# Patient Record
Sex: FEMALE | Race: WHITE | NOT HISPANIC OR LATINO | Employment: OTHER
[De-identification: names, ages, dates, MRNs, and addresses within clinical notes are randomized per-mention and may not be internally consistent; named-entity substitution may affect disease eponyms.]

---

## 2017-08-19 ENCOUNTER — HEALTH MAINTENANCE LETTER (OUTPATIENT)
Age: 37
End: 2017-08-19

## 2019-11-06 ENCOUNTER — HEALTH MAINTENANCE LETTER (OUTPATIENT)
Age: 39
End: 2019-11-06

## 2020-02-16 ENCOUNTER — HEALTH MAINTENANCE LETTER (OUTPATIENT)
Age: 40
End: 2020-02-16

## 2020-11-29 ENCOUNTER — HEALTH MAINTENANCE LETTER (OUTPATIENT)
Age: 40
End: 2020-11-29

## 2021-04-10 ENCOUNTER — HEALTH MAINTENANCE LETTER (OUTPATIENT)
Age: 41
End: 2021-04-10

## 2021-09-19 ENCOUNTER — HEALTH MAINTENANCE LETTER (OUTPATIENT)
Age: 41
End: 2021-09-19

## 2022-05-01 ENCOUNTER — HEALTH MAINTENANCE LETTER (OUTPATIENT)
Age: 42
End: 2022-05-01

## 2022-06-19 ENCOUNTER — HOSPITAL ENCOUNTER (OUTPATIENT)
Facility: CLINIC | Age: 42
Setting detail: OBSERVATION
Discharge: HOME | End: 2022-06-20
Attending: EMERGENCY MEDICINE | Admitting: INTERNAL MEDICINE
Payer: MEDICARE

## 2022-06-19 ENCOUNTER — APPOINTMENT (OUTPATIENT)
Dept: ULTRASOUND IMAGING | Facility: CLINIC | Age: 42
End: 2022-06-19
Attending: EMERGENCY MEDICINE
Payer: MEDICARE

## 2022-06-19 DIAGNOSIS — K81.9 CHOLECYSTITIS: ICD-10-CM

## 2022-06-19 LAB
ALBUMIN SERPL-MCNC: 3.7 G/DL (ref 3.4–5)
ALP SERPL-CCNC: 66 U/L (ref 40–150)
ALT SERPL W P-5'-P-CCNC: 48 U/L (ref 0–50)
ANION GAP SERPL CALCULATED.3IONS-SCNC: 4 MMOL/L (ref 3–14)
AST SERPL W P-5'-P-CCNC: 69 U/L (ref 0–45)
BASOPHILS # BLD AUTO: 0 10E3/UL (ref 0–0.2)
BASOPHILS NFR BLD AUTO: 0 %
BILIRUB SERPL-MCNC: 0.7 MG/DL (ref 0.2–1.3)
BUN SERPL-MCNC: 8 MG/DL (ref 7–30)
CALCIUM SERPL-MCNC: 9 MG/DL (ref 8.5–10.1)
CHLORIDE BLD-SCNC: 108 MMOL/L (ref 94–109)
CO2 SERPL-SCNC: 27 MMOL/L (ref 20–32)
CREAT SERPL-MCNC: 0.86 MG/DL (ref 0.52–1.04)
EOSINOPHIL # BLD AUTO: 0.1 10E3/UL (ref 0–0.7)
EOSINOPHIL NFR BLD AUTO: 0 %
ERYTHROCYTE [DISTWIDTH] IN BLOOD BY AUTOMATED COUNT: 13.9 % (ref 10–15)
GFR SERPL CREATININE-BSD FRML MDRD: 88 ML/MIN/1.73M2
GLUCOSE BLD-MCNC: 119 MG/DL (ref 70–99)
HCG SERPL QL: NEGATIVE
HCT VFR BLD AUTO: 39.7 % (ref 35–47)
HGB BLD-MCNC: 12.2 G/DL (ref 11.7–15.7)
IMM GRANULOCYTES # BLD: 0 10E3/UL
IMM GRANULOCYTES NFR BLD: 0 %
LIPASE SERPL-CCNC: 116 U/L (ref 73–393)
LYMPHOCYTES # BLD AUTO: 1.3 10E3/UL (ref 0.8–5.3)
LYMPHOCYTES NFR BLD AUTO: 12 %
MCH RBC QN AUTO: 24.7 PG (ref 26.5–33)
MCHC RBC AUTO-ENTMCNC: 30.7 G/DL (ref 31.5–36.5)
MCV RBC AUTO: 81 FL (ref 78–100)
MONOCYTES # BLD AUTO: 0.7 10E3/UL (ref 0–1.3)
MONOCYTES NFR BLD AUTO: 6 %
NEUTROPHILS # BLD AUTO: 9.1 10E3/UL (ref 1.6–8.3)
NEUTROPHILS NFR BLD AUTO: 82 %
NRBC # BLD AUTO: 0 10E3/UL
NRBC BLD AUTO-RTO: 0 /100
PLATELET # BLD AUTO: 280 10E3/UL (ref 150–450)
POTASSIUM BLD-SCNC: 3.6 MMOL/L (ref 3.4–5.3)
PROT SERPL-MCNC: 7 G/DL (ref 6.8–8.8)
RBC # BLD AUTO: 4.93 10E6/UL (ref 3.8–5.2)
SARS-COV-2 RNA RESP QL NAA+PROBE: NEGATIVE
SODIUM SERPL-SCNC: 139 MMOL/L (ref 133–144)
WBC # BLD AUTO: 11.2 10E3/UL (ref 4–11)

## 2022-06-19 PROCEDURE — 85025 COMPLETE CBC W/AUTO DIFF WBC: CPT | Performed by: EMERGENCY MEDICINE

## 2022-06-19 PROCEDURE — 99285 EMERGENCY DEPT VISIT HI MDM: CPT | Mod: 25

## 2022-06-19 PROCEDURE — 96365 THER/PROPH/DIAG IV INF INIT: CPT

## 2022-06-19 PROCEDURE — 82310 ASSAY OF CALCIUM: CPT | Performed by: EMERGENCY MEDICINE

## 2022-06-19 PROCEDURE — 250N000009 HC RX 250: Performed by: EMERGENCY MEDICINE

## 2022-06-19 PROCEDURE — 250N000011 HC RX IP 250 OP 636: Performed by: EMERGENCY MEDICINE

## 2022-06-19 PROCEDURE — 36415 COLL VENOUS BLD VENIPUNCTURE: CPT | Performed by: EMERGENCY MEDICINE

## 2022-06-19 PROCEDURE — U0003 INFECTIOUS AGENT DETECTION BY NUCLEIC ACID (DNA OR RNA); SEVERE ACUTE RESPIRATORY SYNDROME CORONAVIRUS 2 (SARS-COV-2) (CORONAVIRUS DISEASE [COVID-19]), AMPLIFIED PROBE TECHNIQUE, MAKING USE OF HIGH THROUGHPUT TECHNOLOGIES AS DESCRIBED BY CMS-2020-01-R: HCPCS | Performed by: EMERGENCY MEDICINE

## 2022-06-19 PROCEDURE — 258N000003 HC RX IP 258 OP 636: Performed by: EMERGENCY MEDICINE

## 2022-06-19 PROCEDURE — 84703 CHORIONIC GONADOTROPIN ASSAY: CPT | Performed by: EMERGENCY MEDICINE

## 2022-06-19 PROCEDURE — 76705 ECHO EXAM OF ABDOMEN: CPT

## 2022-06-19 PROCEDURE — 96375 TX/PRO/DX INJ NEW DRUG ADDON: CPT

## 2022-06-19 PROCEDURE — 83690 ASSAY OF LIPASE: CPT | Performed by: EMERGENCY MEDICINE

## 2022-06-19 PROCEDURE — 96361 HYDRATE IV INFUSION ADD-ON: CPT

## 2022-06-19 PROCEDURE — 99219 PR INITIAL OBSERVATION CARE,LEVEL II: CPT | Performed by: INTERNAL MEDICINE

## 2022-06-19 PROCEDURE — 250N000013 HC RX MED GY IP 250 OP 250 PS 637: Performed by: EMERGENCY MEDICINE

## 2022-06-19 PROCEDURE — C9803 HOPD COVID-19 SPEC COLLECT: HCPCS

## 2022-06-19 PROCEDURE — G0378 HOSPITAL OBSERVATION PER HR: HCPCS

## 2022-06-19 RX ORDER — KETOROLAC TROMETHAMINE 15 MG/ML
15 INJECTION, SOLUTION INTRAMUSCULAR; INTRAVENOUS ONCE
Status: DISCONTINUED | OUTPATIENT
Start: 2022-06-19 | End: 2022-06-20

## 2022-06-19 RX ORDER — DIPHENHYDRAMINE HCL 25 MG
25 CAPSULE ORAL ONCE
Status: COMPLETED | OUTPATIENT
Start: 2022-06-19 | End: 2022-06-19

## 2022-06-19 RX ORDER — CEFTRIAXONE 2 G/1
2 INJECTION, POWDER, FOR SOLUTION INTRAMUSCULAR; INTRAVENOUS ONCE
Status: COMPLETED | OUTPATIENT
Start: 2022-06-19 | End: 2022-06-19

## 2022-06-19 RX ORDER — LORAZEPAM 2 MG/ML
.5-1 INJECTION INTRAMUSCULAR EVERY 4 HOURS PRN
Status: DISCONTINUED | OUTPATIENT
Start: 2022-06-19 | End: 2022-06-20 | Stop reason: HOSPADM

## 2022-06-19 RX ORDER — ONDANSETRON 2 MG/ML
4 INJECTION INTRAMUSCULAR; INTRAVENOUS EVERY 30 MIN PRN
Status: DISCONTINUED | OUTPATIENT
Start: 2022-06-19 | End: 2022-06-20

## 2022-06-19 RX ORDER — SODIUM CHLORIDE 9 MG/ML
INJECTION, SOLUTION INTRAVENOUS CONTINUOUS
Status: DISCONTINUED | OUTPATIENT
Start: 2022-06-19 | End: 2022-06-20

## 2022-06-19 RX ORDER — PIPERACILLIN SODIUM, TAZOBACTAM SODIUM 3; .375 G/15ML; G/15ML
3.38 INJECTION, POWDER, LYOPHILIZED, FOR SOLUTION INTRAVENOUS ONCE
Status: DISCONTINUED | OUTPATIENT
Start: 2022-06-19 | End: 2022-06-19

## 2022-06-19 RX ADMIN — SODIUM CHLORIDE 1000 ML: 9 INJECTION, SOLUTION INTRAVENOUS at 20:11

## 2022-06-19 RX ADMIN — CEFTRIAXONE SODIUM 2 G: 2 INJECTION, POWDER, FOR SOLUTION INTRAMUSCULAR; INTRAVENOUS at 22:39

## 2022-06-19 RX ADMIN — LIDOCAINE HYDROCHLORIDE 30 ML: 20 SOLUTION ORAL; TOPICAL at 20:11

## 2022-06-19 RX ADMIN — ONDANSETRON 4 MG: 2 INJECTION INTRAMUSCULAR; INTRAVENOUS at 20:14

## 2022-06-19 RX ADMIN — DIPHENHYDRAMINE HYDROCHLORIDE 25 MG: 25 CAPSULE ORAL at 21:36

## 2022-06-19 RX ADMIN — SODIUM CHLORIDE: 9 INJECTION, SOLUTION INTRAVENOUS at 22:38

## 2022-06-19 ASSESSMENT — ENCOUNTER SYMPTOMS
VOMITING: 0
CONSTIPATION: 0
NAUSEA: 1
ABDOMINAL PAIN: 1
DIARRHEA: 0
BLOOD IN STOOL: 0

## 2022-06-20 ENCOUNTER — HOSPITAL ENCOUNTER (EMERGENCY)
Facility: CLINIC | Age: 42
Discharge: HOME | End: 2022-06-20
Payer: MEDICARE

## 2022-06-20 VITALS
BODY MASS INDEX: 29.79 KG/M2 | HEART RATE: 104 BPM | WEIGHT: 174.5 LBS | HEIGHT: 64 IN | DIASTOLIC BLOOD PRESSURE: 111 MMHG | TEMPERATURE: 98.1 F | SYSTOLIC BLOOD PRESSURE: 166 MMHG | OXYGEN SATURATION: 95 % | RESPIRATION RATE: 18 BRPM

## 2022-06-20 VITALS
HEIGHT: 64 IN | WEIGHT: 174.8 LBS | RESPIRATION RATE: 18 BRPM | TEMPERATURE: 97.5 F | OXYGEN SATURATION: 99 % | HEART RATE: 83 BPM | DIASTOLIC BLOOD PRESSURE: 114 MMHG | SYSTOLIC BLOOD PRESSURE: 164 MMHG | BODY MASS INDEX: 29.84 KG/M2

## 2022-06-20 LAB
HOLD SPECIMEN: NORMAL

## 2022-06-20 PROCEDURE — G0378 HOSPITAL OBSERVATION PER HR: HCPCS

## 2022-06-20 PROCEDURE — 250N000013 HC RX MED GY IP 250 OP 250 PS 637: Performed by: INTERNAL MEDICINE

## 2022-06-20 PROCEDURE — 96361 HYDRATE IV INFUSION ADD-ON: CPT

## 2022-06-20 RX ORDER — PROCHLORPERAZINE 25 MG
25 SUPPOSITORY, RECTAL RECTAL EVERY 12 HOURS PRN
Status: DISCONTINUED | OUTPATIENT
Start: 2022-06-20 | End: 2022-06-20 | Stop reason: HOSPADM

## 2022-06-20 RX ORDER — PROCHLORPERAZINE MALEATE 10 MG
10 TABLET ORAL EVERY 6 HOURS PRN
Status: DISCONTINUED | OUTPATIENT
Start: 2022-06-20 | End: 2022-06-20 | Stop reason: HOSPADM

## 2022-06-20 RX ORDER — NALOXONE HYDROCHLORIDE 0.4 MG/ML
0.4 INJECTION, SOLUTION INTRAMUSCULAR; INTRAVENOUS; SUBCUTANEOUS
Status: DISCONTINUED | OUTPATIENT
Start: 2022-06-20 | End: 2022-06-20 | Stop reason: HOSPADM

## 2022-06-20 RX ORDER — NALOXONE HYDROCHLORIDE 0.4 MG/ML
0.2 INJECTION, SOLUTION INTRAMUSCULAR; INTRAVENOUS; SUBCUTANEOUS
Status: DISCONTINUED | OUTPATIENT
Start: 2022-06-20 | End: 2022-06-20 | Stop reason: HOSPADM

## 2022-06-20 RX ORDER — AMOXICILLIN 250 MG
2 CAPSULE ORAL 2 TIMES DAILY PRN
Status: DISCONTINUED | OUTPATIENT
Start: 2022-06-20 | End: 2022-06-20 | Stop reason: HOSPADM

## 2022-06-20 RX ORDER — HYDROMORPHONE HCL IN WATER/PF 6 MG/30 ML
0.2 PATIENT CONTROLLED ANALGESIA SYRINGE INTRAVENOUS
Status: DISCONTINUED | OUTPATIENT
Start: 2022-06-20 | End: 2022-06-20 | Stop reason: HOSPADM

## 2022-06-20 RX ORDER — AMOXICILLIN 250 MG
1 CAPSULE ORAL 2 TIMES DAILY PRN
Status: DISCONTINUED | OUTPATIENT
Start: 2022-06-20 | End: 2022-06-20 | Stop reason: HOSPADM

## 2022-06-20 RX ORDER — ONDANSETRON 2 MG/ML
4 INJECTION INTRAMUSCULAR; INTRAVENOUS EVERY 6 HOURS PRN
Status: DISCONTINUED | OUTPATIENT
Start: 2022-06-20 | End: 2022-06-20 | Stop reason: HOSPADM

## 2022-06-20 RX ORDER — ACETAMINOPHEN 650 MG/1
650 SUPPOSITORY RECTAL EVERY 6 HOURS PRN
Status: DISCONTINUED | OUTPATIENT
Start: 2022-06-20 | End: 2022-06-20 | Stop reason: HOSPADM

## 2022-06-20 RX ORDER — NICOTINE 21 MG/24HR
1 PATCH, TRANSDERMAL 24 HOURS TRANSDERMAL AT BEDTIME
Status: DISCONTINUED | OUTPATIENT
Start: 2022-06-20 | End: 2022-06-20 | Stop reason: HOSPADM

## 2022-06-20 RX ORDER — CEFTRIAXONE 2 G/1
2 INJECTION, POWDER, FOR SOLUTION INTRAMUSCULAR; INTRAVENOUS EVERY 24 HOURS
Status: DISCONTINUED | OUTPATIENT
Start: 2022-06-20 | End: 2022-06-20 | Stop reason: HOSPADM

## 2022-06-20 RX ORDER — ONDANSETRON 4 MG/1
4 TABLET, ORALLY DISINTEGRATING ORAL EVERY 6 HOURS PRN
Status: DISCONTINUED | OUTPATIENT
Start: 2022-06-20 | End: 2022-06-20 | Stop reason: HOSPADM

## 2022-06-20 RX ORDER — OXYCODONE HYDROCHLORIDE 5 MG/1
5 TABLET ORAL EVERY 4 HOURS PRN
Status: DISCONTINUED | OUTPATIENT
Start: 2022-06-20 | End: 2022-06-20 | Stop reason: HOSPADM

## 2022-06-20 RX ORDER — ACETAMINOPHEN 325 MG/1
650 TABLET ORAL EVERY 6 HOURS PRN
Status: DISCONTINUED | OUTPATIENT
Start: 2022-06-20 | End: 2022-06-20 | Stop reason: HOSPADM

## 2022-06-20 RX ORDER — LIDOCAINE 40 MG/G
CREAM TOPICAL
Status: DISCONTINUED | OUTPATIENT
Start: 2022-06-20 | End: 2022-06-20 | Stop reason: HOSPADM

## 2022-06-20 RX ADMIN — NICOTINE 1 PATCH: 14 PATCH, EXTENDED RELEASE TRANSDERMAL at 00:28

## 2022-06-20 NOTE — ED TRIAGE NOTES
Pt discharged from [facility] this am. Was told she would have to have gall bladder surgery. Having upper abdominal pain 7/10 with nausea. Received zofran PO from EMS with some relief.      Triage Assessment     Row Name 06/20/22 1415       Triage Assessment (Adult)    Airway WDL WDL       Respiratory WDL    Respiratory WDL WDL       Skin Circulation/Temperature WDL    Skin Circulation/Temperature WDL WDL       Cardiac WDL    Cardiac WDL WDL       Peripheral/Neurovascular WDL    Peripheral Neurovascular WDL WDL       Cognitive/Neuro/Behavioral WDL    Cognitive/Neuro/Behavioral WDL WDL

## 2022-06-20 NOTE — PHARMACY-ADMISSION MEDICATION HISTORY
Pharmacy Medication History  Admission medication history interview status for the 6/19/2022  admission is complete. See EPIC admission navigator for prior to admission medications     Location of Interview: Patient room  Medication history sources: Patient, Surescripts and Care Everywhere    Significant changes made to the medication list:  None     In the past week, patient estimated taking medication this percent of the time: greater than 90%    Additional medication history information:   None     Medication reconciliation completed by provider prior to medication history? No    Time spent in this activity: 10 minutes    Prior to Admission medications    Medication Sig Last Dose Taking? Auth Provider Long Term End Date   clonazePAM (KLONOPIN) 0.5 MG tablet Take 0.5-1 mg by mouth daily as needed for anxiety 6/18/2022 at 2 tabs at bedtime Yes Reported, Patient Yes        The information provided in this note is only as accurate as the sources available at the time of update(s)

## 2022-06-20 NOTE — PROGRESS NOTES
Progress note:     Pt decided to leave AMA. She declined offers to talk to the provider regarding her treatment plan. PIV removed. Pt was given all her belongings.

## 2022-06-20 NOTE — PLAN OF CARE
RECEIVING UNIT ED HANDOFF REVIEW    ED Nurse Handoff Report was reviewed by: Lino Maldonado RN on June 19, 2022 at 11:22 PM

## 2022-06-20 NOTE — ED PROVIDER NOTES
History   Chief Complaint:  Abdominal pain     HPI   Bria Breen is a 41 year old female with history of bulimia nervosa who presents with abdominal pain. The patient states that this morning she suddenly she developed upper abdominal pain. She states that besides some nausea she does not have vomiting, black or bloody stool, diarrhea. She denies any stomach ulcers or irritation. She denies alcohol abuse or excessive ibuprofen use. She denies any surgery on her abdomen.    Review of Systems   Gastrointestinal: Positive for abdominal pain and nausea. Negative for blood in stool, constipation, diarrhea and vomiting.   All other systems reviewed and are negative.    Allergies:  Bee  Benadryl Allergy  Vistaril    Medications:  ARIPiprazole  ClonazePAM   QUEtiapine Fumarate  SUMAtriptan   venlafaxine     Past Medical History:     Hyperlipidemia  nicotine dependence  Schizoaffective disorder  OCD  Varicella  Migraines  Bulimia nervosa  Depression     Past Surgical History:    Septoplasty  Removal of blood clot  Nose surgery    Family History:    Father-heart attack, high blood pressure,high cholesterol, HTN  Mother-Depression, HTN, high cholesterol    Social History:  The patient presents to the ED via EMS.    Physical Exam     Patient Vitals for the past 24 hrs:   BP Temp Temp src Pulse Resp   06/19/22 1947 (!) 151/97 99.1  F (37.3  C) Oral 72 20       Physical Exam  VS: Reviewed per above  HENT: normal speech  EYES: sclera anicteric  CV: Rate as noted, regular rhythm.   RESP: Effort normal. Breath sounds are normal bilaterally.  GI: moderate upper abdominal tenderness without rebound/guarding, not distended.  NEURO: Alert, moving all extremities  MSK: No deformity of the extremities  SKIN: Warm and dry    Emergency Department Course     Imaging:  Abdomen US, limited (RUQ only)   Final Result   IMPRESSION:   1.  Findings suspicious for acute cholecystitis.   2.  Hepatomegaly with possible steatosis.               Report per radiology    Laboratory:  Labs Ordered and Resulted from Time of ED Arrival to Time of ED Departure   COMPREHENSIVE METABOLIC PANEL - Abnormal       Result Value    Sodium 139      Potassium 3.6      Chloride 108      Carbon Dioxide (CO2) 27      Anion Gap 4      Urea Nitrogen 8      Creatinine 0.86      Calcium 9.0      Glucose 119 (*)     Alkaline Phosphatase 66      AST 69 (*)     ALT 48      Protein Total 7.0      Albumin 3.7      Bilirubin Total 0.7      GFR Estimate 88     CBC WITH PLATELETS AND DIFFERENTIAL - Abnormal    WBC Count 11.2 (*)     RBC Count 4.93      Hemoglobin 12.2      Hematocrit 39.7      MCV 81      MCH 24.7 (*)     MCHC 30.7 (*)     RDW 13.9      Platelet Count 280      % Neutrophils 82      % Lymphocytes 12      % Monocytes 6      % Eosinophils 0      % Basophils 0      % Immature Granulocytes 0      NRBCs per 100 WBC 0      Absolute Neutrophils 9.1 (*)     Absolute Lymphocytes 1.3      Absolute Monocytes 0.7      Absolute Eosinophils 0.1      Absolute Basophils 0.0      Absolute Immature Granulocytes 0.0      Absolute NRBCs 0.0     LIPASE - Normal    Lipase 116     HCG QUALITATIVE PREGNANCY - Normal    hCG Serum Qualitative Negative     COVID-19 VIRUS (CORONAVIRUS) BY PCR      Emergency Department Course:         Reviewed:  I reviewed nursing notes, vitals, past medical history and Care Everywhere    Assessments:  1944 I obtained history and examined the patient as noted above.   2205 I rechecked the patient and explained findings.     Consults:  2205 Talked to Dr Muir about patients symptoms and admittance.    Interventions:  2011 Bolus 1000mL IV  2011 Xylocaine 30mL PO  2014 Zofran 4mg IV  2136 Toradol 15mg IV  2136 Benadryl 25mg    Disposition:  The patient was admitted to the hospital under the care of Dr. Muir.     Impression & Plan     Medical Decision Making:  Patient presents to the ER for evaluation of acute onset upper abdominal pain.  On arrival vital signs are  reassuring.  She does have moderate upper abdominal tenderness without peritoneal signs.  Ultrasound does show signs of cholecystitis.  Labs otherwise reassuring without signs of obstructive LFT pattern.  IV antibiotics provided.  She was admitted for further surgical consultation.    Diagnosis:    ICD-10-CM    1. Cholecystitis  K81.9        Discharge Medications:  New Prescriptions    No medications on file       Scribe Disclosure:  Gary RIBERA, am serving as a scribe at 7:44 PM on 6/19/2022 to document services personally performed by Rupesh Mishra MD based on my observations and the provider's statements to me.              Rupesh Mishra MD  06/19/22 0962

## 2022-06-20 NOTE — ED NOTES
Bed: ED24  Expected date:   Expected time:   Means of arrival:   Comments:  Shalini 1 41F abdominal pain eta 7

## 2022-06-20 NOTE — PROVIDER NOTIFICATION
MD Notification    Notified Person: MD    Notified Person Name:  Tinaerickson     Notification Date/Time: 06/20/22 @ 05:22    Notification Interaction: pager     Purpose of Notification: pt is hypertensive, BP: 166/111 and pulse: 104. Pt is asymptomatic and resting in bed.     Orders Received:    Comments:

## 2022-06-20 NOTE — PLAN OF CARE
Observation goals  PRIOR TO DISCHARGE        Comments:     -diagnostic tests and consults completed and resulted: not met      -vital signs normal or at patient baseline: not met, slightly hypertensive     -infection is improving: partially met       Nurse to notify provider when observation goals have been met and patient is ready for discharge.
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
## 2022-06-20 NOTE — ED NOTES
[facility]  ED Nurse Handoff Report    ED Chief complaint: Abdominal Pain      ED Diagnosis:   Final diagnoses:   Cholecystitis       Code Status: Full Code    Allergies:   Allergies   Allergen Reactions     Bee      Vistaril      Panic attack       Patient Story: Pt had sudden onset sharp diffuse abd pain this AM. C/o NV, LMP 3w wks ago. Requesting the morning after pill.             Triage Assessment            Row Name 06/19/22 1950             Triage Assessment (Adult)      Airway WDL WDL             Respiratory WDL      Respiratory WDL WDL             Skin Circulation/Temperature WDL      Skin Circulation/Temperature WDL WDL             Cardiac WDL      Cardiac WDL WDL             Peripheral/Neurovascular WDL      Peripheral Neurovascular WDL WDL             Cognitive/Neuro/Behavioral WDL      Cognitive/Neuro/Behavioral WDL WDL           Focused Assessment:  Pt c/o abd pain, nausea,improved after meds,     Treatments and/or interventions provided: see chart  Patient's response to treatments and/or interventions: tolerated well    To be done/followed up on inpatient unit:  see chart    Does this patient have any cognitive concerns?: na    Activity level - Baseline/Home:  Independent  Activity Level - Current:   Independent    Patient's Preferred language: English   Needed?: No    Isolation: None  Infection: Not Applicable  Patient tested for COVID 19 prior to admission: YES  Bariatric?: No    Vital Signs:   Vitals:    06/19/22 1947   BP: (!) 151/97   Pulse: 72   Resp: 20   Temp: 99.1  F (37.3  C)   TempSrc: Oral       Cardiac Rhythm:     Was the PSS-3 completed:   Yes  What interventions are required if any?  See chart             Family Comments: na  OBS brochure/video discussed/provided to patient/family: Yes              Name of person given brochure if not patient: na              Relationship to patient: na    For the majority of the shift this patient's behavior was Green.    Behavioral interventions performed were na.    ED NURSE PHONE NUMBER: [phone]

## 2022-06-20 NOTE — ED TRIAGE NOTES
Pt had sudden onset sharp diffuse abd pain this AM. C/o NV, LMP 3w wks ago. Requesting the morning after pill.     Triage Assessment     Row Name 06/19/22 1950       Triage Assessment (Adult)    Airway WDL WDL       Respiratory WDL    Respiratory WDL WDL       Skin Circulation/Temperature WDL    Skin Circulation/Temperature WDL WDL       Cardiac WDL    Cardiac WDL WDL       Peripheral/Neurovascular WDL    Peripheral Neurovascular WDL WDL       Cognitive/Neuro/Behavioral WDL    Cognitive/Neuro/Behavioral WDL WDL

## 2022-06-20 NOTE — PLAN OF CARE
Orientation/Cognitive: A/O x4   Observation Goals (Met/ Not Met): not met   Mobility Level/Assist Equipment: SBA   Fall Risk (Y/N): no   Behavior Concerns: flat affect, refusing cares   Pain Management: declines interventions, prns available   Tele/VS/O2: VSS on RA, ex HTN   ABNL Lab/BG: WBC: 11.2   Diet: NPO   Bowel/Bladder: Continent   Skin Concerns: no   Drains/Devices: PIV SL   Tests/Procedures for next shift: Gen surg consult   Anticipated DC date & active delays: pending   Patient Stated Goal for Today: Go home       Observation goals  PRIOR TO DISCHARGE        Comments:      -diagnostic tests and consults completed and resulted: not met       -vital signs normal or at patient baseline: not met, slightly hypertensive      -infection is improving: partially met        Nurse to notify provider when observation goals have been met and patient is ready for discharge.

## 2022-06-28 NOTE — DISCHARGE SUMMARY
[facility]  Hospitalist Discharge Summary      Date of Admission:  6/19/2022  Date of Discharge:  6/20/2022  5:43 AM  Discharging Provider: Hiren Muir MD  Discharge Service: Hospitalist Service    Discharge Diagnoses   Symptomatic cholelithiasis with early acute cholecystitis  Generalized anxiety disorder with panic  Schizoaffective disorder  Major depression with history of suicidal gesture  OCD  History of Bulmia Nervosa  Migraine headaches  Tobacco use disorder    Follow-ups Needed After Discharge   Surgery follow up for cholecystectomy; left AMA from hospital prior to being seen by surgical team      Discharge Disposition   Left against medical advice prior to being seen by provider/surgical team date of discharge     Hospital Course   Admitted with nausea, vomiting, right upper quadrant and epigastric pain.  Initially treated with 2 g ceftriaxone with a general surgery consult placed after gallbladder wall thickening was noted on right upper quadrant ultrasound.  Discussed plan for surgical intervention with patient on admission in the setting of cholecystitis, and at that time, she was agreeable.    Early this morning, however, patient opted to leave AGAINST MEDICAL ADVICE prior to being seen by provider or surgical team.    Consultations This Hospital Stay   SURGERY GENERAL IP CONSULT    Code Status   Prior    Time Spent on this Encounter   I, Hiren Muir MD, discharged this patient today but I did not personally see the patient today and will not be billing for the patient's discharge.       Hiren Muir MD  [facility] EXTENDED RECOVERY AND SHORT STAY  [address] [place] [id number]  Phone: [phone]  ______________________________________________________________________    Physical Exam   Physical exam was not performed date of discharge as patient left AGAINST MEDICAL ADVICE prior to being seen       Primary Care Physician    Radha Zeng    Discharge Orders   No discharge procedures on file.    Significant Results and Procedures   Results for orders placed or performed during the hospital encounter of 06/19/22   Abdomen US, limited (RUQ only)    Narrative    EXAM: US ABDOMEN LIMITED  LOCATION: [facility] [place] [facility]  DATE/TIME: 6/19/2022 8:42 PM    INDICATION: upper abd pain x1 day  COMPARISON: None.  TECHNIQUE: Limited abdominal ultrasound.    FINDINGS:    GALLBLADDER: Cholelithiasis, several of which are nonmobile in the gallbladder neck. There is gallbladder wall edema and positive sonographic Sullivan's sign. No definite pericholecystic fluid.    BILE DUCTS: No biliary dilatation. The common duct measures 4 mm.    LIVER: Hepatomegaly with possible steatosis. No focal mass.    RIGHT KIDNEY: No hydronephrosis.    PANCREAS: The visualized portions are normal.    No ascites.      Impression    IMPRESSION:  1.  Findings suspicious for acute cholecystitis.  2.  Hepatomegaly with possible steatosis.             Discharge Medications   Discharge Medication List as of 6/20/2022  5:43 AM      CONTINUE these medications which have NOT CHANGED    Details   clonazePAM (KLONOPIN) 0.5 MG tablet Take 0.5-1 mg by mouth daily as needed for anxiety, Historical           Allergies   Allergies   Allergen Reactions     Bee      Vistaril      Panic attack

## 2022-07-30 ENCOUNTER — HOSPITAL ENCOUNTER (EMERGENCY)
Facility: CLINIC | Age: 42
Discharge: HOME | End: 2022-07-30
Attending: EMERGENCY MEDICINE | Admitting: EMERGENCY MEDICINE
Payer: MEDICARE

## 2022-07-30 VITALS
TEMPERATURE: 98 F | OXYGEN SATURATION: 97 % | HEART RATE: 99 BPM | SYSTOLIC BLOOD PRESSURE: 155 MMHG | RESPIRATION RATE: 18 BRPM | DIASTOLIC BLOOD PRESSURE: 95 MMHG

## 2022-07-30 DIAGNOSIS — R46.89 INAPPROPRIATE SOCIAL BEHAVIOR: ICD-10-CM

## 2022-07-30 PROCEDURE — 99283 EMERGENCY DEPT VISIT LOW MDM: CPT

## 2022-07-30 ASSESSMENT — ENCOUNTER SYMPTOMS
HEADACHES: 1
ABDOMINAL PAIN: 0

## 2022-07-30 NOTE — ED TRIAGE NOTES
Pt picked up by PD and placed on emergency hold, pt found walking on the street with no shirt, pants or shoes. States she is being evicted and has no place to go. Pt is A&O x4, VSS, denies pain but appears unable to take care of self at this time.     Triage Assessment     Row Name 07/30/22 1211       Triage Assessment (Adult)    Airway WDL WDL       Respiratory WDL    Respiratory WDL WDL       Skin Circulation/Temperature WDL    Skin Circulation/Temperature WDL WDL       Cardiac WDL    Cardiac WDL WDL       Peripheral/Neurovascular WDL    Peripheral Neurovascular WDL WDL       Cognitive/Neuro/Behavioral WDL    Cognitive/Neuro/Behavioral WDL WDL

## 2022-07-30 NOTE — ED PROVIDER NOTES
History   Chief Complaint:  Altered Mental Status       The history is provided by the patient and the EMS personnel.      Bria Breen is a 42 year old female with history of schizoaffective disorder, anxiety and depression who presents with altered mental status. The patient reports that she got up this morning and went to the gas station to get cigarettes. EMS reports that the police had found the patient wearing only a bra and underwear, with a cardigan over top. The patient endorses a mild headache. She denies abdominal pain, drug use and alcohol. The patient says the only reason she was inappropriately dressed because it was hot outside, and did not find her outfit to be unreasonable. Patient says she does not know what her living situation will be starting tomorrow.    Review of Systems   Gastrointestinal: Negative for abdominal pain.   Neurological: Positive for headaches.   Psychiatric/Behavioral: Positive for behavioral problems.   All other systems reviewed and are negative.    Allergies:  Bee  Vistaril    Medications:  Prozac  Vistaril  Desyrel  Abilify  Klonopin  Seroquel  Ativan  Deltasone    Past Medical History:     Schizoaffective disorder  Cholecystitis  Anxiety  Bulimia nervosa  Depression  Plantar wart  Migraine  Varicella  OCD  Nicotine dependence  Hyperlipidemia  UTI  PTSD  Gallstones    Past Surgical History:    Blood clot removal  Septoplasty    Family History:    Mother: Hypertension, migraines, gallbladder disease, hyperlipidemia  Father: Hypertension, hyperlipidemia, CAD    Social History:  Patient arrived to ED via EMS.    Physical Exam     Patient Vitals for the past 24 hrs:   BP Temp Temp src Pulse Resp SpO2   07/30/22 1208 -- 98  F (36.7  C) Temporal -- 18 --   07/30/22 1207 -- -- -- -- -- 97 %   07/30/22 1206 (!) 155/95 -- -- 99 -- --       Physical Exam  Eye:  Pupils are equal, round, and reactive.  Extraocular movements intact.    ENT:  No rhinorrhea.  Moist mucus membranes.   "Normal tongue and tonsil.    Cardiac:  Regular rate and rhythm.  No murmurs, gallops, or rubs.    Pulmonary:  Clear to auscultation bilaterally.  No wheezes, rales, or rhonchi.    Abdomen:  Positive bowel sounds.  Abdomen is soft and non-distended, without focal tenderness.    Musculoskeletal:  Normal movement of all extremities without evidence for deficit.    Skin:  Warm and dry without rashes.    Neurologic:  Non-focal exam without asymmetric weakness or numbness.     Psychiatric:  Normal affect with appropriate interaction with examiner. Reasonable eye contact. Able to recite today's events. She seems socially inappropriate regarding her outfit today, but does not appear to be altered.    Emergency Department Course     Emergency Department Course:    Reviewed:  I reviewed nursing notes, vitals, past medical history and Care Everywhere    Assessments:  1129 I obtained history and examined the patient as noted above.     Disposition:  The patient was discharged to home.     Impression & Plan     Medical Decision Making:  This 42-year-old woman with a history of schizoaffective disorder presents to us with inappropriate social behavior today.  Police were called when she was found walking down the street with a very short dress on her bodies.  However, it had been unbuttoned in the front and she was exposing her underwear.  She was also not wearing a bra.  Bystanders were concerned that she appeared to be disheveled and called for assistance.  Police brought her here because they also felt that she was exhibiting inappropriate behavior and thought that she may be under the influence of drugs or showing inability to care for herself.    On my conversation with the patient, she is generally clear.  She is able to tell me what happened this morning.  She tells me that she woke up and wanted to get a \"rockstar energy drink and some cigarettes.\"  She grabbed the first thing that was next to her bed, this stress that " "she had worn last night.  She was on her way to get these items when she was stopped by police.  She states that she was not exactly clear whether or not the dress was open, though she noted that it was warm outside and his dress was a fairly heavy material and she was feeling warm.  She seems very nonchalant about this outfit and encounter, stating \"most people are wearing far through her clothes then I am right now, I do not know what the big deal is.\"    The patient's head to toe exam is otherwise unremarkable.  She is alert and oriented x4.  She has no complaints of chest, abdominal, or infectious symptoms.  She is not suicidal or homicidal.  She desires to be discharged.  I see no indication to place her on a hold at this time.  I see no indication for blood work or urinalysis.  She denies drug use, and while there may be drugs in her system, I do not believe that it is germane to the current situation and she appears to be clinically sober.  She was given bus tokens and will be discharged at her request.  She was invited back to our facility at any point if he changes her mind about being reassessed or if there are other emergent concerns.      Diagnosis:    ICD-10-CM    1. Inappropriate social behavior  R46.89        Discharge Medications:  New Prescriptions    No medications on file       Scribe Disclosure:  I, Sybil Fredonia, am serving as a scribe at 12:04 PM on 7/30/2022 to document services personally performed by Trierweiler, Chad A, MD based on my observations and the provider's statements to me.            Trierweiler, Chad A, MD  07/31/22 1019    "

## 2022-07-30 NOTE — DISCHARGE INSTRUCTIONS
Discharge Instructions  Mental Health Concerns    You were seen today for mental health concerns, such as depression, anxiety, or suicidal thinking. Your provider feels that you do not require hospitalization at this time. However, your symptoms may become worse, and you may need to return to the Emergency Department. Most treatments of depression and suicidal thoughts are a process rather than a single intervention.  Medications and counseling can take several weeks or more to help.    Generally, every Emergency Department visit should have a follow-up clinic visit with either a primary or a specialty clinic/provider. Please follow-up as instructed by your emergency provider today.    By accepting these discharge instructions:  You promise to not harm yourself or others.  You agree that if you feel you are becoming unable to keep that promise, you will do something to help yourself before you do anything to harm yourself or others.   You agree to keep any safety plan arranged on your visit here today.  You agree to take any medication prescribed or recommended by your provider.  If you are getting worse, you can contact a friend or a family member, contact your counselor or family provider, contact a crisis line, or other options discussed with the provider or therapist today.  At any time, you can call 911 and return to the Emergency Department for more help.  You understand that follow-up is essential to your treatment, and you will make and keep appointments recommended on your visit today.    How to improve your mental health and prevent suicide:  Involve others by letting family, friends, counselors know.  Do not isolate yourself.  Avoid alcohol or drugs. Remove weapons, poisons from your home.  Try to stick to routines for eating, sleeping and getting regular exercise.    Try to get into sunlight. Bright natural light not only treats seasonal affective disorder but also depression.  Increase safe activities  that you enjoy.    If you feel worse, contact 1-800-suicide (1-130.860.5015), or call 911, or your primary provider/counselor for additional assistance.    If you were given a prescription for medicine here today, be sure to read all of the information (including the package insert) that comes with your prescription.  This will include important information about the medicine, its side effects, and any warnings that you need to know about.  The pharmacist who fills the prescription can provide more information and answer questions you may have about the medicine.  If you have questions or concerns that the pharmacist cannot address, please call or return to the Emergency Department.   Remember that you can always come back to the Emergency Department if you are not able to see your regular provider in the amount of time listed above, if you get any new symptoms, or if there is anything that worries you.

## 2022-07-30 NOTE — ED NOTES
Bed: ED19  Expected date:   Expected time:   Means of arrival:   Comments:  [facility] 43 F mental health ETA 1154

## 2022-08-12 ENCOUNTER — HOSPITAL ENCOUNTER (EMERGENCY)
Facility: CLINIC | Age: 42
Discharge: LEFT BEFORE DISCHARGE | End: 2022-08-12
Payer: MEDICARE

## 2022-08-13 NOTE — ED TRIAGE NOTES
Report from ambulance:  Pt BIBA with complaints of R ear pain. Pt reports pain started 2 hours ago. Recently homeless. Pt reports stabbing pain. Worse in the R ear than the L. Denies any injuries.
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
## 2022-11-21 ENCOUNTER — HEALTH MAINTENANCE LETTER (OUTPATIENT)
Age: 42
End: 2022-11-21

## 2023-01-24 ENCOUNTER — HOSPITAL ENCOUNTER (EMERGENCY)
Facility: CLINIC | Age: 43
Discharge: HOME | End: 2023-01-25
Attending: PSYCHIATRY & NEUROLOGY | Admitting: PSYCHIATRY & NEUROLOGY
Payer: MEDICARE

## 2023-01-24 DIAGNOSIS — Z59.00 HOMELESS: ICD-10-CM

## 2023-01-24 DIAGNOSIS — F29 PSYCHOSIS, UNSPECIFIED PSYCHOSIS TYPE (H): ICD-10-CM

## 2023-01-24 DIAGNOSIS — Z86.59 HISTORY OF SCHIZOAFFECTIVE DISORDER: ICD-10-CM

## 2023-01-24 LAB
AMPHETAMINES UR QL SCN: ABNORMAL
BARBITURATES UR QL: ABNORMAL
BENZODIAZ UR QL: ABNORMAL
CANNABINOIDS UR QL SCN: ABNORMAL
COCAINE UR QL: ABNORMAL
OPIATES UR QL SCN: ABNORMAL

## 2023-01-24 PROCEDURE — 250N000013 HC RX MED GY IP 250 OP 250 PS 637: Performed by: PSYCHIATRY & NEUROLOGY

## 2023-01-24 PROCEDURE — 90791 PSYCH DIAGNOSTIC EVALUATION: CPT

## 2023-01-24 PROCEDURE — 99284 EMERGENCY DEPT VISIT MOD MDM: CPT | Performed by: PSYCHIATRY & NEUROLOGY

## 2023-01-24 PROCEDURE — 80307 DRUG TEST PRSMV CHEM ANLYZR: CPT | Performed by: PSYCHIATRY & NEUROLOGY

## 2023-01-24 PROCEDURE — 99285 EMERGENCY DEPT VISIT HI MDM: CPT | Mod: 25 | Performed by: PSYCHIATRY & NEUROLOGY

## 2023-01-24 RX ORDER — DIPHENHYDRAMINE HCL 50 MG
50 CAPSULE ORAL 2 TIMES DAILY PRN
COMMUNITY
Start: 2022-11-07 | End: 2023-09-22

## 2023-01-24 RX ORDER — OLANZAPINE 20 MG/1
10 TABLET ORAL 2 TIMES DAILY
COMMUNITY
Start: 2022-11-07 | End: 2023-07-06

## 2023-01-24 RX ORDER — OLANZAPINE 10 MG/1
10 TABLET, ORALLY DISINTEGRATING ORAL 2 TIMES DAILY
Status: DISCONTINUED | OUTPATIENT
Start: 2023-01-24 | End: 2023-01-25 | Stop reason: HOSPADM

## 2023-01-24 RX ORDER — NICOTINE 21 MG/24HR
1 PATCH, TRANSDERMAL 24 HOURS TRANSDERMAL DAILY
Status: DISCONTINUED | OUTPATIENT
Start: 2023-01-24 | End: 2023-01-25 | Stop reason: HOSPADM

## 2023-01-24 RX ORDER — DEXTROAMPHETAMINE SACCHARATE, AMPHETAMINE ASPARTATE, DEXTROAMPHETAMINE SULFATE AND AMPHETAMINE SULFATE 2.5; 2.5; 2.5; 2.5 MG/1; MG/1; MG/1; MG/1
2 TABLET ORAL DAILY
COMMUNITY
Start: 2023-01-23 | End: 2023-09-14

## 2023-01-24 RX ADMIN — NICOTINE 1 PATCH: 21 PATCH, EXTENDED RELEASE TRANSDERMAL at 19:36

## 2023-01-24 RX ADMIN — OLANZAPINE 10 MG: 10 TABLET, ORALLY DISINTEGRATING ORAL at 19:36

## 2023-01-24 SDOH — ECONOMIC STABILITY - HOUSING INSECURITY: HOMELESSNESS UNSPECIFIED: Z59.00

## 2023-01-24 ASSESSMENT — COLUMBIA-SUICIDE SEVERITY RATING SCALE - C-SSRS
6. HAVE YOU EVER DONE ANYTHING, STARTED TO DO ANYTHING, OR PREPARED TO DO ANYTHING TO END YOUR LIFE?: NO
1. IN THE PAST MONTH, HAVE YOU WISHED YOU WERE DEAD OR WISHED YOU COULD GO TO SLEEP AND NOT WAKE UP?: NO
4. HAVE YOU HAD THESE THOUGHTS AND HAD SOME INTENTION OF ACTING ON THEM?: YES
3. HAVE YOU BEEN THINKING ABOUT HOW YOU MIGHT KILL YOURSELF?: YES
REASONS FOR IDEATION PAST MONTH: DOES NOT APPLY
5. HAVE YOU STARTED TO WORK OUT OR WORKED OUT THE DETAILS OF HOW TO KILL YOURSELF? DO YOU INTEND TO CARRY OUT THIS PLAN?: NO
4. HAVE YOU HAD THESE THOUGHTS AND HAD SOME INTENTION OF ACTING ON THEM?: NO
2. HAVE YOU ACTUALLY HAD ANY THOUGHTS OF KILLING YOURSELF?: YES
ATTEMPT LIFETIME: NO
TOTAL  NUMBER OF ABORTED OR SELF INTERRUPTED ATTEMPTS LIFETIME: NO
2. HAVE YOU ACTUALLY HAD ANY THOUGHTS OF KILLING YOURSELF?: NO
1. HAVE YOU WISHED YOU WERE DEAD OR WISHED YOU COULD GO TO SLEEP AND NOT WAKE UP?: YES
TOTAL  NUMBER OF INTERRUPTED ATTEMPTS LIFETIME: NO
REASONS FOR IDEATION LIFETIME: EQUALLY TO GET ATTENTION, REVENGE, OR A REACTION FROM OTHERS AND TO END/STOP THE PAIN

## 2023-01-24 ASSESSMENT — ACTIVITIES OF DAILY LIVING (ADL)
ADLS_ACUITY_SCORE: 35

## 2023-01-25 ENCOUNTER — TELEPHONE (OUTPATIENT)
Dept: BEHAVIORAL HEALTH | Facility: CLINIC | Age: 43
End: 2023-01-25
Payer: MEDICARE

## 2023-01-25 VITALS
DIASTOLIC BLOOD PRESSURE: 89 MMHG | SYSTOLIC BLOOD PRESSURE: 132 MMHG | TEMPERATURE: 98 F | OXYGEN SATURATION: 100 % | HEART RATE: 94 BPM | RESPIRATION RATE: 18 BRPM

## 2023-01-25 PROCEDURE — 250N000013 HC RX MED GY IP 250 OP 250 PS 637: Performed by: PSYCHIATRY & NEUROLOGY

## 2023-01-25 RX ORDER — OLANZAPINE 10 MG/1
10 TABLET ORAL 2 TIMES DAILY
Qty: 30 TABLET | Refills: 0 | Status: SHIPPED | OUTPATIENT
Start: 2023-01-25 | End: 2023-09-22

## 2023-01-25 RX ADMIN — NICOTINE 1 PATCH: 21 PATCH, EXTENDED RELEASE TRANSDERMAL at 09:01

## 2023-01-25 RX ADMIN — OLANZAPINE 10 MG: 10 TABLET, ORALLY DISINTEGRATING ORAL at 09:01

## 2023-01-25 ASSESSMENT — ACTIVITIES OF DAILY LIVING (ADL)
ADLS_ACUITY_SCORE: 35

## 2023-01-25 ASSESSMENT — COLUMBIA-SUICIDE SEVERITY RATING SCALE - C-SSRS
ATTEMPT SINCE LAST CONTACT: NO
TOTAL  NUMBER OF ABORTED OR SELF INTERRUPTED ATTEMPTS SINCE LAST CONTACT: NO
TOTAL  NUMBER OF INTERRUPTED ATTEMPTS SINCE LAST CONTACT: NO
1. SINCE LAST CONTACT, HAVE YOU WISHED YOU WERE DEAD OR WISHED YOU COULD GO TO SLEEP AND NOT WAKE UP?: NO
SUICIDE, SINCE LAST CONTACT: NO
2. HAVE YOU ACTUALLY HAD ANY THOUGHTS OF KILLING YOURSELF?: NO
6. HAVE YOU EVER DONE ANYTHING, STARTED TO DO ANYTHING, OR PREPARED TO DO ANYTHING TO END YOUR LIFE?: NO

## 2023-01-25 NOTE — TELEPHONE ENCOUNTER
"This writer made an attempt to call the patient to schedule therapy. Messages states phone number has restrictions and call can't be made. Will make a 2nd attempt tomorrow. Message sent through [employer].     Jaimee Pierceler  1/25/23  11:38am    ----- Message from Sweta Turcios sent at 1/25/2023 11:10 AM CST -----  Regarding: Therapy referral  [facility] Referral   [place]         Please Check Type of Referral Requested:       XX THERAPY: The Transition clinic is able to schedule patients without current medical insurance; these patient will be referred to our Social Work Care Coordinator for Medical Insurance              Assistance. We are open for referral for psychotherapy. Patient is referred from:  [facility]      ____MEDICATION:  Referrals for Medication are ONLY accepted from the following areas (select): Emergency Department/Urgent Care - HIGH PRIORITY                                       Suboxone and Opioid Management Referrals are automatically denied. [facility] cannot see patient without active medical insurance.       GUARDIAN: If your patient is not their own Guardian, please provide the following:    Guardian Name:  Guardian Contact Information (Phone & Email) :  Guardian Address:     FOSTER CARE PROVIDER: If your patient lives at a Licensed Foster Care, please provide the following:    Foster Provider:  Foster Provider Contact Information (Phone & Email):  Foster Provider Address:         Referring Provider Contact Name: Sweta LOPEZ/Willow KATZ; Phone Number: [phone]    Reason for Transition Clinic Referral: \"\"Pt is currently homeless. He is unemployed and reports that he lost his Social Security benefits because his wife earned too much money. He has been staying with his father since 01/20/2023. Pt has been traveling around the country since  from his wife in August 2022. He was in [place] in October 2022 and was in [place] a second time before " "returning to [place] on 01/19/23. When pt landed in [place] on 01/19/23, he was arrested on an outstanding warrant and spent a night in [facility]. Per pt's mother, pt has outstanding warrants in [place] and has court hearings scheduled in [facility] in March 2023. \"    Next Level of Care Patient Will Be Transitioned To: N/A. Pt to look for established provider. Will be discharging to shelter 1/25.     What Would Be Helpful from the Transition Clinic: see above     Needs: NO    Does Patient Have Access to Technology: Yes    Patient E-mail Address: [email or web address]    Current Patient Phone Number: [phone];     Clinician Gender Preference (if applicable): NO    Patient location preference: [place]                   "

## 2023-01-25 NOTE — CONSULTS
Diagnostic Evaluation Consultation  Crisis Assessment    Patient was assessed: In Person  Patient location: [facility] ED  Was a release of information signed: No. Reason: no DEEDEE needed at this time      Referral Data and Chief Complaint  Cam is a 42 year old, who uses he/him pronouns, and presents to the ED alone. Patient is referred to the ED by self. Patient is presenting to the ED for the following concerns: worsening auditory hallucinations that are command in nature.      Informed Consent and Assessment Methods     Patient is his own guardian. Writer met with patient and explained the crisis assessment process, including applicable information disclosures and limits to confidentiality, assessed understanding of the process, and obtained consent to proceed with the assessment. Patient was observed to be able to participate in the assessment as evidenced by verbal understanding and engagement in the assessment process. Assessment methods included conducting a formal interview with patient, review of medical records, collaboration with medical staff, and obtaining relevant collateral information from family and community providers when available..     Over the course of this crisis assessment this writer provided reassurance, offered validation and engaged patient in problem solving and disposition planning. Patient's response to interventions was positive. Pt answered this writer's questions about his mental health and engaged with this writer in disposition planning.     Summary of Patient Situation    Pt presents to the ED with worsening auditory hallucinations that are command in nature. The hallucinations tell him to steal and to jump off of a bridge. Pt denies tactile or visual hallucinations. Pt states that the hallucinations have become overwhelming and that he wants them to stop. The ongoing nature of the hallucinations are making him feel depressed. Pt has gone to residential for stealing and is worried about  going back to [facility] for stealing if the voices continue. Pt denies suicidal ideation and does not want to listen to the voices that are telling him to kill himself. Pt denies NSSI.     Pt is currently sleeping about 7 to 8 hours per night. Pt states that he likes to sleep because he does not experience voices then. He reports a slightly suppressed appetite. Pt reports a history of cocaine and methamphetamine use and states he last used a couple of months ago. Pt did test positive for amphetamines in the ED but he is prescribed Adderall.     Brief Psychosocial History     Pt is currently homeless. He is unemployed and reports that he lost his Social Security benefits because his wife earned too much money. He has been staying with his father since 01/20/2023. Pt has been traveling around the country since  from his wife in August 2022. He was in [place] in October 2022 and was in [place] a second time before returning to [place] on 01/19/23. When pt landed in [place] on 01/19/23, he was arrested on an outstanding warrant and spent a night in [facility]. Per pt's mother, pt has outstanding warrants in [place] and has court hearings scheduled in [facility] in March 2023.     Pt's parents are both supports for him. Per family report, he has significant conflict with his sister, who believes that he should be in a homeless shelter instead of staying with his parents.    Per mother's report, pt graduated from the [employer]. Mother indicates that pt has had two marriages and his first marriage ended in divorce.    Significant Clinical History     Pt carries current diagnoses of schizoaffective disorder (depressive type), generalized anxiety disorder, obsessive compulsive disorder, borderline personality disorder, and body dysmorphia. He has a history of anorexia and bulimia. Pt reports that he is currently prescribed Zyprexa 10 mg and Adderall 20 mg and occasionally takes  "Seroquel. His last admission discharge note only indicates Benadryl 50 mg for extrapyramidal symptoms and Zyprexa 10 mg twice daily. He is followed by a psychiatrist.    Pt has a history of psychiatric hospitalizations. His most recent hospitalizations were at [facility] from 10/10-10/14/22 and 11/02-11/07/22 at [facility] for symptoms of psychosis. [place] petitioned pt for commitment due to psychotic symptoms and medication non-adherence. [facility] declined pt for commitment following review. Pt has a history of suicidal ideation but denies a history of suicide attempts and no suicide attempts are noted in his chart.    The following information is noted in pt's discharge notes from [facility]: \"Bria Breen is a 42 year old female with past psychiatric history significant for schizoaffective disorder and borderline personality disorder. Patient was brought to the hospital by mother Kia for disorganized and dangerous behaviors in the community. Per collateral from mother, patient has been homeless since splitting up with partner in 8/2022, and since that time has been increasingly disorganized, eating food from dumpsters, screaming at strangers, and traveling to various states before asking her mother to purchase airline tickets so she can return to [place]. Most recently she traveled to [place], where she was briefly hospitalized and discharged to a treatment facility owned by [facility]. She refused to stay there and demanded to be brought to [facility] airport. Her mother purchased a last minute flight for her to come home.\"     Collateral Information    The following information was received from Kia Castro whose relationship to the patient is mother. Information was obtained via phone. Her phone number is [phone] and she last had contact with patient a couple of days ago.    What happened today: Mother is unsure what led to pt's presentation at the ED today.    What is different about patient's " "functioning: Pt's mental health began decompensating when he  from his wife in August 2022. He has been traveling around the country, is homeless, and is stealing. He has active warrants in [place] and court hearings in [facility] in March. Pt sometimes \"sounds off the wall\".     Concern about alcohol/drug use: Yes Pt will sometimes take too much of psychiatric medication that is intended to improve his mood.    What do you think the patient needs: Pt needs access to housing and to stabilize his mental health.    Has patient made comments about wanting to kill themselves/others:  No    If d/c is recommended, can they take part in safety/aftercare planning: Yes, mother is a support for pt.    Other information: Mother e-mailed this writer a document outlining pt's mental health history and communication between pt and a high school friend. Relevant information from those documents have been incorporated into this assessment and then deleted from this writer's inbox.    The following information was received from Negro Breen whose relationship to the patient is father. Information was obtained via phone. His phone number is [phone] and he last had contact with patient on 01/24/2023.    What happened today: Around 10 or 11 AM, pt walked downstairs in winter clothes, said that he was going to the hospital, and took an Uber there.    What is different about patient's functioning: Pt arrived from [place] on 01/19/2023, was arrested on an outstanding warrant, was released from skilled nursing on 01/20/23, and has been staying with his father since. Pt started to decline late on Sunday and yesterday. Father could tell that pt was becoming more psychotic as pt's face \"looked different\".     Concern about alcohol/drug use: No    What do you think the patient needs: Pt needs hospitalization for his mental health.    Has patient made comments about wanting to kill themselves/others:  No. Father notes that pt " does have arguments with his sister over the phone because his sister wants him to go to a homeless shelter instead of staying with his parents.    If d/c is recommended, can they take part in safety/aftercare planning: Yes, father is a support for pt.    Other information: Pt also stayed with father in December 2022. At that time, father observed pt talking to himself and gesticulating into the air.    Risk Assessment  [place] Suicide Severity Rating Scale Full Clinical Version: 01/24/23  Suicidal Ideation  1. Wish to be Dead (Lifetime): Yes  1. Wish to be Dead (Past 1 Month): No  2. Non-Specific Active Suicidal Thoughts (Lifetime): Yes  2. Non-Specific Active Suicidal Thoughts (Past 1 Month): No  3. Active Suicidal Ideation with any Methods (Not Plan) Without Intent to Act (Lifetime): Yes  3. Active Suicidal Ideation with any Methods (Not Plan) Without Intent to Act (Past 1 Month): No  4. Active Suicidal Ideation with Some Intent to Act, Without Specific Plan (Lifetime): Yes  4. Active Suicidal Ideation with Some Intent to Act, Without Specific Plan (Past 1 Month): No  5. Active Suicidal Ideation with Specific Plan and Intent (Lifetime): No  Intensity of Ideation  Most Severe Ideation Rating (Lifetime): 4  Most Severe Ideation Rating (Past 1 Month):  (0)  Frequency (Lifetime): Many times each day  Frequency (Past 1 Month):  (0)  Duration (Lifetime): More than 8 hours/persistent or continuous  Controllability (Lifetime): Can control thoughts with a lot of difficulty  Controllability (Past 1 Month):  (0)  Deterrents (Lifetime): Deterrents most likely did not stop you  Deterrents (Past 1 Month): Does not apply  Reasons for Ideation (Lifetime): Equally to get attention, revenge, or a reaction from others and to end/stop the pain  Reasons for Ideation (Past 1 Month): Does not apply  Suicidal Behavior  Actual Attempt (Lifetime): No  Has subject engaged in non-suicidal self-injurious behavior? (Lifetime):  "No  Interrupted Attempts (Lifetime): No  Aborted or Self-Interrupted Attempt (Lifetime): No  Preparatory Acts or Behavior (Lifetime): No  C-SSRS Risk (Lifetime/Recent)  Calculated C-SSRS Risk Score (Lifetime/Recent): Moderate Risk      Validity of evaluation is  impacted by presenting factors during interview: psychosis.   Comments regarding subjective versus objective responses to [place] tool: none  Environmental or Psychosocial Events: legal issues such as DWI, DUI, lawsuit, CPS involvement, etc., loss of relationship due to divorce/separation, work or task failure, challenging interpersonal relationships, unemployment/underemployment, unstable housing, homelessness and neither working nor attending school  Chronic Risk Factors: history of psychiatric hospitalization, LGBTQ+ orientation  and personality disorders   Warning Signs: acting reckless or engaging in risky activities and recent discharges from emergency department or inpatient psychiatric care  Protective Factors: able to access care without barriers and help seeking  Interpretation of Risk Scoring, Risk Mitigation Interventions and Safety Plan:  Pt reports command hallucinations telling him to jump off of a bridge. Pt reports that he does not have suicidal ideation and does not want to follow through with what the voices are telling him to do.     Does the patient have thoughts of harming others? No     Is the patient engaging in sexually inappropriate behavior? No, however; [facility] discharge paperwork from November 2022 indicated that pt was \"sexually inappropriate\" with some of his peers on the unit and this seemed to be attention-seeking in nature.    Current Substance Abuse     Is there recent substance abuse? Pt denies recent substance use but reports a history of cocaine and methamphetamine use with last use a couple of months ago. Pt's UTOX was positive for amphetamines but pt indicates that he is prescribed Adderall.     Was a urine drug screen " or blood alcohol level obtained: Yes positive for amphetamines       Mental Status Exam     Affect: Dramatic   Appearance: Disheveled    Attention Span/Concentration: Attentive  Eye Contact: Engaged   Fund of Knowledge: Appropriate    Language /Speech Content: Fluent   Language /Speech Volume: Normal    Language /Speech Rate/Productions: Pressured    Recent Memory: Intact   Remote Memory: Intact   Mood: Anxious    Orientation to Person: Yes    Orientation to Place: Yes   Orientation to Time of Day: Yes    Orientation to Date: Yes    Situation (Do they understand why they are here?): Yes    Psychomotor Behavior: Other: restless, walking around room    Thought Content: Hallucinations and Paranoia   Thought Form: Intact      History of commitment: Yes commitment in [facility] in 2011     Medication    Psychotropic medications: Pt reports that he is prescribed Zyprexa 10 mg and Adderall 20 mg and that he is consistently taking his medication. [facility] discharge paperwork from November 2022 indicates that pt is prescribed Zyprexa 20 mg twice daily and Benadryl 50 mg for extrapyramidal symptoms.  Medication changes made in the last two weeks: No       Current Care Team    Primary Care Provider: No  Psychiatrist: Dr. Leydi Painting, unknown when last visit occurred  Therapist: No  : No     CTSS or ARMHS: No  ACT Team: No  Other: No      Diagnosis    295.70  (F25.1) Schizoaffective Disorder Depressive Type  300.02 (F41.1) Generalized Anxiety Disorder  300.3 (F42) Obsessive Compulsive Disorder  302.85 (F64.1) Gender dysphoria in Adolescents and Adults  F60.3 Borderline Personality Disorder    Clinical Summary and Substantiation of Recommendations    A lower level of care has been unsuccessful in treating and stabilizing patient s mental health symptoms. Patient will remain in the ED under observation for continued monitoring, treatment and therapeutic intervention of mental health symptoms. Observation in the ED  could help mitigate the need for a more restrictive level of care in an inpatient setting.    It is unclear at this point in time if pt's psychosis is substance-induced. Pt will remain in the ED overnight to see if his symptoms clear. The severity of pt's psychosis should be reassessed in the morning to determine next steps for pt's care.  Disposition    Recommended disposition: Other: observation       Reviewed case and recommendations with attending provider. Attending Name: Dr. Fam       Attending concurs with disposition: Yes       Patient concurs with disposition: Yes       Guardian concurs with disposition: NA      Final disposition: Other: observation.     Assessment Details    Patient interview started at: 6:30 PM and completed at: 7:00 PM.     Total duration spent on the patient case in minutes: 1.50 hrs      CPT code(s) utilized: 54120 - Psychotherapy for Crisis - 60 (30-74*) min and 89014 - Psychotherapy for Crisis (Each additional 30 minutes) - 30 min        LAURA Calvillo, Psychotherapist  [facility] - Triage & Transition Services  Callback: [phone]

## 2023-01-25 NOTE — DISCHARGE INSTRUCTIONS
"Monday 2/6/2023 8:00am- Evaluation to be complete by Katiana Mcneal. This appointment will last approximately 90 minutes. A link will be sent to your email the day of. [facility]: [phone]    A referral for Therapy has been made to the Transition Clinic. The clinic will reach out to schedule with you directly. Transition Clinic: [phone]    A reservation has been made for you on 1/25/2023 at the [facility]. Check in is 7:30pm. Address: [facility] [address] [place]. [facility]: [phone]    Aftercare Plan  If I am feeling unsafe or I am in a crisis, I will:   Contact my established care providers   Call the National Suicide Prevention Lifeline: 988  Go to the nearest emergency room   Call 481     Warning signs that I or other people might notice when a crisis is developing for me: Hearing voices, suicidal ideation, stealing things, argumentative, pacing, yelling.     Things I am able to do on my own to cope or help me feel better:  Write, read, listen to music, talk to people.    Changes I can make to support my mental health and wellness: Attend scheduled appointments.     People in my life that I can ask for help:  My dad.     Your county has a mental health crisis team you can call 24/7: [facility] Mobile Crisis  [phone]     Crisis Lines  Crisis Text Line  Text 552681  You will be connected with a trained live crisis counselor to provide support.    Por espanol, texto  MATILDE a 047841 o texto a 442-AYUDAME en WhatsApp    [employer] (LGBTQ Youth Crisis Line)  1.451.788.2399  text START to 337-531      Community Resources  Fast Tracker  Linking people to mental health and substance use disorder resources  fasttrackermn.org     [place] Mental Health Warm Line  Peer to peer support  Monday thru Saturday, 12 pm to 10 pm  846.682.4574 or 9.714.127.4284  Text \"Support\" to 91457    National [place] on Mental Illness (MEGHAN)  525.445.9403 or " 1.888.MEGHAN.HELPS      Mental Health Apps  My3  https://myPASSUR Aerospacepp.org/    VirtualHopeBox  https://SEJENT/apps/virtual-hope-box/      Additional Information  Today you were seen by a licensed mental health professional through Triage and Transition services, [facility] ([facility])  for a crisis assessment in the Emergency Department at [facility].  It is recommended that you follow up with your established providers (psychiatrist, mental health therapist, and/or primary care doctor - as relevant) as soon as possible. Coordinators from [facility] will be calling you in the next 24-48 hours to ensure that you have the resources you need.  You can also contact [facility] coordinators directly at [phone]. You may have been scheduled for or offered an appointment with a mental health provider. [facility] maintains an extensive network of licensed behavioral health providers to connect patients with the services they need.  We do not charge providers a fee to participate in our referral network.  We match patients with providers based on a patient's specific needs, insurance coverage, and location.  Our first effort will be to refer you to a provider within your care system, and will utilize providers outside your care system as needed.       Resume taking Zyprexa twice daily.    Please return to the emergency department if you have return of suicidal thoughts, homicidal thoughts, any hearing voices or seeing things.  Please keep your appointments as noted above.

## 2023-01-25 NOTE — ED NOTES
"[facility] Crisis Reassessment      Bria Breen was reassessed at the request of DEC for the following reasons: was placed in OBS for overnight and reassess in AM. [facility]   by [facility]; see the initial assessment note for details.      Patient Presentation    Initial ED presentation details: \"Pt presents to the ED with worsening auditory hallucinations that are command in nature. The hallucinations tell him to steal and to jump off of a bridge. Pt denies tactile or visual hallucinations. Pt states that the hallucinations have become overwhelming and that he wants them to stop. The ongoing nature of the hallucinations are making him feel depressed. Pt has gone to correction for stealing and is worried about going back to correction for stealing if the voices continue. Pt denies suicidal ideation and does not want to listen to the voices that are telling him to kill himself. Pt denies NSSI.      Pt is currently sleeping about 7 to 8 hours per night. Pt states that he likes to sleep because he does not experience voices then. He reports a slightly suppressed appetite. Pt reports a history of cocaine and methamphetamine use and states he last used a couple of months ago. Pt did test positive for amphetamines in the ED but he is prescribed Adderall.\"    Current patient presentation: Writer met with pt, who was agreeable. Pt woke abruptly when writer entered room and said pt's name. Pt was easily engaged. Pt reported he was having AH, \"Voices telling me to break the law or hurt myself, blah blah blah\". Pt reported he has been experiencing voices for 12 yrs. Writer asked why pt was presenting to the ED yesterday and pt said, \"I'm just tired of being homeless\". Pt denied any recent substance use and stated he last used 2 months ago. Pt reported he is not interested in [facility] treatment because he does not think he has a problem with substances. Pt reported, \"I only use recreationally\". Pt reiterated needing " help with establishing oupt provider for meds, therapy, and housing. Pt denied SI.     Changes observed since initial assessment: Pt reports being able to determine the difference between AH and self. Pt denying thoughts of suicide. Pt reporting he does not need inpt mental health. Pt only requesting help getting established with outpt services. Pt engaged and appropriate.       Risk of Harm    [place]ale Suicide Severity Rating Scale Full Clinical Version:  Suicidal Ideation  1. Wish to be Dead (Lifetime): Yes  1. Wish to be Dead (Past 1 Month): No  2. Non-Specific Active Suicidal Thoughts (Lifetime): Yes  2. Non-Specific Active Suicidal Thoughts (Past 1 Month): No  3. Active Suicidal Ideation with any Methods (Not Plan) Without Intent to Act (Lifetime): Yes  3. Active Suicidal Ideation with any Methods (Not Plan) Without Intent to Act (Past 1 Month): No  4. Active Suicidal Ideation with Some Intent to Act, Without Specific Plan (Lifetime): Yes  4. Active Suicidal Ideation with Some Intent to Act, Without Specific Plan (Past 1 Month): No  5. Active Suicidal Ideation with Specific Plan and Intent (Lifetime): No  Intensity of Ideation  Most Severe Ideation Rating (Lifetime): 4  Most Severe Ideation Rating (Past 1 Month):  (0)  Frequency (Lifetime): Many times each day  Frequency (Past 1 Month):  (0)  Duration (Lifetime): More than 8 hours/persistent or continuous  Controllability (Lifetime): Can control thoughts with a lot of difficulty  Controllability (Past 1 Month):  (0)  Deterrents (Lifetime): Deterrents most likely did not stop you  Deterrents (Past 1 Month): Does not apply  Reasons for Ideation (Lifetime): Equally to get attention, revenge, or a reaction from others and to end/stop the pain  Reasons for Ideation (Past 1 Month): Does not apply  Suicidal Behavior  Actual Attempt (Lifetime): No  Has subject engaged in non-suicidal self-injurious behavior? (Lifetime): No  Interrupted Attempts (Lifetime): No  Aborted  or Self-Interrupted Attempt (Lifetime): No  Preparatory Acts or Behavior (Lifetime): No  C-SSRS Risk (Lifetime/Recent)  Calculated C-SSRS Risk Score (Lifetime/Recent): Moderate Risk    [place]mont Suicide Severity Rating Scale Since Last Contact:   Suicidal Ideation (Since Last Contact)  1. Wish to be Dead (Since Last Contact): No  2. Non-Specific Active Suicidal Thoughts (Since Last Contact): No  Suicidal Behavior (Since Last Contact)  Actual Attempt (Since Last Contact): No  Has subject engaged in non-suicidal self-injurious behavior? (Since Last Contact): No  Interrupted Attempts (Since Last Contact): No  Aborted or Self-Interrupted Attempt (Since Last Contact): No  Preparatory Acts or Behavior (Since Last Contact): No  Suicide (Since Last Contact): No     C-SSRS Risk (Since Last Contact)  Calculated C-SSRS Risk Score (Since Last Contact): No Risk Indicated      Validity of evaluation is not impacted by presenting factors during interview: psychosis.   Comments regarding subjective versus objective responses to Belmont tool: none  Environmental or Psychosocial Events: legal issues such as DWI, DUI, lawsuit, CPS involvement, etc., loss of relationship due to divorce/separation, work or task failure, challenging interpersonal relationships, unemployment/underemployment, unstable housing, homelessness and neither working nor attending school  Chronic Risk Factors: history of psychiatric hospitalization, LGBTQ+ orientation  and personality disorders   Warning Signs: acting reckless or engaging in risky activities and recent discharges from emergency department or inpatient psychiatric care  Protective Factors: able to access care without barriers and help seeking  Interpretation of Risk Scoring, Risk Mitigation Interventions and Safety Plan:  Pt does not appear to be experiencing symptoms pf psychosis that are interfering with his ability to function in an oupt setting. Pt is reporting he can differentiate between his  AH and his sense of self. Pt is reporting he can be safe in the community and is willing to attend recommended scheduled appointments. Pt is able to engage in safety planning stating they will writer, read, listen to music, or call dad if he is feeling unsafe.       Does the patient have thoughts of harming others? No    Mental Status Exam   Affect: Appropriate   Appearance: Disheveled    Attention Span/Concentration: Attentive?    Eye Contact: Variable   Fund of Knowledge: Appropriate    Language /Speech Content: Fluent   Language /Speech Volume: Normal    Language /Speech Rate/Productions: Normal    Recent Memory: Intact   Remote Memory: Intact   Mood: Irritable    Orientation to Person: Yes    Orientation to Place: Yes   Orientation to Time of Day: Yes    Orientation to Date: Yes    Situation (Do they understand why they are here?): Yes    Psychomotor Behavior: Normal    Thought Content: Clear   Thought Form: Intact       Additional Collateral Information   Writer LM for pt's momKia [phone] at the request of pt to share pt is being discharged.       Therapeutic Intervention  The following therapeutic methodologies were employed when working with the patient: Establishing rapport, Active listening, Establish a discharge plan and Safety planning. Patient response to intervention: engaged.    Diagnosis:   295.70  (F25.1) Schizoaffective Disorder Depressive Type  300.02 (F41.1) Generalized Anxiety Disorder  300.3 (F42) Obsessive Compulsive Disorder  302.85 (F64.1) Gender dysphoria in Adolescents and Adults  F60.3 Borderline Personality Disorder    Clinical Substantiation of Recommendations  Pt does not appear to be experiencing symptoms pf psychosis that are interfering with his ability to function in an oupt setting. Pt is reporting he can differentiate between his AH and his sense of self. Pt is reporting he can be safe in the community and is willing to attend recommended scheduled appointments. Pt is  able to engage in safety planning stating they will writer, read, listen to music, or call dad if he is feeling unsafe.     Plan:    Disposition  Recommended disposition: Individual Therapy, Medication Management and Other: Shelter and Diagnostic Assessment       Reviewed case and recommendations with attending provider. Attending Name:     Attending concurs with disposition: Yes      Patient concurs with disposition: Yes      Final disposition: Individual therapy , Medication management and Other: Shelter and Diagnostic Assessment .         Assessment Details  Total duration spent on the patient case in minutes: .50 hrs     CPT code(s) utilized: 79327 - Psychotherapy (with patient) - 30 (16-37*) min       Willow Arango, LMFT, [facility]  Callback: [phone]      Aftercare Plan  If I am feeling unsafe or I am in a crisis, I will:   Contact my established care providers   Call the National Suicide Prevention Lifeline: 988  Go to the nearest emergency room   Call 911      Warning signs that I or other people might notice when a crisis is developing for me: Hearing voices, suicidal ideation, stealing things, argumentative, pacing, yelling.      Things I am able to do on my own to cope or help me feel better:  Write, read, listen to music, talk to people.     Changes I can make to support my mental health and wellness: Attend scheduled appointments.      People in my life that I can ask for help:  My dad.      Your county has a mental health crisis team you can call 24/7: [facility] Mobile Crisis  [phone]      Crisis Lines  Crisis Text Line  Text 005434  You will be connected with a trained live crisis counselor to provide support.     Por espanol, texto  MATILDE a 619512 o texto a 442-AYUDAME en WhatsApp     The [employer] (LGBTQ Youth Crisis Line)  2.732.604.9619  text START to 753-687        Community Resources  Fast Tracker  Linking people to mental health and substance use disorder resources   "QPSoftwareckermn.SpongeFish      [place] Mental Health Warm Line  Peer to peer support  Monday thru Saturday, 12 pm to 10 pm  471.937.9454 or 7.637.466.5507  Text \"Support\" to 05465     National [place] on Mental Illness (MEGHAN)  433.601.1014 or 1.888.MEGHAN.HELPS        Mental Health Apps  My3  https://Flashstarts.org/     VirtualHopeBox  https://"[employer],"/apps/virtual-hope-box/        Additional Information  Today you were seen by a licensed mental health professional through Triage and Transition services, [facility] ([facility])  for a crisis assessment in the Emergency Department at [facility].  It is recommended that you follow up with your established providers (psychiatrist, mental health therapist, and/or primary care doctor - as relevant) as soon as possible. Coordinators from [facility] will be calling you in the next 24-48 hours to ensure that you have the resources you need.  You can also contact [facility] coordinators directly at [phone]. You may have been scheduled for or offered an appointment with a mental health provider. [facility] maintains an extensive network of licensed behavioral health providers to connect patients with the services they need.  We do not charge providers a fee to participate in our referral network.  We match patients with providers based on a patient's specific needs, insurance coverage, and location.  Our first effort will be to refer you to a provider within your care system, and will utilize providers outside your care system as needed.       "
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
## 2023-01-25 NOTE — PLAN OF CARE
Bria Breen  January 24, 2023  Plan of Care Hand-off Note     Patient Care Path: Observation    Plan for Care:     A lower level of care has been unsuccessful in treating and stabilizing patient s mental health symptoms. Patient will remain in the ED under observation for continued monitoring, treatment and therapeutic intervention of mental health symptoms. Observation in the ED could help mitigate the need for a more restrictive level of care in an inpatient setting.      It is unclear at this point in time if pt's psychosis is substance-induced. Pt will remain in the ED overnight to see if his symptoms clear. The severity of pt's psychosis should be reassessed in the morning to determine next steps for pt's care.    Critical Safety Issues: worsening auditory hallucinations that are command in nature    Overview:  This patient is a child/adolescent: No    This patient has additional special visitor precautions: No    Legal Status: Voluntary    Contacts:   Kia Castro, mother, PH: [phone]  Negro Breen, father, PH: [phone]    Psychiatry Consult:  Psychiatry Consult not requested because pt is here voluntarily.    Updated RN and Attending Provider regarding plan of care.    Queenie Mitchell, LAURA

## 2023-01-25 NOTE — ED PROVIDER NOTES
ED Observation Discharge Summary  [facility]  Discharge Date: 1/25/2023    Bria Breen MRN: [id number]   Age: 42 year old YOB: 1980     Brief HPI & Initial ED Course     Chief Complaint   Patient presents with     Mental Health Problem     Pt reports AH for multiple years, and states that they have now become unmanageable. Pt endorsing SI, with plan to jump off a bridge but no intent. Pt denies drug or alcohol use.      Suicidal     HPI  Bria Breen is a 42 year old female with PMH notable for schizoaffective disorder, OCD who presented to the ED with a psychiatric concern.  Patient was reporting ongoing auditory hallucinations that he had been having for years.  Also reported suicidal ideation.      The patient was evaluated in the emergency department by a physician and DEC behavioral health . The DEC  recommended observation overnight after giving the patient his Zyprexa. See separate DEC note from this encounter for details on the assessment. The patient's psychiatric state was such that she would benefit from ongoing monitoring. Observation care was initiated with the plan including serial assessments of psychiatric condition, potential administration of medications if indicated, and further disposition pending the patient's psychiatric course during the monitoring period.     See ED Observation H&P for further details on the patient's presenting history and initial evaluation.     Physical Exam   BP: (!) 143/103  Pulse: (!) 139  Temp: 98.4  F (36.9  C)  Resp: 16  SpO2: 98 %    Physical Exam  General: . Appears stated age.   HENT: MMM, no oropharyngeal lesions  Eyes: PERRL, normal sclerae   Cardio: Regular rate, extremities well perfused  Resp: Normal work of breathing, normal respiratory rate  Neuro: alert and fully oriented. CN II-XII grossly intact. Grossly normal strength and sensation in all extremities.   MSK: no deformities.    Integumentary/Skin: no rash visualized, normal color  Psych: normal affect and behavior. Denies SI. no HI. Denies ongoing hallucinations.      Results      Procedures            Labs Ordered and Resulted from Time of ED Arrival to Time of ED Departure   DRUG ABUSE SCREEN 1 URINE (ED) - Abnormal       Result Value    Amphetamines Urine Screen Positive (*)     Barbiturates Urine Screen Negative      Benzodiazepines Urine Screen Negative      Cannabinoids Urine Screen Negative      Cocaine Urine Screen Negative      Opiates Urine Screen Negative              Observation Course   The patient was found to have a psychiatric condition that would benefit from an observation stay in the emergency department for further psychiatric stabilization and/or coordination of a safe disposition. The plan upon observation admission included serial assessments of psychiatric condition, potential administration of medications if indicated, further disposition pending the patient's psychiatric course during the monitoring period.     Serial assessments of the patient's psychiatric condition were performed. Nursing notes were reviewed. During the observation period, the patient did not require medications for agitation, and did not require restraints/seclusion for patient and/or provider safety.        After the period in observation care, the patient's circumstances and mental state were safe for outpatient management. After counseling on the diagnosis, work-up, and treatment plan, the patient was discharged. Close follow-up with a psychiatrist and therapist was recommended and appointments were made for him as well as arrangement for him to go to [facility]. community psychiatric resources were provided. Patient is to return to the ED if any urgent or potentially life-threatening concerns.      At the time of reevaluation, patient denies any auditory or visual hallucinations, denies any suicidal homicidal  ideation.  He states that he had been taking Zyprexa at 1 point, however, no longer has his Zyprexa.  We will give a refill until he follows up with his diagnostic assessment on February 6.    Discharge Diagnoses:   Final diagnoses:   Psychosis, unspecified psychosis type (H)   History of schizoaffective disorder   Homeless       --  Caity Heart MD  [facility] EMERGENCY DEPARTMENT  1/25/2023      Caity Heart MD  01/25/23 1232

## 2023-01-26 ENCOUNTER — TELEPHONE (OUTPATIENT)
Dept: BEHAVIORAL HEALTH | Facility: CLINIC | Age: 43
End: 2023-01-26
Payer: MEDICARE

## 2023-01-26 NOTE — TELEPHONE ENCOUNTER
"Second attempt to contact pt. Writer unable to leave . Writer sent email and my chart message to contact patient. Coordinator will natacha referral as complete.Tracker completed.    Savannah Correarera  01/26/2023  941    ----- Message from Sweta Turcios sent at 1/25/2023 11:10 AM CST -----  Regarding: Therapy referral  [facility] Referral   [place]         Please Check Type of Referral Requested:       XX THERAPY: The Transition clinic is able to schedule patients without current medical insurance; these patient will be referred to our Social Work Care Coordinator for Medical Insurance              Assistance. We are open for referral for psychotherapy. Patient is referred from:  [facility]      ____MEDICATION:  Referrals for Medication are ONLY accepted from the following areas (select): Emergency Department/Urgent Care - HIGH PRIORITY                                       Suboxone and Opioid Management Referrals are automatically denied. TC Psychiatry cannot see patient without active medical insurance.       GUARDIAN: If your patient is not their own Guardian, please provide the following:    Guardian Name:  Guardian Contact Information (Phone & Email) :  Guardian Address:     FOSTER CARE PROVIDER: If your patient lives at a Licensed Foster Care, please provide the following:    Foster Provider:  Foster Provider Contact Information (Phone & Email):  Foster Provider Address:         Referring Provider Contact Name: Sweta KATZ; Phone Number: [phone]    Reason for Transition Clinic Referral: \"\"Pt is currently homeless. He is unemployed and reports that he lost his Social Security benefits because his wife earned too much money. He has been staying with his father since 01/20/2023. Pt has been traveling around the country since  from his wife in August 2022. He was in [place] in October 2022 and was in [place] a second time before returning to [place] on " "01/19/23. When pt landed in [place] on 01/19/23, he was arrested on an outstanding warrant and spent a night in nursing home. Per pt's mother, pt has outstanding warrants in [place] and has court hearings scheduled in [facility] in March 2023. \"    Next Level of Care Patient Will Be Transitioned To: N/A. Pt to look for established provider. Will be discharging to shelter 1/25.     What Would Be Helpful from the Transition Clinic: see above     Needs: NO    Does Patient Have Access to Technology: Yes    Patient E-mail Address: [email or web address]    Current Patient Phone Number: [phone];     Clinician Gender Preference (if applicable): NO    Patient location preference: [place]                   "

## 2023-02-01 ENCOUNTER — TELEPHONE (OUTPATIENT)
Dept: BEHAVIORAL HEALTH | Facility: CLINIC | Age: 43
End: 2023-02-01

## 2023-02-06 ENCOUNTER — TELEPHONE (OUTPATIENT)
Dept: BEHAVIORAL HEALTH | Facility: CLINIC | Age: 43
End: 2023-02-06

## 2023-02-06 NOTE — TELEPHONE ENCOUNTER
Patient have a video appointment today at 8am with [facility]. Writer placed a call this morning to check in patient. Unable to get a hold of patient. Patient phone line is can not be completed. Unable to reach patient. Unable to leave a vm. Writer informed patient's .

## 2023-06-16 ENCOUNTER — HOSPITAL ENCOUNTER (EMERGENCY)
Facility: CLINIC | Age: 43
Discharge: HOME | End: 2023-06-16
Attending: EMERGENCY MEDICINE | Admitting: EMERGENCY MEDICINE
Payer: COMMERCIAL

## 2023-06-16 VITALS
DIASTOLIC BLOOD PRESSURE: 89 MMHG | TEMPERATURE: 99.7 F | BODY MASS INDEX: 30.21 KG/M2 | OXYGEN SATURATION: 98 % | HEART RATE: 98 BPM | RESPIRATION RATE: 16 BRPM | WEIGHT: 160 LBS | SYSTOLIC BLOOD PRESSURE: 140 MMHG | HEIGHT: 61 IN

## 2023-06-16 DIAGNOSIS — F41.9 ANXIETY: ICD-10-CM

## 2023-06-16 LAB
AMPHETAMINES UR QL SCN: NORMAL
BARBITURATES UR QL SCN: NORMAL
BENZODIAZ UR QL SCN: NORMAL
BZE UR QL SCN: NORMAL
CANNABINOIDS UR QL SCN: NORMAL
HCG UR QL: NEGATIVE
OPIATES UR QL SCN: NORMAL

## 2023-06-16 PROCEDURE — 80307 DRUG TEST PRSMV CHEM ANLYZR: CPT | Performed by: EMERGENCY MEDICINE

## 2023-06-16 PROCEDURE — 250N000013 HC RX MED GY IP 250 OP 250 PS 637: Performed by: EMERGENCY MEDICINE

## 2023-06-16 PROCEDURE — 81025 URINE PREGNANCY TEST: CPT | Performed by: EMERGENCY MEDICINE

## 2023-06-16 PROCEDURE — 99284 EMERGENCY DEPT VISIT MOD MDM: CPT | Performed by: EMERGENCY MEDICINE

## 2023-06-16 PROCEDURE — 99285 EMERGENCY DEPT VISIT HI MDM: CPT | Mod: 25 | Performed by: EMERGENCY MEDICINE

## 2023-06-16 PROCEDURE — 90791 PSYCH DIAGNOSTIC EVALUATION: CPT

## 2023-06-16 RX ORDER — LORAZEPAM 0.5 MG/1
TABLET ORAL
COMMUNITY
Start: 2022-07-12 | End: 2023-07-06

## 2023-06-16 RX ORDER — ARIPIPRAZOLE 15 MG/1
15 TABLET ORAL DAILY
COMMUNITY
End: 2023-07-06

## 2023-06-16 RX ORDER — OLANZAPINE 5 MG/1
5 TABLET, ORALLY DISINTEGRATING ORAL ONCE
Status: COMPLETED | OUTPATIENT
Start: 2023-06-16 | End: 2023-06-16

## 2023-06-16 RX ADMIN — OLANZAPINE 5 MG: 5 TABLET, ORALLY DISINTEGRATING ORAL at 05:39

## 2023-06-16 ASSESSMENT — ACTIVITIES OF DAILY LIVING (ADL)
ADLS_ACUITY_SCORE: 35

## 2023-06-16 ASSESSMENT — COLUMBIA-SUICIDE SEVERITY RATING SCALE - C-SSRS
3. HAVE YOU BEEN THINKING ABOUT HOW YOU MIGHT KILL YOURSELF?: YES
REASONS FOR IDEATION LIFETIME: EQUALLY TO GET ATTENTION, REVENGE, OR A REACTION FROM OTHERS AND TO END/STOP THE PAIN
1. HAVE YOU WISHED YOU WERE DEAD OR WISHED YOU COULD GO TO SLEEP AND NOT WAKE UP?: YES
2. HAVE YOU ACTUALLY HAD ANY THOUGHTS OF KILLING YOURSELF?: YES
REASONS FOR IDEATION PAST MONTH: EQUALLY TO GET ATTENTION, REVENGE, OR A REACTION FROM OTHERS AND TO END/STOP THE PAIN
5. HAVE YOU STARTED TO WORK OUT OR WORKED OUT THE DETAILS OF HOW TO KILL YOURSELF? DO YOU INTEND TO CARRY OUT THIS PLAN?: NO
1. IN THE PAST MONTH, HAVE YOU WISHED YOU WERE DEAD OR WISHED YOU COULD GO TO SLEEP AND NOT WAKE UP?: NO
6. HAVE YOU EVER DONE ANYTHING, STARTED TO DO ANYTHING, OR PREPARED TO DO ANYTHING TO END YOUR LIFE?: NO
2. HAVE YOU ACTUALLY HAD ANY THOUGHTS OF KILLING YOURSELF?: NO
TOTAL  NUMBER OF INTERRUPTED ATTEMPTS LIFETIME: NO
ATTEMPT LIFETIME: NO
4. HAVE YOU HAD THESE THOUGHTS AND HAD SOME INTENTION OF ACTING ON THEM?: NO
TOTAL  NUMBER OF ABORTED OR SELF INTERRUPTED ATTEMPTS LIFETIME: NO

## 2023-06-16 NOTE — PROGRESS NOTES
This writer called [facility] to inquire about whether or not patient had services through them because she could not remember what was going on with her case and reported that they are not helping her.  An assessment for adult services was done and her  is Chuck Page [phone].

## 2023-06-16 NOTE — ED NOTES
Bernice called and set for  at 3:45 pm. Please have patient in lobby 10 minutes prior. Patient will be sent to [address].

## 2023-06-16 NOTE — DISCHARGE INSTRUCTIONS
Aftercare Plan  If I am feeling unsafe or I am in a crisis, I will:   Contact my established care providers   Call the National Suicide Prevention Lifeline: 813.881.6705   Go to the nearest emergency room   Call 911     Warning signs that I or other people might notice when a crisis is developing for me:     I am having increasing suicidal thoughts that turn to plans with intent or means  I am having additional urges to self-harm    My emotions are of hopelessness; feeling like there's no way out.  Rage or anger.  Engaging in risky activities without thinking  Withdrawing from family/friends  Dramatic mood swings  Drastic personality changes   Use of alcohol or drugs  Postings on social media  Neglect of personal hygiene or cares     Things I am able to do on my own to cope or help me feel better:    Spending quality time with loved ones  Staying hydrated  Eating balanced meals  Going for a walk every day  Take care of daily responsibilities/needs  Focus on positive self-talk vs negative self-talk    Things that I am able to do with others to cope or help me better:   Exercise  Music  Deep breathing  Meditations  Journal  Self-regulate  Self check-in  Ask for help    Things I can use or do for distraction:   Reach out to/spend time with family, friends  Shower  Exercise  Chores or do a project  Listen to music  Watch movie/TV  Listening to music  Journaling  Reading a book  Meditating  Call a friend    Changes I can make to support my mental health and wellness:    -I will abstain from all mood altering chemicals not currently prescribed to me   -I will attend scheduled mental health therapy and psychiatric appointments and follow all   recommendations  -I will commit to 30 minutes of self care daily - this can be as simple as taking a shower, going for a   walk, cooking a meal, read, writing, etc  -I will practice square breathing when I begin to feel anxious - in breath through the nose for the count   of 4 and  the first line on the square. Out breath through the mouth for the count of 4 for the second line   of the square. Repeat to complete the square. Repeat the square as many times as needed.  - I will use distraction skills of: going for walks, watching TV, spending time outside, calling a friend or   family member  -Use community resources, including APS numbers, [facility] crisis and support meetings  -Maintain a daily schedule/routine  -Practice deep breathing skills  -Download a meditation gale and spend 15-20 minutes per day mediating/relaxing. Some apps to   download include: Calm, Headspace and Insight Timer. All 3 of these apps have free version    Reduce Extreme Emotion  QUICKLY:  Changing Your Body Chemistry      T:  Change your body Temperature to change your autonomic nervous system   Use Ice Water to calm yourself down FAST   Put your face in a bowl of ice water (this is the best way; have the person keep his/her face in ice water for 30-45 seconds - initial research is showing that the longer s/he can hold her/his face in the water, the better the response), or   Splash ice water on your face, or hold an ice pack on your face      I:  Intensely exercise to calm down a body revved up by emotion   Examples: running, walking fast, jumping, playing basketball, weight lifting, swimming, calisthenics, etc.   Engage in exercises that DO NOT include violent behaviors. Exercises that utilize violent behaviors tend to function as  behavioral rehearsal,  and rather than calming the person down, may actually  rev  the person up more, increasing the likelihood of violence, and lessening the likelihood that they will  burn off  energy     P:  Progressively relax your muscles   Starting with your hands, moving to your forearms, upper arms, shoulders, neck, forehead, eyes, cheeks and lips, tongue and teeth, chest, upper back, stomach, buttocks, thighs, calves, ankles, feet   Tense (10 seconds,   of the way), then relax  each muscle (all the way)   Notice the tension   Notice the difference when relaxed (by tensing first, and then relaxing, you are able to get a more thorough relaxation than by simply relaxing)      P: Paced breathing to relax   The standard technique is to begin with counting the number of steps one takes for a typical inhale, then counting the steps one takes for a typical exhale, and then lengthening the amount of steps for the exhalation by one or two steps.  OR  Repeat this pattern for 1-2 minutes  Inhale for four (4) seconds   Exhale for six (6) to eight (8) seconds   Research demonstrated that one can change one's overall level of anxiety by doing this exercise for even a few minutes per day      People in my life that I can ask for help:   Family  Friends  Providers    Your county has a mental health crisis team you can call 24/7:   [facility] Crisis Line Number: [phone]  [facility] Mental Health Crisis: [phone] - Call the crisis line for immediate mental health support, 24 hours a day.   [facility] Crisis Line Number: [phone]  [facility] Crisis Line Number: [phone]  [facility] Crisis Line Number: [phone]   [facility] Crisis Line Number: [phone]  [place]: [phone]  [place]: [phone]  [facility] Crisis Number: [phone]  [facility] Crisis: [phone]      Other things that are important when I'm in crisis:   Ask for help    Additional resources and information:     Mental Health Apps  My3  https://my3app.org/    VirtualHopeBox  https://SocialDeck.org/apps/virtual-hope-box/       Professionals or Agencies I Can Contact During A Crisis:       Crisis Lines  Call or Text 988 - National Suicide and Crisis Lifeline    Crisis Text Line  Text 549240  You will be connected with a trained live crisis counselor to provide support.    The [employer] Project (LGBTQ Youth Crisis Line)  9.609.259.6834  text START  "to 216-562    National [place] on Mental Illness (MEGHAN)  346.935.1049 or 5.338.MEGHAN.HELPS    National Suicide Prevention Lifeline at 5-156-362-TYTD (8593)     Throughout  [place]: call **CRISIS (**481419)     Crisis Text Line: is available for free, 24/7 by texting [place] to 932575    Community Resources  Fast Tracker  Linking people to mental health and substance use disorder resources  [employer]     [place] Mental Health Warm Line  Peer to peer support  Monday thru Saturday, 12 pm to 10 pm  461.434.8903 or 2.102.851.2720  Text \"Support\" to 61198     National [place] on Mental Illness (www.[place].meghan.org): 555.925.9855 or 281-184-5757     [facility] Phone (free remote counseling): 337.501.3842 Web address:   https://RepairPal.org/     www.NextCloud (filter for insurance, gender preference, etc.)    [facility]   [phone]  Intake appointment will be virtual, following appointments can be in person or virtual.   **IMMEDIATE OPENINGS**    [facility]  [phone]  *offers individual therapy, medication management and Mental Health Case Workers; can self refer    [facility]  [phone]  *Immediate Openings    [facility]  [phone]  *Immediate Openings    [facility]  [phone]  *Immediate Openings    Please follow up with scheduled providers to ensure all necessary paperwork is filled out prior to your   scheduled telehealth appointments.     Coordinators from [facility] will be calling within two business days to ensure   that you have the resources you may need or provide assistance with scheduling (Phone number: [phone].).    Remember: give the referrals 3 sessions prior to calling it quits. Do you trust them? Do you feel   understood? Do you think they can help? Check in with yourself after each session    Please reach out to the Diagnostic Evaluation " [place]([phone]) regarding further mental health appointment needs for this emergency department visit.    [facility] SCHEDULING:  Today you were seen by a licensed mental health professional through [facility] sevices, [facility] (P)  for a crisis assessment in the Emergency Department at [facility].  It is recommended that you follow up with your estabished providers (psychiatrist, menta health therapist, and/or primary care doctor - as relevant) as soon as possible. Coordinators from [facility] will be calling you in the next 24-48 hours to ensure that you have the resources you need.  You can so contact [facility] coordinators directly at [phone].     [facility] maintains an extensive network of licensed behavioral health providers to connect patients with the services they need.  We do not charge providers a fee to participate in our referral network.  We match patients with providers based on a patient s specific needs, insurance coverage, and location.  Our first effort will be to refer you to a provider within your care system, and will utilize providers outside your care system as needed.

## 2023-06-16 NOTE — PROGRESS NOTES
"Triage & Transition Services, Extended Care     Bria Breen  June 16, 2023    Bria is followed related to Complex Care Plan which indicates housing needs. Please see initial DEC Crisis Assessment completed for complete assessment information. Medical record is reviewed.  While patient is in the ED, care team is working towards Learn and Demonstrate at [facility] [employer] Focused on Crisis Stabilization. Additional notes include - patient non-compliant with her medications and complains of auditory hallucinations.  Patient denied SI/HI plan/intent.  Patient presented as irritable to this  and said, \"I just need some funckin help and do not have housing.  [facility] [place] was supposed to help me set up and they have not done anything for me.\"  This writer contacted worker, Chuck Page [phone] and she indicated that they are setting her up with a chaim waiver for housing but it takes time.    There are not significant status changes.     Recommend EC to continue care coordination with a crisis residence.    Plan:  Patient reported that [place] the airport [place] due to homelessness and increased depression.   Pt denies SI, HI, NSSIB,and substance abuse issues, but endorsed auditory hallucinations.  Pt is not at risk for harm to theirself or others. Patient was slated to go to [facility] but they gave their bed away early this a.m., so patient is awaiting a crisis bed throughout [facility].    Plan for Care reviewed with Assigned Medical Provider? Yes. Provider, Dr. Batista, response: in agreement    Extended Care will follow and meet with patient/family/care team as able or requested.     Yue Patel, [facility], Extended Care   [phone]          "

## 2023-06-16 NOTE — CONSULTS
"Diagnostic Evaluation Consultation  Crisis Assessment    Patient was assessed: In Person  Patient location: [facility] ED   Was a release of information signed: No. Reason: re entry crisis       Referral Data and Chief Complaint  Bria is a 42 year old, who uses he/him pronouns, and presents to the ED via EMS. Patient is referred to the ED by self. Patient is presenting to the ED for the following concerns: mental health concerns.      Informed Consent and Assessment Methods     Patient is his own guardian. Writer met with patient and explained the crisis assessment process, including applicable information disclosures and limits to confidentiality, assessed understanding of the process, and obtained consent to proceed with the assessment. Patient was observed to be able to participate in the assessment as evidenced by responding to assessment questions. Assessment methods included conducting a formal interview with patient, review of medical records, collaboration with medical staff, and obtaining relevant collateral information from family and community providers when available..     Over the course of this crisis assessment provided reassurance, offered validation, engaged patient in problem solving and disposition planning and worked with patient on safety and aftercare planning. Patient's response to interventions was positive.      Summary of Patient Situation     Pt presents to ED via EMS. [place] the airport and requested transfer to hospital for mental health assessment. Pt denies SI, but reports increasing depression that has been difficult to manage. Pt does not have providers at this time and is homeless. Pt states that he does not remember being at the airport earlier today, stating, \"I have memory loss sometimes\". Denies substance use and urine screen was negative. Pt has hx of polysubstance abuse and psychotic episodes but denies currently symptoms. See clinical history and collateral section for " more information. Pt denies HI, NSSIB, psychotic symptoms, and substance abuse. Previous hospitilization November 2022 for psychotic episode, likely drug induced. Mother reports pt has a history of traveling around the states impulsively, especially since his divorce in 2022. Mother tries to be involved in pt's care but pt is typically not willing to let this happen. Pt is not at risk for harm to himself or others. Would like his depression to be managed and wants support in finding housing. Pt is open to a crisis residence.     Brief Psychosocial History     Pt is currently homeless. Pt has a mother and father that he connects with decently often. Pt has struggled to maintain a job. Trying to get back on SSDI because he is low on finances. Pt feels he has minimal supports. Had a felony charge in [place] (or [place]?) for stealing a credit card. Pt graduated from college. Has two marriages, both ending in divorce. Most recent divorce finalized in early 2023.     Significant Clinical History     Hx of schizoaffective disorder, JOHANNA, OCD, BPD. Hx of anorexia and bulimia. Hx of being prescribed zyprexa, adderall, seroquel, and ativan per mothers report. Pt denies having providers currently. In the past, pt had a  and psychiatrist. Hx of psychiatric hospitalizations, most recently at [facility] [place] [facility] 11/02-11/07 for psychotic symptoms. Hx of SI but no attempts. Hx of commitment in 2011. Hx of psychotic symptoms including delusions, AH, VH, paranoia. Denies current symptoms, though.      Collateral Information  The following information was received from Kia whose relationship to the patient is mother. Information was obtained via phone. Their phone number is [phone] and they last had contact with patient on yesterday.    What happened today: Not sure. He called me yesterday to check in. He seemed to be doing okay.     What is different about patient's functioning: He has been struggling for the past year  since having a divorce from his wife. He has eloped out of the state many times via flying or taking the bus. States that voices cause him to do stuff like this. He has been living a dangerous life and engaging in risky behaviors. We have tried to help him out by trying to get him into long term housing or treatment but it's difficult.     Concern about alcohol/drug use: No    What do you think the patient needs: Long term housing or crisis housing which has been useful in the past     Has patient made comments about wanting to kill themselves/others:  No    If d/c is recommended, can they take part in safety/aftercare planning: No; he doesn't want me involved usually.     Other information: Mom is pursuing guardianship of pt.      Risk Assessment  [place] Suicide Severity Rating Scale Full Clinical Version: 01/24/23  Suicidal Ideation  1. Wish to be Dead (Lifetime): Yes  1. Wish to be Dead (Past 1 Month): No  2. Non-Specific Active Suicidal Thoughts (Lifetime): Yes  2. Non-Specific Active Suicidal Thoughts (Past 1 Month): No  3. Active Suicidal Ideation with any Methods (Not Plan) Without Intent to Act (Lifetime): Yes  3. Active Suicidal Ideation with any Methods (Not Plan) Without Intent to Act (Past 1 Month): No  4. Active Suicidal Ideation with Some Intent to Act, Without Specific Plan (Lifetime): Yes  4. Active Suicidal Ideation with Some Intent to Act, Without Specific Plan (Past 1 Month): No  5. Active Suicidal Ideation with Specific Plan and Intent (Lifetime): No  Intensity of Ideation  Most Severe Ideation Rating (Lifetime): 4  Most Severe Ideation Rating (Past 1 Month):  (0)  Frequency (Lifetime): Many times each day  Frequency (Past 1 Month):  (0)  Duration (Lifetime): More than 8 hours/persistent or continuous  Controllability (Lifetime): Can control thoughts with a lot of difficulty  Controllability (Past 1 Month):  (0)  Deterrents (Lifetime): Deterrents most likely did not stop you  Deterrents (Past 1  Month): Does not apply  Reasons for Ideation (Lifetime): Equally to get attention, revenge, or a reaction from others and to end/stop the pain  Reasons for Ideation (Past 1 Month): Does not apply  Suicidal Behavior  Actual Attempt (Lifetime): No  Has subject engaged in non-suicidal self-injurious behavior? (Lifetime): No  Interrupted Attempts (Lifetime): No  Aborted or Self-Interrupted Attempt (Lifetime): No  Preparatory Acts or Behavior (Lifetime): No  C-SSRS Risk (Lifetime/Recent)  Calculated C-SSRS Risk Score (Lifetime/Recent): Moderate Risk       [place] Suicide Severity Rating Scale Full Clinical Version:No risk   Suicidal Ideation  1. Wish to be Dead (Lifetime): Yes  1. Wish to be Dead (Past 1 Month): No  2. Non-Specific Active Suicidal Thoughts (Lifetime): Yes  2. Non-Specific Active Suicidal Thoughts (Past 1 Month): No  3. Active Suicidal Ideation with any Methods (Not Plan) Without Intent to Act (Lifetime): Yes  3. Active Suicidal Ideation with any Methods (Not Plan) Without Intent to Act (Past 1 Month): No  4. Active Suicidal Ideation with Some Intent to Act, Without Specific Plan (Lifetime): No  5. Active Suicidal Ideation with Specific Plan and Intent (Lifetime): No  Intensity of Ideation  Most Severe Ideation Rating (Lifetime): 4  Most Severe Ideation Rating (Past 1 Month): 2  Frequency (Lifetime): Less than once a week  Frequency (Past 1 Month): Less than once a week  Duration (Lifetime): Less than 1 hour/some of the time  Duration (Past 1 Month): Fleeting, few seconds or minutes  Controllability (Lifetime): Can control thoughts with some difficulty  Controllability (Past 1 Month): Easily able to control thoughts  Deterrents (Lifetime): Deterrents definitely stopped you from attempting suicide  Deterrents (Past 1 Month): Deterrents definitely stopped you from attempting suicide  Reasons for Ideation (Lifetime): Equally to get attention, revenge, or a reaction from others and to end/stop the  pain  Reasons for Ideation (Past 1 Month): Equally to get attention, revenge, or a reaction from others and to end/stop the pain  Suicidal Behavior  Actual Attempt (Lifetime): No  Has subject engaged in non-suicidal self-injurious behavior? (Lifetime): No  Interrupted Attempts (Lifetime): No  Aborted or Self-Interrupted Attempt (Lifetime): No  Preparatory Acts or Behavior (Lifetime): No  C-SSRS Risk (Lifetime/Recent)  Calculated C-SSRS Risk Score (Lifetime/Recent): No Risk Indicated          Validity of evaluation is not impacted by presenting factors during interview.   Comments regarding subjective versus objective responses to [place] tool: n/a  Environmental or Psychosocial Events: legal issues such as DWI, DUI, lawsuit, CPS involvement, etc., challenging interpersonal relationships, barriers to accessing healthcare, other life stressors and neither working nor attending school  Chronic Risk Factors: history of psychiatric hospitalization, history of abuse or neglect, LGBTQ+ orientation  and serious, persistent mental illness   Warning Signs: withdrawing from friends, family, and society  Protective Factors: responsibilities and duties to others, including pets and children and help seeking  Interpretation of Risk Scoring, Risk Mitigation Interventions and Safety Plan:  No risk currently valid. Pt denies suicidal thoughts.     Does the patient have thoughts of harming others? No     Is the patient engaging in sexually inappropriate behavior?  no  ; hx of hypersexual behavior in the past per mother       Current Substance Abuse     Is there recent substance abuse? no ; hx of meth and cocaine use.      Was a urine drug screen or blood alcohol level obtained: Yes negative        Mental Status Exam     Affect: Dramatic   Appearance: Disheveled    Attention Span/Concentration: Attentive  Eye Contact: Engaged   Fund of Knowledge: Delayed    Language /Speech Content: Fluent   Language /Speech Volume: Normal     Language /Speech Rate/Productions: Minimally Responsive    Recent Memory: Poor   Remote Memory: Poor   Mood: Anxious and Depressed    Orientation to Person: Yes    Orientation to Place: Yes   Orientation to Time of Day: Yes    Orientation to Date: Yes    Situation (Do they understand why they are here?): Yes    Psychomotor Behavior: Underactive    Thought Content: Clear   Thought Form: Intact      History of commitment: Yes 2011    Medication    Psychotropic medications: No current medications but a history of various medications. exact meds unknown. .  Medication changes made in the last two weeks: No       Current Care Team    Primary Care Provider: No  Psychiatrist: No  Therapist: No  : No     CTSS or ARMHS: No  ACT Team: No  Other: No      Diagnosis  Schizoaffective disorder, Depressive type F25.1 - Primary, By history   Borderline personality disorder F60.3 - By history   Generalized anxiety disorder F41.1 - By history       Clinical Summary and Substantiation of Recommendations    Pt presents to ED for mental health concerns. Pt reports increasing depression and housing related concerns. Pt denies SI, HI, NSSIB, psychotic symptoms, and substance abuse. Pt is not at risk for harm to himself or others. Pt is open to transferring to [facility] crisis [facility], which has a current opening. Pt will complete phone screen for this facility at 8:30 AM. Pt agreeable to plan. Denies need or want for further referrals at this time.   Disposition    Recommended disposition: Other: Crisis facility        Reviewed case and recommendations with attending provider. Attending Name: Dr. Rendon       Attending concurs with disposition: Yes       Patient and/or validated legal guardian concurs with disposition: Yes       Final disposition: Other: Crisis facility .       Outpatient Details (if applicable):   Aftercare plan and appointments placed in the AVS and provided to patient: No. Rationale: pt not discharged yet      Was lethal means counseling provided as a part of aftercare planning? No;       Assessment Details    Patient interview started at: 4:20 am and completed at: 5:20 am.     Total duration spent on the patient case in minutes: 1.50 hrs      CPT code(s) utilized: 16122 - Psychotherapy for Crisis - 60 (30-74*) min       LAURA Bueno, Psychotherapist Trainee, Psychotherapist  DEC - Triage & Transition Services  Callback: [phone]

## 2023-06-16 NOTE — ED NOTES
Writer called [facility] to make referral. [facility] stated they no longer have a female bed available.
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
## 2023-06-16 NOTE — ED TRIAGE NOTES
[place] the airport, wants to be seen for mental health evaluation, pt denies SI and HI     Triage Assessment     Row Name 06/16/23 0051       Triage Assessment (Adult)    Airway WDL WDL       Respiratory WDL    Respiratory WDL WDL       Skin Circulation/Temperature WDL    Skin Circulation/Temperature WDL WDL       Cardiac WDL    Cardiac WDL WDL       Peripheral/Neurovascular WDL    Peripheral Neurovascular WDL WDL       Cognitive/Neuro/Behavioral WDL    Cognitive/Neuro/Behavioral WDL WDL

## 2023-06-16 NOTE — PLAN OF CARE
Bria Breen  June 16, 2023  Plan of Care Hand-off Note     Patient Care Path: Discharge    Plan for Care:     Pt presents to ED for mental health concerns. Pt reports increasing depression and housing related concerns. Pt denies SI, HI, NSSIB, psychotic symptoms, and substance abuse. Pt is not at risk for harm to himself or others. Pt is open to transferring to [facility], which has a current opening. Pt will complete phone screen for this facility at 8:30 AM. Pt agreeable to plan. Denies need or want for further referrals at this time    Critical Safety Issues: n/a    Overview:  This patient is a child/adolescent: No    This patient has additional special visitor precautions: No    Legal Status: Voluntary    Contacts:   Mother - [phone] (mary)     Psychiatry Consult:  Psychiatry Consult not requested because pt discharging     Updated Attending Provider regarding plan of care.    Bill Dorado LGSW

## 2023-07-06 ENCOUNTER — TELEPHONE (OUTPATIENT)
Dept: BEHAVIORAL HEALTH | Facility: CLINIC | Age: 43
End: 2023-07-06
Payer: COMMERCIAL

## 2023-07-06 ENCOUNTER — HOSPITAL ENCOUNTER (EMERGENCY)
Facility: CLINIC | Age: 43
Discharge: HOME | End: 2023-07-08
Attending: EMERGENCY MEDICINE | Admitting: EMERGENCY MEDICINE
Payer: COMMERCIAL

## 2023-07-06 DIAGNOSIS — R45.851 SUICIDAL IDEATION: ICD-10-CM

## 2023-07-06 PROCEDURE — 99285 EMERGENCY DEPT VISIT HI MDM: CPT | Mod: 25 | Performed by: EMERGENCY MEDICINE

## 2023-07-06 PROCEDURE — 250N000013 HC RX MED GY IP 250 OP 250 PS 637: Performed by: EMERGENCY MEDICINE

## 2023-07-06 PROCEDURE — 99285 EMERGENCY DEPT VISIT HI MDM: CPT | Performed by: EMERGENCY MEDICINE

## 2023-07-06 PROCEDURE — 90791 PSYCH DIAGNOSTIC EVALUATION: CPT

## 2023-07-06 RX ORDER — OLANZAPINE 10 MG/1
10 TABLET, ORALLY DISINTEGRATING ORAL ONCE
Status: COMPLETED | OUTPATIENT
Start: 2023-07-06 | End: 2023-07-06

## 2023-07-06 RX ORDER — LISINOPRIL 5 MG/1
5 TABLET ORAL DAILY
COMMUNITY
End: 2023-09-22

## 2023-07-06 RX ORDER — EPINEPHRINE 0.3 MG/.3ML
0.3 INJECTION SUBCUTANEOUS PRN
COMMUNITY
End: 2023-09-22

## 2023-07-06 RX ORDER — OLANZAPINE 5 MG/1
5 TABLET ORAL AT BEDTIME
COMMUNITY
End: 2023-09-12

## 2023-07-06 RX ORDER — LORAZEPAM 1 MG/1
1 TABLET ORAL ONCE
Status: COMPLETED | OUTPATIENT
Start: 2023-07-06 | End: 2023-07-06

## 2023-07-06 RX ADMIN — LORAZEPAM 1 MG: 1 TABLET ORAL at 11:42

## 2023-07-06 RX ADMIN — OLANZAPINE 10 MG: 10 TABLET, ORALLY DISINTEGRATING ORAL at 19:44

## 2023-07-06 ASSESSMENT — COLUMBIA-SUICIDE SEVERITY RATING SCALE - C-SSRS
TOTAL  NUMBER OF INTERRUPTED ATTEMPTS SINCE LAST CONTACT: NO
2. HAVE YOU ACTUALLY HAD ANY THOUGHTS OF KILLING YOURSELF?: YES
SUICIDE, SINCE LAST CONTACT: NO
ATTEMPT SINCE LAST CONTACT: NO
6. HAVE YOU EVER DONE ANYTHING, STARTED TO DO ANYTHING, OR PREPARED TO DO ANYTHING TO END YOUR LIFE?: NO
1. SINCE LAST CONTACT, HAVE YOU WISHED YOU WERE DEAD OR WISHED YOU COULD GO TO SLEEP AND NOT WAKE UP?: YES
TOTAL  NUMBER OF ABORTED OR SELF INTERRUPTED ATTEMPTS SINCE LAST CONTACT: NO
5. HAVE YOU STARTED TO WORK OUT OR WORKED OUT THE DETAILS OF HOW TO KILL YOURSELF? DO YOU INTEND TO CARRY OUT THIS PLAN?: YES

## 2023-07-06 ASSESSMENT — ACTIVITIES OF DAILY LIVING (ADL)
ADLS_ACUITY_SCORE: 35

## 2023-07-06 NOTE — ED NOTES
Bed: [facility]  Expected date: 7/6/23  Expected time: 10:41 AM  Means of arrival: Ambulance  Comments:  SI

## 2023-07-06 NOTE — ED TRIAGE NOTES
Pt presented to ED via EMS w/ c/o anxiety and SI for the past 2 wks. Pt denies any specific plan at this time. Pt requesting xyprexa and ativan. Pt has been staying in extended stay for 2 wks and is between homes.     Triage Assessment     Row Name 07/06/23 1054       Triage Assessment (Adult)    Airway WDL WDL       Respiratory WDL    Respiratory WDL WDL       Cardiac WDL    Cardiac WDL WDL       Cognitive/Neuro/Behavioral WDL    Cognitive/Neuro/Behavioral WDL all    Level of Consciousness alert    Mood/Behavior calm

## 2023-07-06 NOTE — CONSULTS
"Diagnostic Evaluation Consultation  Crisis Assessment    Patient was assessed: I-Pad  Patient location: declocations: [facility]  Was a release of information signed: No. Reason: pt refused      Referral Data and Chief Complaint  Bria \"Max\" Nuha is a 89 year old, who uses he/him pronouns, and presents to the ED via EMS. Patient is referred to the ED by self. Patient is presenting to the ED for the following concerns: Suicidal ideation and hallucinations.      Informed Consent and Assessment Methods     Patient is his own guardian. Writer met with patient and explained the crisis assessment process, including applicable information disclosures and limits to confidentiality, assessed understanding of the process, and obtained consent to proceed with the assessment. Patient was observed to be able to participate in the assessment as evidenced by cooperative. Assessment methods included conducting a formal interview with patient, review of medical records, collaboration with medical staff, and obtaining relevant collateral information from family and community providers when available..     Over the course of this crisis assessment provided reassurance, offered validation and engaged patient in problem solving and disposition planning. Patient's response to interventions was receptive     Summary of Patient Situation    Patient is a 43-year-old female who identifies as \"Max\" history of anxiety, major depressive disorder, and psychosis who comes in the hospital brought in by EMS due to concerns of anxiety, depression and suicidal ideation.  Patient reports that they have been feeling this way for the last few weeks and does not feel like himself.  He describes that he is typically energetic, personable and more engaged however lately seems that he cannot \"get my game on\".  Patient reports a personal plan of wanting [place] [address] [place] and reports intent of doing so.    Patient " "also reports a longstanding history of auditory hallucinations that occur on a constant basis.  Patient states they are not able to specifically identify who the voices are, and denied that they actually command for the patient do anything harmful.  Patient states that they are just \"always *uckin there.\"     Patient also reports ongoing anxiety with a lot of rumination where certain words will get inside of her head and cannot get them out.  Patient also reports a sense of paranoia feeling as though somehow they are being watched but is not able to specifically identify who it might be.  Patient does not currently feel stable and feels that they are not able to effectively function by themselves at this time.      Brief Psychosocial History    Pt has been staying in a hotel for the past two months with her friend. Pt was raised by her mother and father. Pt is not currently employed. Pt denies any current hobbies. Pt states people on the internet are her primary supports. Pt has two previous marriages yet no children. Pt denies any legal issues. Pt reports they are Budhist.     Significant Clinical History     Pt reports previous mental health dxs of Anxiety and ADHD. Pt reports around 15 previous inpatient hospitalizations, with the most recent occurring around one month ago. Pt reports a hx of therapy and psychiatry, yet no current established providers.      Collateral Information    None     Risk Assessment  Door Suicide Severity Rating Scale Since Last Contact: Completed on 7/6/2023  Suicidal Ideation (Since Last Contact)  1. Wish to be Dead (Since Last Contact): Yes  2. Non-Specific Active Suicidal Thoughts (Since Last Contact): Yes  3. Active Suicidal Ideation with any Methods (Not Plan) Without Intent to Act (Since Last Contact): Yes  4. Active Suicidal Ideation with Some Intent to Act, Without Specific Plan (Since Last Contact): Yes  5. Active Suicidal Ideation with Specific Plan and Intent (Since Last " Contact): Yes  Suicidal Behavior (Since Last Contact)  Actual Attempt (Since Last Contact): No  Has subject engaged in non-suicidal self-injurious behavior? (Since Last Contact): No  Interrupted Attempts (Since Last Contact): No  Aborted or Self-Interrupted Attempt (Since Last Contact): No  Preparatory Acts or Behavior (Since Last Contact): No  Suicide (Since Last Contact): No     C-SSRS Risk (Since Last Contact)  Calculated C-SSRS Risk Score (Since Last Contact): High Risk    Validity of evaluation is impacted by presenting factors during interview: hallucinations.   Comments regarding subjective versus objective responses to Weinert tool:   Environmental or Psychosocial Events: loss of relationship due to divorce/separation, bullied/abused, work or task failure, helplessness/hopelessness, impulsivity/recklessness and unemployment/underemployment  Chronic Risk Factors: history of suicide attempts (pt states suicide attempt a few yrs ago), history of psychiatric hospitalization, history of abuse or neglect and serious, persistent mental illness   Warning Signs: seeking access to means to hurt or kill self, talking or writing about death, dying, or suicide, increasing substance use or abuse, anxiety, agitation, unable to sleep, sleeping all the time and dramatic changes in mood  Protective Factors: lives in a responsibly safe and stable environment, sense of importance of health and wellness and help seeking  Interpretation of Risk Scoring, Risk Mitigation Interventions and Safety Plan:  Pt has long hx of suicidal ideation.  Patient reports a concrete plan of wanting to jump off the bridge due to ongoing stressors with lack of stable housing and ongoing hallucinations.       Does the patient have thoughts of harming others? No     Is the patient engaging in sexually inappropriate behavior?  no        Current Substance Abuse     Is there recent substance abuse? Substance type(s): meth, reports using meth for the  "first time yesterday Frequency: has hx of meth use Quantity: unknown Method: smoking Duration: used yesterday Last use: used yesterday  Was a urine drug screen or blood alcohol level obtained: No       Mental Status Exam     Affect: Labile   Appearance: Appropriate    Attention Span/Concentration: Attentive and Inattentive  Eye Contact: Variable   Fund of Knowledge: Appropriate    Language /Speech Content: Fluent   Language /Speech Volume: Normal    Language /Speech Rate/Productions: Normal    Recent Memory: Intact   Remote Memory: Intact   Mood: Depressed    Orientation to Person: Yes    Orientation to Place: Yes   Orientation to Time of Day: Yes    Orientation to Date: Yes    Situation (Do they understand why they are here?): Yes    Psychomotor Behavior: Hyperactive and Normal    Thought Content: Suicidal and paranoia  Thought Form: Intact and Tangential      History of commitment: Yes hx of committment back in 2011       Medication    Psychotropic medications: Yes. Pt is currently taking adderall, abilify. Medication compliant: No: pt not taking meds consistently. Recent medication changes: No  Medication changes made in the last two weeks: No       Current Care Team    Primary Care Provider: Yes. Name: Dionne Morrison. Location: [facility]. Date of last visit: 6/2023. Frequency: varies. Perceived helpfulness: \"Kalyani\".  Psychiatrist: No  Therapist: No  : No     CTSS or ARMHS: No  ACT Team: No  Other: No      Diagnosis    298.9 (F29)  Unspecified Schizophrenia Spectrum   300.00 (F41.9) Unspecified Anxiety Disorder - by history       Clinical Summary and Substantiation of Recommendations    Patient comes in the hospital brought in by EMS due to suicidal ideation that has been ongoing for the last 2 weeks with a [place].  Patient also reports ongoing difficulties managing auditory hallucinations which the patient was visibly struggling with throughout the " interview.  The patient presents with a labile and disorganized behavior during the interview.  The patient was visibly seen responding to some internal stimuli and at times carrying on a conversation with someone who was not in the room.    The patient is not willing to contract for safety at this time and is seeking increasing stabilization for improvement in mental health.  The patient does acknowledge recent meth use so is unclear how much of this is contributing to the patient's symptoms.  At this time Dr. Gay and I agreed that seeking out admission would be most appropriate at this time and in the event a possible reassessment if the patient does not wish to remain hospitalized.  Admission to Inpatient Level of Care is indicated due to:    1. Patient risk of severity of behavioral health disorder is appropriate to proposed level of care as indicated by:    Imminent Risk of Harm: Current plan for suicide or serious harm to self is present  And/or:  Behavioral health disorder is present and appropriate for inpatient care with both of the following:     Severe psychiatric, behavioral or other comorbid conditions are appropriate for management at inpatient mental health as indicated by at least one of the following:   o Hallucinations; delusions; positive symptoms, Depressive symptoms and Anxiety, Impaired impulse control, judgement, or insight and Other emotional behavioral or behavioral disturbance     Severe dysfunction in daily living is present as indicated by at least one of the following:   o Other evidence of severe dysfunction    2. Inpatient mental health services are necessary to meet patient needs and at least one of the following:  Specific condition related to admission diagnosis is present and judged likely to deteriorate in absence of treatment at proposed level of care    3. Situation and expectations are appropriate for inpatient care, as indicated by one of the following:   Voluntary treatment  at lower level of care is not feasible    No current facility-administered medications for this encounter.     Current Outpatient Medications   Medication     amphetamine-dextroamphetamine (ADDERALL) 10 MG tablet     ARIPiprazole (ABILIFY) 15 MG tablet     diphenhydrAMINE (BENADRYL) 50 MG capsule     LORazepam (ATIVAN) 0.5 MG tablet     OLANZapine (ZYPREXA) 10 MG tablet     OLANZapine (ZYPREXA) 20 MG tablet         Disposition    Recommended disposition: Inpatient Mental Health       Reviewed case and recommendations with attending provider. Attending Name: Dr. Gay       Attending concurs with disposition: Yes       Patient and/or validated legal guardian concurs with disposition: Yes       Final disposition: Inpatient mental health .     Inpatient Details (if applicable):   Is patient admitted voluntarily:Yes      Patient aware of potential for transfer if there is not appropriate placement? Yes       Patient is willing to travel outside of the [facility] for placement? No      Behavioral Intake Notified? Yes: Date: 7/6/2023 Time: 12:45 pm.     Was lethal means counseling provided as a part of aftercare planning? No;       Assessment Details    Patient interview started at: 11:40 am and completed at: 12:15 pm.     Total time spent with the patient or their family: .50 hrs      CPT code(s) utilized: 17514 - Psychotherapy for Crisis - 60 (30-74*) min       Cali Pacheco MA, BENNY, Psychotherapist  [facility] - Triage & Transition Services  Callback: [phone]      Aftercare Plan  If I am feeling unsafe or I am in a crisis, I will:   Contact my established care providers   Call the National Suicide Prevention Lifeline: 988  Go to the nearest emergency room   Call 911     Warning signs that I or other people might notice when a crisis is developing for me: isolation, not reaching out to others     Things I am able to do on my own to cope or help me feel better: making Tik Veyo Videos      Things that I am able to do with  "others to cope or help me better: talking to others, making other people laugh     Things I can use or do for distraction: reading      Changes I can make to support my mental health and wellness: \"I don't really know\"     People in my life that I can ask for help: pt's girlfriend      Your county has a mental health crisis team you can call 24/7: [facility]  [phone] (adults)  [phone] (children)    Other things that are important when I'm in crisis: None     Additional resources and information: None        Crisis Lines  Crisis Text Line  Text 912626  You will be connected with a trained live crisis counselor to provide support.    Por espanol, texto  MATILDE a 756205 o texto a 442-AYUDAME en WhatsApp    The [employer] (LGBTQ Youth Crisis Line)  8.654.102.3740  text START to 769-713      Community Resources  Fast Tracker  Linking people to mental health and substance use disorder resources  [employer]     [place] Mental Health Warm Line  Peer to peer support  Monday thru Saturday, 12 pm to 10 pm  107.006.0352 or 7.654.011.7110  Text \"Support\" to 19672    National [employer]ine on Mental Illness (MEGHAN)  214.353.5086 or 1.888.MEGHAN.HELPS      Mental Health Apps  My3  https://mySoundOutpp.org/    VirtualHopeBox  https://Amimon.org/apps/virtual-hope-box/      Additional Information  Today you were seen by a licensed mental health professional through Triage and Transition services, [facility] ([facility])  for a crisis assessment in the Emergency Department at [facility].  It is recommended that you follow up with your established providers (psychiatrist, mental health therapist, and/or primary care doctor - as relevant) as soon as possible. Coordinators from [facility] will be calling you in the next 24-48 hours to ensure that you have the resources you need.  You can also contact [facility] coordinators directly at [phone]. You may have been scheduled for or " offered an appointment with a mental health provider. [facility] maintains an extensive network of licensed behavioral health providers to connect patients with the services they need.  We do not charge providers a fee to participate in our referral network.  We match patients with providers based on a patient's specific needs, insurance coverage, and location.  Our first effort will be to refer you to a provider within your care system, and will utilize providers outside your care system as needed.

## 2023-07-06 NOTE — PLAN OF CARE
Bria E Nuha  July 6, 2023  Plan of Care Hand-off Note     Patient Care Path: Inpatient Mental Health    Plan for Care:     Patient comes in the hospital brought in by EMS due to suicidal ideation that has been ongoing for the last 2 weeks with a [place].  Patient also reports ongoing difficulties managing auditory hallucinations which the patient was visibly struggling with throughout the interview.  The patient presents with a labile and disorganized behavior during the interview.  The patient was visibly seen responding to some internal stimuli and at times carrying on a conversation with someone who was not in the room.      The patient is not willing to contract for safety at this time and is seeking increasing stabilization for improvement in mental health.  The patient does acknowledge recent meth use so is unclear how much of this is contributing to the patient's symptoms.  At this time Dr. Gay and I agreed that seeking out admission would be most appropriate at this time and in the event a possible reassessment if the patient does not wish to remain hospitalized.    Critical Safety Issues: pt reports ongoing SI with plan to jump off a bridge.     Overview:  This patient is a child/adolescent: No    This patient has additional special visitor precautions: No    Legal Status: Voluntary    Contacts:   Kia Castro, mother: Contact [phone]    Psychiatry Consult:  Psychiatry Consult not requested because pt will be admitted into hospital    Updated Attending Provider regarding plan of care.    Cali Pacheco

## 2023-07-06 NOTE — TELEPHONE ENCOUNTER
R:  [place]  Access Inpatient Bed Call Log 7/6/23 @ 3:25 pm (metro only):   Intake has called facilities that have not updated their bed status within the last 12 hours.                [facility] is at capacity.       [facility] is posting 0 beds.    [phone]; per call at 3:27 pm to Elvia, they are at cap      [facility] is posting 0 beds. Negative covid required.          [facility] is posting 0 beds. Negative covid required.  [phone] Per call at 3:28 pm to Marylou, they are at cap.     [facility] is posting 0 beds.     per call at 3:29 pm to Kristi, they are at cap.     Pt remains on work list pending appropriate bed avail. trice

## 2023-07-06 NOTE — PHARMACY-ADMISSION MEDICATION HISTORY
Pharmacy Intern Admission Medication History    Admission medication history is complete. The information provided in this note is only as accurate as the sources available at the time of the update.    Medication reconciliation/reorder completed by provider prior to medication history? No    Information Source(s): Patient and CareEverywhere/SureScripts via in-person    Pertinent Information:     Per patient, she has olanzapine 20 mg tablets at home but she splits them in half    Changes made to PTA medication list:    Added:   o Olanzapine 5 mg tablets  o Lisinopril  o Epinephrine pen    Deleted:   o Lorazepam  - Hasn't filled in months  o Aripiprazole  - Hasn't filled in months  o Olanzapine 20 mg tablets  - Duplicate with the olanzapine 10 mg    Changed: None    Allergies reviewed with patient and updates made in EHR: yes    Medication History Completed By: Edelmira Crawley 7/6/2023 6:29 PM    PTA Med List   Medication Sig Last Dose     amphetamine-dextroamphetamine (ADDERALL) 10 MG tablet Take 2 tablets by mouth daily Past Month at unknown     diphenhydrAMINE (BENADRYL) 50 MG capsule Take 50 mg by mouth 2 times daily as needed (extrapyramidal symptoms) Unknown at unknown     EPINEPHrine (ANY BX GENERIC EQUIV) 0.3 MG/0.3ML injection 2-pack Inject 0.3 mg into the muscle as needed for anaphylaxis Unknown     lisinopril (ZESTRIL) 5 MG tablet Take 5 mg by mouth daily 7/4/2023 at unknown     OLANZapine (ZYPREXA) 10 MG tablet Take 1 tablet (10 mg) by mouth 2 times daily 7/5/2023 at pm     OLANZapine (ZYPREXA) 5 MG tablet Take 5 mg by mouth At Bedtime 7/5/2023 at pm

## 2023-07-06 NOTE — ED NOTES
Pt belongings removed, stickers placed, placed on second shelf. 2 belongings bags and 1 large brown [place] bag. Pt tshirt/jeans, phone and wallet remains w/ pt. Pt wanded by female RN
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
## 2023-07-07 ENCOUNTER — TELEPHONE (OUTPATIENT)
Dept: BEHAVIORAL HEALTH | Facility: CLINIC | Age: 43
End: 2023-07-07
Payer: COMMERCIAL

## 2023-07-07 VITALS
BODY MASS INDEX: 30.73 KG/M2 | RESPIRATION RATE: 16 BRPM | WEIGHT: 160 LBS | OXYGEN SATURATION: 100 % | TEMPERATURE: 97.7 F | DIASTOLIC BLOOD PRESSURE: 79 MMHG | HEART RATE: 69 BPM | SYSTOLIC BLOOD PRESSURE: 140 MMHG

## 2023-07-07 PROCEDURE — 250N000013 HC RX MED GY IP 250 OP 250 PS 637: Performed by: EMERGENCY MEDICINE

## 2023-07-07 RX ORDER — DIPHENHYDRAMINE HCL 50 MG
50 CAPSULE ORAL 2 TIMES DAILY PRN
Status: DISCONTINUED | OUTPATIENT
Start: 2023-07-07 | End: 2023-07-08 | Stop reason: HOSPADM

## 2023-07-07 RX ORDER — OLANZAPINE 10 MG/1
10 TABLET, ORALLY DISINTEGRATING ORAL ONCE
Status: COMPLETED | OUTPATIENT
Start: 2023-07-07 | End: 2023-07-07

## 2023-07-07 RX ORDER — LISINOPRIL 5 MG/1
5 TABLET ORAL DAILY
Status: DISCONTINUED | OUTPATIENT
Start: 2023-07-07 | End: 2023-07-08 | Stop reason: HOSPADM

## 2023-07-07 RX ADMIN — DIPHENHYDRAMINE HYDROCHLORIDE 50 MG: 50 CAPSULE ORAL at 22:20

## 2023-07-07 RX ADMIN — OLANZAPINE 10 MG: 10 TABLET, ORALLY DISINTEGRATING ORAL at 04:30

## 2023-07-07 RX ADMIN — LISINOPRIL 5 MG: 5 TABLET ORAL at 08:21

## 2023-07-07 ASSESSMENT — ACTIVITIES OF DAILY LIVING (ADL)
ADLS_ACUITY_SCORE: 35

## 2023-07-07 NOTE — PROGRESS NOTES
Triage & Transition Services, Extended Care    Client Name: Bria Breen    Date: July 7, 2023  Service Type:  Group Therapy     Topic:   Tree [facility]    Intervention:    Group process: support, challenge, affirm, psycho-education.     Response:  Patient did not participate in group d/t sleeping.       Mague Matthews

## 2023-07-07 NOTE — ED NOTES
Patient transferred to [facility] at 1230, walked in escorted by staff.  Was cooperative and compliant with admission process. Reported she was feeling tired and would like to sleep. She fell a sleep right away on the mattress on the floor in the lounge.

## 2023-07-07 NOTE — ED NOTES
Provider does not want to treat blood pressure at this time. Patient states her bp is normally high

## 2023-07-07 NOTE — TELEPHONE ENCOUNTER
11:05pm Intake paged the resident.     12:20am Intake received a call from the resident reporting that the unit is currently short staffed and will not be able to admit tonight. Intake inquired about placing the pt's in queue for admission on days. The resident is uncomfortable with this idea as she would prefer the provider to review admission on days. Intake voiced understanding. Intake reported that they will page the ANS to follow-up on staffing concern.     12:30am Intake paged the ANS.     [facility] Access Inpatient Bed Call Log 7/7/2023 1:05 AM      Intake has called facilities that have not updated their bed status within the last 12 hours.     Adults:         [facility] is posting 0 beds.      [facility] is posting 0 beds. [phone]      [facility] is posting 0 beds. [phone]     [facility] is posting 0 beds. [phone] - 1:07am Per Jaimee they have 2 M SDU    [facility] is posting 0 beds. [phone]     [facility] is posting 0 bed. [phone]      [facility] is posting 0 beds. [phone]     [facility] is posting 0 beds. [phone]     [facility] is posting 0 beds. [phone]     [facility] is posting 1 beds. [phone]       [facility] is posting 1 beds. Mixed unit 12+. Low acuity only.  [phone] - 1:09am Per Elvia- This bed is under review    [facility] is posting 0 beds. No aggression.  [phone]     [facility] is posting 0 beds. [phone]      [facility] is posting 0 beds. [phone]      [facility] is posting 1 bed. [phone]      [facility] is posting 0 beds. Low acuity. [phone]     [facility] is posting 0 beds. 72 hr hold preferred. [phone]     [facility] is posting 3 bed.  [phone]        [facility] is posting 3 bed. Voluntary only- no holds/commitments, No Hx of aggression, violence, or assault. No sexual offenders. No 72 hr holds.  [phone]     [facility] is [facility] 4 beds. (Must have the cognitive ability to do programming. No aggressive or violent behavior or recent HX in the last 2 yrs. MH must be primary.) [phone]    [facility] is posting 0 beds. Low acuity only. Violence and aggression capped. [phone]      [facility] is posting 0 bed. Low acuity, Neg Covid. [phone]     [facility] is posting 3 beds. Covid neg. Vol only. Combined adolescent and adult unit. No aggressive or violent behavior. No registered sex offenders. [phone] - 12:40am Per Supriya, they will have a bed in the AM.     [facility] [place] posting 1 beds. Negative covid.      [facility] is posting 14 beds. Call for details. [phone]      [facility] is posting 4 beds. [phone]      Pt remains on work list pending appropriate bed availability.

## 2023-07-07 NOTE — TELEPHONE ENCOUNTER
R: The patient is currently in the [facility] awaiting placement. [facility]    Intake afternoon Bed Search (Done at 2:19 PM) Facilities that have not updated their [place] MH access site in the last 12 hours have been contacted for bed availability.     [facility] is posting 0 beds.   [facility] is posting 0 beds.  [facility] is posting 0 beds.  [facility] is posting 0 beds.  [facility] is posting 0 beds.  [facility] is posting 0 beds.  [facility] is posting 0 beds.  [facility] is posting 0 beds. Low acuity.    [facility] at capacity. The patient remains on the worklist. Intake continues to identify appropriate inpatient psychiatry placement.

## 2023-07-07 NOTE — ED PROVIDER NOTES
[facility] ED Mental Health Handoff Note:       Brief HPI:  Patient was signed out to me by previous physician see initial ED Provider note for full details of the presentation.   Home meds reviewed and ordered/administered: Yes    Medically stable for inpatient mental health admission: Yes.    Evaluated by mental health: Yes. The recommendation is for inpatient mental health treatment. Bed search in process    Safety concerns: At the time I received sign out, there were no safety concerns.    Emergency department course:  Patient was signed out to me by previous physician.  Currently awaiting transfer or admission for mental health.  There were no issues during my shift.  Patient care will be signed out to the oncoming physician.       Krzysztof Batista,   07/07/23 0798

## 2023-07-07 NOTE — PROGRESS NOTES
"Triage & Transition Services, Extended Care     Therapy Progress Note    Patient: Bria goes by \"Bria,\" uses he/him pronouns  Date of Service: July 7, 2023  Site of Service: [facility] ED  Patient was seen in-person.     Presenting problem:   Bria is followed related to Boarding Status. Please see initial DEC/[facility] Crisis Assessment completed by Cali Mota  for complete assessment information. Notable concerns include:  According to initial ,   \"Patient is a 43-year-old female who identifies as \"Max\" history of anxiety, major depressive disorder, and psychosis who comes in the hospital brought in by EMS due to concerns of anxiety, depression and suicidal ideation.  Patient reports that they have been feeling this way for the last few weeks and does not feel like himself.  He describes that he is typically energetic, personable and more engaged however lately seems that he cannot \"get my game on\".  Patient reports a personal plan of [place] [address] [place] and reports intent of doing so.\"  Individuals Present: Bria & Yue Patel [facility]    Session start: 3:55 p.m.  Session end: 4:15 p.m.  Session duration in minutes: 20  Session number: 1  Anticipated number of sessions or this episode of care: 3  CPT utilized: 15102 - Psychotherapy (with patient) - 30 (16-37*) min    Current Presentation:   Patient was asleep, but woke up and agreed to meet with this writer.  Patient presented as disheveled, disoriented, and distracted by internal stimuli and sad with a flat affect.  When patient was asked about why she was brought to hospital, she reported that her mother bought her a one week stay at a hotel and she was too lonely and sad.  Today patient denied SI/HI plan/intent and focussed on finding housing. She reported that her voices were better today but still present and that she has not been medication compliant since she lost her meds.     Mental Status Exam:   Appearance: awake, alert, poorly groomed, " somnolent, distracted, mild distress and disheveled   Attitude: guarded  Eye Contact: intense  Mood: sad   Affect: intensity is flat  Speech: pressured speech  Psychomotor Behavior: no evidence of tardive dyskinesia, dystonia, or tics  Thought Process:  disorganized and illogical  Associations: no loose associations  Thought Content: passive suicidal ideation present, auditory hallucinations present, patient appears to be responding to internal stimuli and obsessions present  Insight: limited  Judgement: limited  Oriented to: time, person, and place  Attention Span and Concentration: limited  Recent and Remote Memory: limited    Diagnosis:     298.9 (F29)  Unspecified Schizophrenia Spectrum   300.00 (F41.9) Unspecified Anxiety Disorder - by history   Therapeutic Intervention(s):   Provided active listening, unconditional positive regard, and validation. Engaged in cognitive restructuring/ reframing, looked at common cognitive distortions and challenged negative thoughts. Coached on coping techniques/relaxation skills to help improve distress tolerance and managing intense emotions.    Treatment Objective(s) Addressed:   The focus of this session was on rapport building, orienting the patient to therapy, identifying and practicing coping strategies, identifying an appropriate aftercare plan, identifying treatment goals and identifying additional supports      Progress Towards Goals:   Patient reports stable symptoms. Patient is making progress towards treatment goals as evidenced by improvement in psychosis.     Case Management:   None done.     General Recommendations:   Continue to monitor for harm. Consider: Complete environmental rounding at least 1x/ shift: check for and remove objects which could be use for self/other directed violence, Increase frequency of staff rounding, Use a positive, direct and calm approach. Pt's tend to match the energy/mood of the staff. Keep focus positive and upbeat, Use clear and  concise directions, too many words can be overwhelming, Provide the pt with options to provide a sense of control. Try to tell the pt what they can do instead of what they can't do, Allow family calls/visits, Verbally state expectations , Be firm but gentle when redirecting, Listen in a neutral, non-judgmental way. Offer reassurance and Be mindful of your nonverbal cues (body language, facial expressions)    Plan:     Inpatient mental health for medication management, crisis stabilization and coordination of discharge plans such as a crisis residence. Patient was accompanied  by EMS on 7/6/2023 due to suicidal ideation that has been ongoing for the last 2 weeks with [place] [address].  Patient also reported ongoing difficulties managing auditory hallucinations which the patient was visibly struggling with throughout the interview.  Today, patient continues to endorse psychosis such as auditory hallucinations which seem to be distracting for her so IP tx. At this time seems to be the best option for mitigating this crisis since she has been unsuccessful in lower level settings.    Plan for Care reviewed with Assigned Medical Provider? Yes. Provider, Dr. Valle, response: in agreement.     Yue Patel, [facility]   Licensed Mental Health Professional (LMHP), [facility]  [phone]

## 2023-07-07 NOTE — ED NOTES
Patient continues to sleep. She woke up for a little while took a few bites of her lunch and went back to sleep.

## 2023-07-07 NOTE — PROGRESS NOTES
Triage & Transition Services, Extended Care    Client Name: Bria Breen    Date: July 7, 2023  Service Type:  Group Therapy     Topic:   Long-Term Goals    Intervention:    Group process: support, challenge, affirm, psycho-education.     Response:  Patient did not participate in group d/t sleeping.       Mague Matthews

## 2023-07-08 ENCOUNTER — TELEPHONE (OUTPATIENT)
Dept: BEHAVIORAL HEALTH | Facility: CLINIC | Age: 43
End: 2023-07-08
Payer: COMMERCIAL

## 2023-07-08 ASSESSMENT — COLUMBIA-SUICIDE SEVERITY RATING SCALE - C-SSRS
1. IN YOUR LIFETIME, HAVE YOU WISHED YOU WERE DEAD OR WISHED YOU COULD GO TO SLEEP AND NOT WAKE UP?: Y
1. SINCE LAST CONTACT, HAVE YOU WISHED YOU WERE DEAD OR WISHED YOU COULD GO TO SLEEP AND NOT WAKE UP?: NO
6. HAVE YOU EVER DONE ANYTHING, STARTED TO DO ANYTHING, OR PREPARED TO DO ANYTHING TO END YOUR LIFE?: NO
1. HAVE YOU WISHED YOU WERE DEAD OR WISHED YOU COULD GO TO SLEEP AND NOT WAKE UP?: YES
SUICIDE, SINCE LAST CONTACT: NO
1. SINCE LAST CONTACT, HAVE YOU WISHED YOU WERE DEAD OR WISHED YOU COULD GO TO SLEEP AND NOT WAKE UP?: N
TOTAL  NUMBER OF INTERRUPTED ATTEMPTS SINCE LAST CONTACT: NO
TOTAL  NUMBER OF ABORTED OR SELF INTERRUPTED ATTEMPTS SINCE LAST CONTACT: NO
ATTEMPT SINCE LAST CONTACT: NO
2. HAVE YOU ACTUALLY HAD ANY THOUGHTS OF KILLING YOURSELF?: NO
2. HAVE YOU ACTUALLY HAD ANY THOUGHTS OF KILLING YOURSELF?: N
5. HAVE YOU STARTED TO WORK OUT OR WORKED OUT THE DETAILS OF HOW TO KILL YOURSELF? DO YOU INTEND TO CARRY OUT THIS PLAN?: NO
1. IN THE PAST MONTH, HAVE YOU WISHED YOU WERE DEAD OR WISHED YOU COULD GO TO SLEEP AND NOT WAKE UP?: YES

## 2023-07-08 ASSESSMENT — ACTIVITIES OF DAILY LIVING (ADL)
ADLS_ACUITY_SCORE: 35

## 2023-07-08 NOTE — ED NOTES
Pt discharged to [facility] at 1733. Discharge instructions were reviewed by writer. Pt verbalized  understanding discharge orders. Stated that they will remain safe in the community, will take all his meds as ordered, and that he will follow up with aftercare appointments. Pt denied SI, HI, and hallucinations. Pt took all his personal belongings

## 2023-07-08 NOTE — DISCHARGE INSTRUCTIONS
We have reserved a chapel bed on the male side for you at:  [facility]: [address], [place] [address]  You must arrive by 9:00pm.     Aftercare Plan          If I am feeling unsafe or I am in a crisis, I will:      Contact my established care providers      Call the [place] Suicide Prevention Lifeline: [phone]      Go to the nearest emergency room      Call 911            Warning signs that I or other people might notice when a crisis is developing for me:        I am having increasing suicidal thoughts that turn to plans with intent or means     I am having additional urges to self-harm       My emotions are of hopelessness; feeling like there's no way out.     Rage or anger.     Engaging in risky activities without thinking     Withdrawing from family/friends     Dramatic mood swings     Drastic personality changes      Use of alcohol or drugs     Postings on social media     Neglect of personal hygiene or cares            Things I am able to do on my own to cope or help me feel better:       Things to Try:     Spending quality time with loved ones     Practice playing piano     Staying hydrated     Eating balanced meals     Take care of daily responsibilities/needs     Focus on positive self-talk vs negative self-talk           Things that I am able to do with others to cope or help me better:      Things to Try:     Exercise Playing basketball at the local park     Music     Deep breathing     Meditations     Journal     Self-regulate     Self check-in     Ask for help           Things I can use or do for distraction:      Things to Try:     Reach out to/spend time with family, friends     Shower     Exercise     Chores or do a project     Listen to music     Watch movie/TV     Listening to music     Take my dogs out for a walk           Changes I can make to support my mental health and wellness:       -I will attend scheduled mental health therapy and psychiatric appointments and follow all   "    recommendations     - I will use distraction skills of: going for walks, watching TV, spending time outside, calling a friend or      family member     -Use community resources, including hotline numbers, [facility] crisis and support meetings     -Maintain a daily schedule/routine     -Practice deep breathing skills     -Download a meditation gale and spend 15-20 minutes per day mediating/relaxing. Some apps to      download include: Calm, Headspace and Insight Timer. All 3 of these apps have free version           People in my life that I can ask for help:      Family     Friends               [facility] - [phone]    Your [facility] has a mental health crisis team you can call 24/7: Behavioral Health Emergency Services     The Behavioral Health Emergency Services or Crisis Program provides services to individuals 24 hours a day, 7 days a week through a contract with [facility] by dialing 911 and asking for the on call Behavioral Health worker. These services are offered in homes, police departments, hospitals, schools and other community locations.          Crisis Lines     Crisis Text Line  Text 488546  You will be connected with a trained live crisis counselor to provide support.           Por espanol, texto  MATILDE a 227010 o texto a 442-AYUDAME en WhatsA           The [facility] [employer] Project (LGBTQ Youth Crisis Line)  0.541.818.2061  text START to 122-539                 Community Resources     Fast Tracker  Linking people to mental health and substance use disorder resources  fasttrackermn.org            [place] Mental Health Warm Line  Peer to peer support  Monday thru Saturday, 12 pm to 10 pm  776.869.6048 or 4.577.378.1350  Text \"Support\" to 82875           National [employer] on Mental Illness (MEGHAN)  403.431.2603 or 1.888.MEGHAN.HELPS                      Mental Health Apps     My3  https://my3app.org/           VirtualHopeBox  https://We Cluster.org/apps/virtual-hope-box/       "           Additional Information     Today you were seen by a licensed mental health professional through Triage and Transition services, [facility] Providers ([facility])  for a crisis assessment in the Emergency Department at [facility].  It is recommended that you follow up with your established providers (psychiatrist, mental health therapist, and/or primary care doctor - as relevant) as soon as possible. Coordinators from [facility] will be calling you in the next 24-48 hours to ensure that you have the resources you need.  You can also contact [facility] coordinators directly at [phone]. You may have been scheduled for or offered an appointment with a mental health provider. [facility] maintains an extensive network of licensed behavioral health providers to connect patients with the services they need.  We do not charge providers a fee to participate in our referral network.  We match patients with providers based on a patient's specific needs, insurance coverage, and location.  Our first effort will be to refer you to a provider within your care system, and will utilize providers outside your care system as needed.

## 2023-07-08 NOTE — PROGRESS NOTES
This writer spoke to patient's mother Kia who reported that patient has been struggling and requires more in home services.  She noted that patient used to have the ACT team until she left the [place] to get  and then their case was closed.  Parent recommends that patient be in a crisis facility where people ensure that their psych. Meds.  This writer advised mother that plan is to find a crisis bed today.  VERENICE Quiñonez

## 2023-07-08 NOTE — PROGRESS NOTES
"[facility] Crisis Reassessment      Bria Breen was reassessed at the request of MD for the following reasons: patient's mental health deteriorating and patient was having suicidal ideations with a plan to jump over [address].   Pt was first seen on 7/6/2023 by Cali Pacheco; see the initial assessment note for details.    Patient interview started at: 4:10 and completed at: 4:30 p.m..  Patient was assessed: In Person  Patient location: declocations: [facility]    Patient Presentation- initial presentation:    \"Patient is a 43-year-old female who identifies as \"Max\" history of anxiety, major depressive disorder, and psychosis who comes in the hospital brought in by EMS due to concerns of anxiety, depression and suicidal ideation.  Patient reports that they have been feeling this way for the last few weeks and does not feel like himself.  He describes that he is typically energetic, personable and more engaged however lately seems that he cannot \"get my game on\".  Patient reports a personal plan of wanting [place] [address] [place] and reports intent of doing so.\"    Current patient presentation: Patient denied SI/HI plan/intent - denied current psychosis - requested discharge and seems much more coherent with thoughts and speech since 7/7/2023.  Patient agreed to stay at [facility] in [place].      Changes observed since initial assessment: Much more organized - denied SI/HI plan/intent.  Can engage in safety planning and has the information for safety and crisis resources.      Risk of Harm    [place] Suicide Severity Rating Scale Full Clinical Version:  Suicidal Ideation  1. Wish to be Dead (Lifetime): Yes  Wish to be Dead Description (Lifetime): y  1. Wish to be Dead (Past 1 Month): Yes        C-SSRS Risk (Lifetime/Recent)  Calculated C-SSRS Risk Score (Lifetime/Recent): Low Risk    [place] Suicide Severity Rating Scale Since Last Contact: 7/8/2023  Suicidal Ideation (Since " Last Contact)  1. Wish to be Dead (Since Last Contact): No  Wish to be Dead Description (Since Last Contact): n  2. Non-Specific Active Suicidal Thoughts (Since Last Contact): No  Non-Specific Active Suicidal Thought Description (Since Last Contact): n  3. Active Suicidal Ideation with any Methods (Not Plan) Without Intent to Act (Since Last Contact): No  Active Suicidal Ideation with any Methods (Not Plan) Description (Since Last Contact): n  4. Active Suicidal Ideation with Some Intent to Act, Without Specific Plan (Since Last Contact): No  Active Suicidal Ideation with Some Intent to Act, Without Specific Plan Description (Since Last Contact): n  5. Active Suicidal Ideation with Specific Plan and Intent (Since Last Contact): No  Active Suicidal Ideation with Specific Plan and Intent Description (Since Last Contact): n  Suicidal Behavior (Since Last Contact)  Actual Attempt (Since Last Contact): No  Has subject engaged in non-suicidal self-injurious behavior? (Since Last Contact): No  Interrupted Attempts (Since Last Contact): No  Aborted or Self-Interrupted Attempt (Since Last Contact): No  Preparatory Acts or Behavior (Since Last Contact): No  Suicide (Since Last Contact): No     C-SSRS Risk (Since Last Contact)  Calculated C-SSRS Risk Score (Since Last Contact): No Risk Indicated    Validity of evaluation is not impacted by presenting factors during interview.   Comments regarding subjective versus objective responses to Starr tool: See narrative.  Environmental or Psychosocial Events: loss of relationship due to divorce/separation, bullied/abused, work or task failure, challenging interpersonal relationships, geographic isolation from supports, impulsivity/recklessness, unemployment/underemployment, unstable housing, homelessness, other life stressors, neither working nor attending school and social isolation  Chronic Risk Factors: history of suicide attempts (2012), history of psychiatric hospitalization  and LGBTQ+ orientation    Warning Signs: talking or writing about death, dying, or suicide, hopelessness, acting reckless or engaging in risky activities, dramatic changes in mood, no reason for living, no sense of purpose in life, engaging in self-destructive behavior and recent discharges from emergency department or inpatient psychiatric care  Protective Factors: able to access care without barriers and help seeking  Interpretation of Risk Scoring, Risk Mitigation Interventions and Safety Plan:  No risk identified and patient's crisis has resolved.        Does the patient have thoughts of harming others? No    Mental Status Exam   Affect: Appropriate  Appearance: Disheveled    Attention Span/Concentration: Attentive?    Eye Contact: Variable   Fund of Knowledge: Delayed    Language /Speech Content: Expressive Speech   Language /Speech Volume: Loud    Language /Speech Rate/Productions: Hyperverbal    Recent Memory: Variable   Remote Memory: Variable   Mood: Normal    Orientation to Person: Yes    Orientation to Place: Yes   Orientation to Time of Day: Yes    Orientation to Date: Yes    Situation (Do they understand why they are here?): Yes    Psychomotor Behavior: Hyperactive    Thought Content: Clear   Thought Form: Intact       Additional Collateral Information   This  spoke to patient's mother and indicated that patient will be discharged to a shelter since they do not meet criteria for inpatient mental health treatment and are able to engage in safety planning.      Therapeutic Intervention  The following therapeutic methodologies were employed when working with the patient: Establishing rapport, Active listening, Assess dimensions of crisis, Identify additional supports and alternative coping skills, Establish a discharge plan, Motivational Interviewing, Brief Supportive Therapy, Trauma-Informed Care and Safety planning. Patient response to intervention: receptive..    Diagnosis:      298.9 (F29)   Unspecified Schizophrenia Spectrum   300.00 (F41.9) Unspecified Anxiety Disorder - by history     Clinical Substantiation of Recommendations  Patient made improvement in terms of their mental health symptoms and denied SI/HI plan/intent and admitted that auditory hallucinations have subsided.  Patient not hold able and can engage in safety planning.    Plan:    Disposition  Recommended disposition: Programmatic Care: [facility]  - on [facility] in [place] with [facility] information.        Reviewed case and recommendations with attending provider. Attending Name: Dr. Patrick      Attending concurs with disposition: Yes      Patient and/or verified legal guardian concurs with disposition: Yes      Final disposition: Discharge to shelter and crisis [facility].        Assessment Details  Total duration of face to face contact in minutes: .75 hrs     CPT code(s) utilized: 90651 - Psychotherapy (with patient) - 30 (16-37*) min       Yue Patel, [facility], [facility]  Callback: [phone]

## 2023-07-08 NOTE — TELEPHONE ENCOUNTER
R: [facility] ONLY    [facility] Access Inpatient Bed Call Log 7/8/23 7:08AM   Intake has called facilities that have not updated their bed status within the last 12 hours.                  Adults:                   [facility] is at capacity.                 [facility] is posting 0 beds.    [phone];                [facility] is posting 0 beds. Negative covid required.                    [facility] is posting 0 beds. Negative covid required.  [phone]; Per call at 7:20am nurses are in report to callback.               [facility] is posting 0 beds.                     [facility] is posting 1 bed. Low acuity only                Pt remains on work list pending appropriate bed availability.

## 2023-07-08 NOTE — ED NOTES
Patient is observed to be sleeping. She was up for breakfast. She ate a few bites. She denies pain and all psych symptoms. Refused VS check and medication. Stated she is tired and wanted to sleep.

## 2023-07-08 NOTE — ED NOTES
Patient ran while outside and stated, no don't want to go to [facility].    ED was notified and  notified

## 2023-07-08 NOTE — ED PROVIDER NOTES
[facility] ED Mental Health Handoff Note:       Brief HPI:  This is a 43 year old female signed out to me.  See initial ED Provider note for full details of the presentation. Interval history is pertinent for continued stabilization with no further suicidal ideation.  Patient has also had several interactions that were deemed to be an appropriate with other patients and staff.  Once again on further interview patient denies any intent to harm self and will be discharged from the emergency room.    Home meds reviewed and ordered/administered: Yes    Medically stable for inpatient mental health admission: Yes.    Evaluated by mental health: yes    Safety concerns: At the time I received sign out, Pt asking to hug patients and staff not appropriate behaviors.    Hold Status:  Active Orders   N/A           Exam:   No data found.        ED Course:    Medications   lisinopril (ZESTRIL) tablet 5 mg (5 mg Oral Not Given 7/8/23 0841)   diphenhydrAMINE (BENADRYL) capsule 50 mg (50 mg Oral $Given 7/7/23 2220)   LORazepam (ATIVAN) tablet 1 mg (1 mg Oral $Given 7/6/23 1142)   OLANZapine zydis (zyPREXA) ODT tab 10 mg (10 mg Oral Not Given 7/6/23 1142)   OLANZapine zydis (zyPREXA) ODT tab 10 mg (10 mg Oral $Given 7/6/23 1944)   OLANZapine zydis (zyPREXA) ODT tab 10 mg (10 mg Oral $Given 7/7/23 0430)            There were no significant events during my shift.        Impression:    ICD-10-CM    1. Suicidal ideation  R45.851           Plan:    1. Discharged.      MD Bridget Maurer Eric Girard, MD  07/08/23 6783

## 2023-07-08 NOTE — ED PROVIDER NOTES
Patient telling nursing staff she would like to leave.  Based on initial MD note she will need a reassessment before determining appropriate disposition.  There is a long wait currently for dec assessments. Her reassessment will be placed in the queue.      Cielo Valle MD  07/07/23 8358

## 2023-07-08 NOTE — ED NOTES
Charge nurse reported to RN about patient inappropriate behavior. Patient was noted hugging a minor patient and asking them if they have had sex. Charge nurse and RN pulled patient to the side and talked to to them about the inappropriate behavior. Patient apologized. Patient was asked not go where the minors are. Charge nurse reported situation to CECIL FENG, Nurse manager and nursing supervisor.

## 2023-07-09 NOTE — TELEPHONE ENCOUNTER
R:  [place]  Access Inpatient Bed Call Log 7/8/2023 at 9pm  Intake has called facilities that have not updated their bed status within the last 12 hours.            Adults:        [facility] is at capacity.      [facility] is posting 0 beds.        [facility] is posting 0 beds. Negative covid required.         [facility] is posting 0 beds. Negative covid required.         [facility] are posting 0 beds.            Pt remains on waitlist pending appropriate bed availability.

## 2023-07-09 NOTE — TELEPHONE ENCOUNTER
2:34PM Intake received Teams message from EC Patient was discharged yesterday. Please remove from IP list: LD [id number], was at [facility] ED/[facility].    R:  [place]  Access Inpatient Bed Call Log 7/9/2023 7:17AM ([facility] Only)  Intake has called facilities that have not updated their bed status within the last 12 hours.           [facility] is at capacity.     [facility] is posting 0 beds.    [phone];    [facility] is posting 0 beds. Negative covid required.        [facility] is posting 0 beds. Negative covid required.  [phone]; Per call at 7:32 AM to [facility].   [facility] is posting 0 beds.         [facility] is posting 1 bed. Low acuity only           Patient will remain on work list pending appropriate bed availability    Patient removed from work list Intake no longer following.

## 2023-08-12 ENCOUNTER — VIRTUAL VISIT (OUTPATIENT)
Dept: URGENT CARE | Facility: CLINIC | Age: 43
End: 2023-08-12
Payer: COMMERCIAL

## 2023-08-12 DIAGNOSIS — R05.1 ACUTE COUGH: Primary | ICD-10-CM

## 2023-08-12 DIAGNOSIS — R07.9 CHEST PAIN, UNSPECIFIED TYPE: ICD-10-CM

## 2023-08-12 DIAGNOSIS — R06.00 DYSPNEA, UNSPECIFIED TYPE: ICD-10-CM

## 2023-08-12 PROCEDURE — 99203 OFFICE O/P NEW LOW 30 MIN: CPT | Mod: VID

## 2023-08-12 NOTE — PROGRESS NOTES
"Bria Breen is being evaluated via a billable video visit.      Assessment & Plan:     Prolonged symptoms with concerning CP, SOB, sweats/chills. Currently is speaking in complete sentences, does not appear to be in distress. Advised in person visit at urgent care for exam and consideration of CXR. Pt will go in today.    See patient instructions below.  At the end of the encounter, I discussed results, diagnosis, medications. Discussed red flags for being seen in person at clinic/ER, as well as indications for follow up if no improvement. Patient understood and agreed to plan.       ICD-10-CM    1. Acute cough  R05.1       2. Chest pain, unspecified type  R07.9       3. Dyspnea, unspecified type  R06.00             No follow-ups on file.    Video-Visit Details    Type of service:  Video Visit    Video Start Time: 2:02pm  Video End Time: 2:08pm    Originating Location (pt. Location): Home    Distant Location (provider location):  [employer] VIRTUAL URGENT CARE     Mode of Communication:  Video Conference via LAUREN Ho, MARJORIE  [employer] UNSCHEDULED CARE    Subjective:   Bria Brene is a 43 year old adult who is contacted via telephone thru scheduled urgent care virtual visit to discuss: No chief complaint on file.    Chills, sweats, cough, fatigue, SOB, and chest pain onset 2 months ago. Chest pain is described as just an \"odd sensation, doesn't feel right\". Patient does vape. No treatments tried. Patient reports no fever, headache, abdominal pain, nausea, vomiting, diarrhea, rash, or any other symptoms.     No past medical history on file.    Objective:   Gen:  NAD  Pulm: non-labored work of breathing    No results found for any visits on 08/12/23.    There are no Patient Instructions on file for this visit.  " Left detailed message appt canceled due Dr Santana out of the office appt will be canceled informed to call offcie to reschedule.

## 2023-09-12 ENCOUNTER — HOSPITAL ENCOUNTER (OUTPATIENT)
Facility: CLINIC | Age: 43
Setting detail: OBSERVATION
Discharge: HOME | End: 2023-09-14
Attending: EMERGENCY MEDICINE | Admitting: EMERGENCY MEDICINE
Payer: MEDICAID

## 2023-09-12 DIAGNOSIS — F29 PSYCHOSIS, UNSPECIFIED PSYCHOSIS TYPE (H): ICD-10-CM

## 2023-09-12 DIAGNOSIS — F41.8 DEPRESSION WITH ANXIETY: ICD-10-CM

## 2023-09-12 DIAGNOSIS — R45.851 SUICIDAL IDEATION: Primary | ICD-10-CM

## 2023-09-12 DIAGNOSIS — R44.0 AUDITORY HALLUCINATIONS: ICD-10-CM

## 2023-09-12 PROBLEM — F20.9 SCHIZOPHRENIA (H): Status: ACTIVE | Noted: 2023-09-12

## 2023-09-12 LAB
ATRIAL RATE - MUSE: 76 BPM
DIASTOLIC BLOOD PRESSURE - MUSE: NORMAL MMHG
INTERPRETATION ECG - MUSE: NORMAL
P AXIS - MUSE: 36 DEGREES
PR INTERVAL - MUSE: 150 MS
QRS DURATION - MUSE: 94 MS
QT - MUSE: 390 MS
QTC - MUSE: 438 MS
R AXIS - MUSE: 52 DEGREES
SYSTOLIC BLOOD PRESSURE - MUSE: NORMAL MMHG
T AXIS - MUSE: -38 DEGREES
TROPONIN T SERPL HS-MCNC: <6 NG/L
VENTRICULAR RATE- MUSE: 76 BPM

## 2023-09-12 PROCEDURE — G0378 HOSPITAL OBSERVATION PER HR: HCPCS

## 2023-09-12 PROCEDURE — 84484 ASSAY OF TROPONIN QUANT: CPT | Performed by: EMERGENCY MEDICINE

## 2023-09-12 PROCEDURE — 250N000013 HC RX MED GY IP 250 OP 250 PS 637: Performed by: EMERGENCY MEDICINE

## 2023-09-12 PROCEDURE — 36415 COLL VENOUS BLD VENIPUNCTURE: CPT | Performed by: EMERGENCY MEDICINE

## 2023-09-12 PROCEDURE — 250N000013 HC RX MED GY IP 250 OP 250 PS 637: Performed by: STUDENT IN AN ORGANIZED HEALTH CARE EDUCATION/TRAINING PROGRAM

## 2023-09-12 PROCEDURE — 90791 PSYCH DIAGNOSTIC EVALUATION: CPT

## 2023-09-12 PROCEDURE — 250N000013 HC RX MED GY IP 250 OP 250 PS 637: Performed by: PSYCHIATRY & NEUROLOGY

## 2023-09-12 PROCEDURE — 93005 ELECTROCARDIOGRAM TRACING: CPT

## 2023-09-12 PROCEDURE — 99285 EMERGENCY DEPT VISIT HI MDM: CPT | Mod: 25

## 2023-09-12 PROCEDURE — 99223 1ST HOSP IP/OBS HIGH 75: CPT | Mod: AI | Performed by: STUDENT IN AN ORGANIZED HEALTH CARE EDUCATION/TRAINING PROGRAM

## 2023-09-12 RX ORDER — IBUPROFEN 600 MG/1
600 TABLET, FILM COATED ORAL EVERY 6 HOURS PRN
Status: DISCONTINUED | OUTPATIENT
Start: 2023-09-12 | End: 2023-09-14 | Stop reason: HOSPADM

## 2023-09-12 RX ORDER — TRAZODONE HYDROCHLORIDE 50 MG/1
50 TABLET, FILM COATED ORAL
Status: DISCONTINUED | OUTPATIENT
Start: 2023-09-12 | End: 2023-09-14 | Stop reason: HOSPADM

## 2023-09-12 RX ORDER — LORAZEPAM 0.5 MG/1
1 TABLET ORAL ONCE
Status: COMPLETED | OUTPATIENT
Start: 2023-09-12 | End: 2023-09-12

## 2023-09-12 RX ORDER — QUETIAPINE FUMARATE 100 MG/1
100 TABLET, FILM COATED ORAL 2 TIMES DAILY PRN
Status: DISCONTINUED | OUTPATIENT
Start: 2023-09-12 | End: 2023-09-14 | Stop reason: HOSPADM

## 2023-09-12 RX ORDER — ACETAMINOPHEN 500 MG
1000 TABLET ORAL ONCE
Status: COMPLETED | OUTPATIENT
Start: 2023-09-12 | End: 2023-09-12

## 2023-09-12 RX ORDER — OLANZAPINE 5 MG/1
5-10 TABLET, ORALLY DISINTEGRATING ORAL EVERY 6 HOURS PRN
Status: DISCONTINUED | OUTPATIENT
Start: 2023-09-12 | End: 2023-09-14 | Stop reason: HOSPADM

## 2023-09-12 RX ORDER — ACETAMINOPHEN 325 MG/1
650 TABLET ORAL EVERY 4 HOURS PRN
Status: DISCONTINUED | OUTPATIENT
Start: 2023-09-12 | End: 2023-09-14 | Stop reason: HOSPADM

## 2023-09-12 RX ORDER — QUETIAPINE FUMARATE 25 MG/1
25 TABLET, FILM COATED ORAL 3 TIMES DAILY PRN
Status: DISCONTINUED | OUTPATIENT
Start: 2023-09-12 | End: 2023-09-12

## 2023-09-12 RX ORDER — LANOLIN ALCOHOL/MO/W.PET/CERES
3 CREAM (GRAM) TOPICAL
Status: DISCONTINUED | OUTPATIENT
Start: 2023-09-12 | End: 2023-09-14 | Stop reason: HOSPADM

## 2023-09-12 RX ADMIN — LORAZEPAM 1 MG: 0.5 TABLET ORAL at 08:27

## 2023-09-12 RX ADMIN — QUETIAPINE FUMARATE 100 MG: 100 TABLET ORAL at 21:45

## 2023-09-12 RX ADMIN — TRAZODONE HYDROCHLORIDE 50 MG: 50 TABLET ORAL at 21:45

## 2023-09-12 RX ADMIN — ACETAMINOPHEN 1000 MG: 500 TABLET, FILM COATED ORAL at 08:27

## 2023-09-12 RX ADMIN — OLANZAPINE 5 MG: 5 TABLET, ORALLY DISINTEGRATING ORAL at 13:32

## 2023-09-12 ASSESSMENT — ACTIVITIES OF DAILY LIVING (ADL)
ADLS_ACUITY_SCORE: 35

## 2023-09-12 NOTE — ED TRIAGE NOTES
Pt comes from home via EMS. EMS reports patient has a hx of schizophrenia, not currently taking their medications. Pt reports auditory hallucinations to harm self and others. Pt states they are having suicidal ideation with a plan to kill themselves in a car accident.      Triage Assessment       Row Name 09/12/23 0738       Triage Assessment (Adult)    Airway WDL WDL       Respiratory WDL    Respiratory WDL WDL       Skin Circulation/Temperature WDL    Skin Circulation/Temperature WDL WDL       Cardiac WDL    Cardiac WDL WDL       Peripheral/Neurovascular WDL    Peripheral Neurovascular WDL WDL       Cognitive/Neuro/Behavioral WDL    Cognitive/Neuro/Behavioral WDL WDL
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
## 2023-09-12 NOTE — ED NOTES
Bed: ED19  Expected date:   Expected time:   Means of arrival:   Comments:  Hems 412 43 F suicidal hallucinations voluntary eta 3742

## 2023-09-12 NOTE — ED NOTES
Restless and feeling internally agitated -requesting medication. Order for Zyprexa received and given.
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
## 2023-09-12 NOTE — PROGRESS NOTES
I attempted to meet with the patient however he is sedated, possibly from a dose of zyprexa given earlier today. Respirations are even and unlabored. Plan to reassess when he is awake.

## 2023-09-12 NOTE — ED NOTES
"56 year old \"james\" received to [facility] due to auditory hallucinations telling him to harm himself and others. Depressed. Disorganized. Has not taken his medication for his mental health in over 1 month. Repeatedly tells writer that the ativan given in ER was very helpful and would like that prescribed for him.     Nursing and risk assessments completed.  Assessments reviewed with LMHP and physician. Video monitoring in progress, patient informed.  Admission information reviewed with patient. Patient given a tour of Em[facility] and instructions on using the facility. Questions regarding Em[facility] addressed. Pt search completed . Belongings secured.   "
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
## 2023-09-12 NOTE — ED NOTES
[facility]  ED to EMPATH Checklist:      Goal for EMPATH: Anxiety management, Suicidality, and Hallucinations    Current Behavior: Anxious and Cooperative    Safety Concerns: Auditory Hallucinations, suicidal with a plan to crash car    Legal Hold Status: Voluntary    Medically Cleared by ED provider: Yes    Patient Therapeutically Searched: N/A, voluntary    Belongings: Remain with patient    Independent Ambulation at Baseline: Yes/No: Yes    Participates in Care/Conversation: Yes/No: Yes    Patient Informed about EMPATH: Yes/No: Yes    DEC: Ordered and pending    Patient Ready to be Transferred to EMPATH? Yes/No: Yes

## 2023-09-13 LAB
AMPHETAMINES UR QL SCN: NORMAL
BARBITURATES UR QL SCN: NORMAL
BENZODIAZ UR QL SCN: NORMAL
BZE UR QL SCN: NORMAL
CANNABINOIDS UR QL SCN: NORMAL
FENTANYL UR QL: NORMAL
OPIATES UR QL SCN: NORMAL
PCP QUAL URINE (ROCHE): NORMAL

## 2023-09-13 PROCEDURE — 99232 SBSQ HOSP IP/OBS MODERATE 35: CPT | Performed by: PSYCHIATRY & NEUROLOGY

## 2023-09-13 PROCEDURE — 250N000013 HC RX MED GY IP 250 OP 250 PS 637: Performed by: STUDENT IN AN ORGANIZED HEALTH CARE EDUCATION/TRAINING PROGRAM

## 2023-09-13 PROCEDURE — 250N000013 HC RX MED GY IP 250 OP 250 PS 637: Performed by: PSYCHIATRY & NEUROLOGY

## 2023-09-13 PROCEDURE — G0378 HOSPITAL OBSERVATION PER HR: HCPCS

## 2023-09-13 PROCEDURE — 80307 DRUG TEST PRSMV CHEM ANLYZR: CPT | Performed by: PSYCHIATRY & NEUROLOGY

## 2023-09-13 RX ORDER — VENLAFAXINE HYDROCHLORIDE 37.5 MG/1
37.5 CAPSULE, EXTENDED RELEASE ORAL
Status: DISCONTINUED | OUTPATIENT
Start: 2023-09-14 | End: 2023-09-14 | Stop reason: HOSPADM

## 2023-09-13 RX ORDER — QUETIAPINE 150 MG/1
150 TABLET, FILM COATED, EXTENDED RELEASE ORAL AT BEDTIME
Status: DISCONTINUED | OUTPATIENT
Start: 2023-09-13 | End: 2023-09-14 | Stop reason: HOSPADM

## 2023-09-13 RX ADMIN — QUETIAPINE FUMARATE 150 MG: 150 TABLET, EXTENDED RELEASE ORAL at 21:02

## 2023-09-13 RX ADMIN — TRAZODONE HYDROCHLORIDE 50 MG: 50 TABLET ORAL at 21:02

## 2023-09-13 RX ADMIN — QUETIAPINE FUMARATE 100 MG: 100 TABLET ORAL at 09:35

## 2023-09-13 RX ADMIN — OLANZAPINE 5 MG: 5 TABLET, ORALLY DISINTEGRATING ORAL at 18:51

## 2023-09-13 ASSESSMENT — ACTIVITIES OF DAILY LIVING (ADL)
ADLS_ACUITY_SCORE: 35

## 2023-09-13 NOTE — PROGRESS NOTES
[facility] Progress Note    Client Name: Bria Breen  Date: September 12, 2023  Service Type:  Group Therapy  Facilitator:me@ MICHEAL Zurita        Response:  Patient did not participate in group.      Stephan Turcios
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
## 2023-09-13 NOTE — PROGRESS NOTES
[facility] Progress Note    Client Name: Bria Breen  Date: September 13, 2023  Service Type:  Group Therapy  Facilitator:BEE Berg, [facility]          Response:  Patient did not participate in group.      VERENICE Jimenez
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
## 2023-09-13 NOTE — ED NOTES
Pt. has been resting on and off all evening. Gets up only to request more medication and more food. Declined to provide urine specimen. Declined to engage in conversation.Seen by psychiatric provider-prn medication ordered. Plan to keep on observation tonight in hopes of patient being more open to treatment /assessment tomorrow.

## 2023-09-13 NOTE — PROGRESS NOTES
"Triage & Transition Services [facility]     Progress Note    Patient: Jon goes by \"Jon,\" uses he/him pronouns  Date of Service: September 13, 2023  Site of Service:   Patient was seen in-person.     Presenting problem:  Please see initial [facility] Crisis Assessment completed by Fermin Nur [facility] on yesterday for complete assessment information. Notable concerns include worsening symptoms of psychosis, depression, SI with plan.     Individuals Present: Jon & Liu Lisa [facility]    Session start: 1405  Session end: 1425  Session duration in minutes: 20  CPT utilized: 32213 - Psychotherapy (with patient) - 30 (16-37*) min    Current Presentation:   Pt's affect is quite flat, he reports having no energy or motivation to do anything and feeling quite hopeless.  He continues to want to be dead and has plan to crash his car. He also is cooperative and willing to talk with the psychiatrist about possible med changes and reports some bit of hope that perhaps medication might be helpful.     Additional Collateral Information:  None at this time.     Mental Status Exam     Affect: Flat   Appearance: Disheveled    Attention Span/Concentration: Attentive  Eye Contact: Variable   Fund of Knowledge: Appropriate    Language /Speech Content: Fluent   Language /Speech Volume: Soft    Language /Speech Rate/Productions: Minimally Responsive    Recent Memory: Intact   Remote Memory: Intact   Mood: Depressed    Orientation to Person: Yes    Orientation to Place: Yes   Orientation to Time of Day: Yes    Orientation to Date: Yes    Situation (Do they understand why they are here?): Yes    Psychomotor Behavior: Underactive    Thought Content: Hallucinations and Suicidal   Thought Form: Intact     Diagnosis:      Schizophrenia (H) F20.9       Therapeutic Intervention(s):   Provided active listening, unconditional positive regard, and validation. Engaged in safety planning.  Engaged in cognitive restructuring/ reframing, " looked at common cognitive distortions and challenged negative thoughts. Engaged in guided discovery, explored patient's perspectives and helped expand them through socratic dialogue. Provided positive reinforcement for progress towards goals, gains in knowledge, and application of skills previously taught.     Treatment Objective(s) Addressed:   The focus of this session was on rapport building, identifying and practicing coping strategies, assessing safety, identifying treatment goals, and exploring obstacles to safety in the community.     Progress Towards Goals:   Patient reports stable symptoms. Patient is not making progress towards treatment goals as evidenced by continuing to have thoughts of suicide with intent, ongoing significant auditory hallucinations and symptoms of depression.     Case Management:   None at this time     Plan and Clinical Summary and Substantiation of Recommendations:   Observation: Pt continues to struggle with significant hopelessness, SI with plan and auditory hallucinations.  He does not feel safe returning home at this time and is in agreement with remaining at [facility] to continue working with our treatment team    Attending concurs with disposition: Yes         VERENICE Jimenez

## 2023-09-14 VITALS
HEART RATE: 64 BPM | OXYGEN SATURATION: 98 % | TEMPERATURE: 98.2 F | WEIGHT: 163.1 LBS | RESPIRATION RATE: 18 BRPM | BODY MASS INDEX: 27.84 KG/M2 | DIASTOLIC BLOOD PRESSURE: 76 MMHG | SYSTOLIC BLOOD PRESSURE: 148 MMHG | HEIGHT: 64 IN

## 2023-09-14 PROCEDURE — 250N000013 HC RX MED GY IP 250 OP 250 PS 637: Performed by: PSYCHIATRY & NEUROLOGY

## 2023-09-14 PROCEDURE — G0378 HOSPITAL OBSERVATION PER HR: HCPCS

## 2023-09-14 PROCEDURE — 99239 HOSP IP/OBS DSCHRG MGMT >30: CPT | Performed by: PSYCHIATRY & NEUROLOGY

## 2023-09-14 RX ORDER — VENLAFAXINE HYDROCHLORIDE 75 MG/1
75 CAPSULE, EXTENDED RELEASE ORAL DAILY
Qty: 15 CAPSULE | Refills: 1 | Status: ON HOLD | OUTPATIENT
Start: 2023-09-14 | End: 2023-10-04

## 2023-09-14 RX ORDER — QUETIAPINE FUMARATE 200 MG/1
200 TABLET, FILM COATED ORAL 2 TIMES DAILY PRN
Qty: 15 TABLET | Refills: 0 | Status: ON HOLD | OUTPATIENT
Start: 2023-09-14 | End: 2023-10-04

## 2023-09-14 RX ORDER — QUETIAPINE 300 MG/1
300 TABLET, FILM COATED, EXTENDED RELEASE ORAL AT BEDTIME
Qty: 15 TABLET | Refills: 1 | Status: ON HOLD | OUTPATIENT
Start: 2023-09-14 | End: 2023-10-04

## 2023-09-14 RX ADMIN — VENLAFAXINE HYDROCHLORIDE 37.5 MG: 37.5 CAPSULE, EXTENDED RELEASE ORAL at 09:42

## 2023-09-14 ASSESSMENT — ACTIVITIES OF DAILY LIVING (ADL)
ADLS_ACUITY_SCORE: 35

## 2023-09-14 NOTE — PROGRESS NOTES
"Triage & Transition Services [facility]     Progress Note    Patient: Jon goes by \"Max,\" uses he/him pronouns  Date of Service: September 14, 2023  Site of Service: [facility]  Patient was seen in-person.     Presenting problem:  Please see initial [facility] Crisis Assessment completed by Fermin Nur on 9/12/2023 for complete assessment information. Notable concerns include Worsening psychosocial stress, Suicidal ideation, Significant behavioral change .     Individuals Present: Jon & Jerson Garcia, [facility]    Session start: 1305  Session end: 1321  Session duration in minutes: 15  CPT utilized: 55922 - Psychotherapy (with patient) - 30 (16-37*) min    Current Presentation:   Patient was resting in recliner with blanket over face when writer approached to introduce self and invite to meet in consult room to finalize discharge plans.  Patient was initially tense but eased after learning writer was the  and discharge plans were going to be discussed.     Patient asked writer about his education and some other rapport building questions.  Patient does agree that they are feeling ready for discharge as evidenced by denial of experiencing hallucinations, no SI, no HI.  Their speech was clear with some tangential thoughts and minimally unrelated responses to prompts.  Patient was able to stay engaged and present throughout the conversation.     Patient identifies that they will be returning to their home via bus or cab.  Patient is open to IOP intake appointment, expresses interest in new psychiatry provider and in-person therapy.  Patient does not have insurance on file so we are not able to provide support in this area.  Patient states they will call us back after getting home to find their insurance card.     Additional Collateral Information:  none     Mental Status Exam     Affect: Dramatic   Appearance: Appropriate, Disheveled, and Other: hygiene appears good however patient's haircut is very rough and " patchy     Attention Span/Concentration: Attentive  Eye Contact: Engaged   Fund of Knowledge: Appropriate    Language /Speech Content: Fluent   Language /Speech Volume: Normal    Language /Speech Rate/Productions: Normal    Recent Memory: Variable   Remote Memory: Intact   Mood: Normal    Orientation to Person: Yes    Orientation to Place: Yes   Orientation to Time of Day: Yes    Orientation to Date: Yes    Situation (Do they understand why they are here?): Yes    Psychomotor Behavior: Normal    Thought Content: Clear   Thought Form: Intact, Loose Associations, and Tangential     Diagnosis:    Schizophrenia (H) F20.9       Therapeutic Intervention(s):   Provided active listening, unconditional positive regard, and validation. Engaged in safety planning.  Engaged in guided discovery, explored patient's perspectives and helped expand them through socratic dialogue. Engaged in exposure therapy, having patient look at fears/fear hierarchy and utilize coping skills to face exposures. Reviewed healthy living that supports positive mental health, including looking at sleep hygiene, regular movement, nutrition, and regular socialization. Provided positive reinforcement for progress towards goals, gains in knowledge, and application of skills previously taught.  Engaged in social skills training. Identified and practiced coping skills.    Treatment Objective(s) Addressed:   The focus of this session was on rapport building, orienting the patient to therapy, identifying and practicing coping strategies, safety planning, assessing safety, and identifying treatment goals.     Progress Towards Goals:   Patient reports stable symptoms. Patient is making progress towards treatment goals as evidenced by ability to have coherent conversations, mood instability is decreased, and engaged in appointment planning discussion.     Case Management:   Patient does not have insurance on file and does not have insurance card in belongings.   They are willing to call [facility] with that information after going home to obtain that information.      Plan and Clinical Summary and Substantiation of Recommendations:   Discharge: Patient is requesting discharge and met with psych provider to confirm that this request is appropriate.  Patient does present with more clear thoughts, reports no hallucinations this morning and last night reported decreasing auditory hallucinations.  Patient denies SI, HI.  Patient completed After Care plan with mixed of disorganization and some clarity.  Patient will be cabbed home with medications.     Attending concurs with disposition: Yes         VERENICE Figueroa

## 2023-09-14 NOTE — ED NOTES
"Jon is guarded, does not want to talk much, in the middle of the conversation abruptly walks away.  Reports that she is still hearing voices and the voices are telling her \" that everyone is a bitch\" .  Denies any suicidal ideation.  "

## 2023-09-14 NOTE — ED NOTES
Patient spent majority of evening resting in recliner. Ate 100% of dinner. Endorses some intermittent suicidal thoughts, denies any plan or intent currently. Continues to endorse auditory hallucinations, states they are decreasing in frequency. Denies any command hallucinations. Appears internally occupied at times, with response lag when answering questions, but answers questions logically and appropriately. Pt to be reassessed tomorrow.

## 2023-09-14 NOTE — ED NOTES
"[facility] Group Progress Note    Client Name: Bria Breen  Date: September 14, 2023  Service Type:  Group Therapy  Session Start Time:  10:05am  Session End Time: 10:20am  Session Length: 15 minutes  Attendees: Patient and other group members  Facilitator: LAURA Ibrahim     Topic:   Morning Goals Group     Intervention:    Group process: support, challenge, affirm, psycho-education.     Response:  Patient did participate in group. Behavior in group was appropriate though minimal. Patient came to group, shared they go by\"Max\", said they wanted to meet with psychiatry, then got up and exited .  Went back to their assigned recliner and was observed to be resting/sleeping.         LAURA Ibrahim       "

## 2023-09-14 NOTE — PROGRESS NOTES
Patient had a pair of julio hedge christine and scissors on her belongings, she did not want to take them home.

## 2023-09-14 NOTE — ED NOTES
Reviewed discharge information with patient, denies any suicidal ideation and wants to go home.  Medications given to patient and education given about dosages.

## 2023-09-14 NOTE — DISCHARGE INSTRUCTIONS
"  You do not have insurance on file with us so we are unable to schedule any outpatient appointments for IOP, psychiatry, and therapy.  Please call [facility] at [phone] when you are able to provide details about insurance coverage.     Please call the [facility] Financial Team, [phone], to confirm when your insurance needs to be renewed.     Aftercare Plan  If I am feeling unsafe or I am in a crisis, I will:   Contact my established care providers   Call the National Suicide Prevention Lifeline: 988  Go to the nearest emergency room   Call 007     Warning signs that I or other people might notice when a crisis is developing for me: no interest in working    Things I am able to do on my own to cope or help me feel better: make small goals, future me will be happy     Things that I am able to do with others to cope or help me better: TikTok, eat, clean     Things I can use or do for distraction: can't money     Changes I can make to support my mental health and wellness: therapy, psychiatry, take my meds     People in my life that I can ask for help: verona Jauregui Brene     [facility] has a mental health crisis team you can call 24/7: [facility] Mobile Crisis  [phone]     Other things that are important when I'm in crisis: helping others     Crisis Lines  Crisis Text Line  Text 700286  You will be connected with a trained live crisis counselor to provide support.  Por monie, texto  MATILDE a 043158 o texto a 442-AYUDAME en WhatsApp  The [employer] Project (LGBTQ Youth Crisis Line)  6.367.354.3802  text START to 734-097    Community Resources  Fast Tracker  Linking people to mental health and substance use disorder resources  fasttrackermn.org   [place] Mental Health Warm Line  Peer to peer support  Monday thru Saturday, 12 pm to 10 pm  722.653.2535 or 8.885.015.5094  Text \"Support\" to 23038  National [place] [employer] Mental [employer]ness (MEGHAN)  237.907.0912 or 1.888.MEGHAN.HELPS    Mental Health " Apps  My3  https://appbackrpp.org/  VirtualHopeBox  https://[employer]/apps/virtual-hope-box/    Additional Information  Today you were seen by a licensed mental health professional through Triage and Transition services, Behavioral Healthcare Providers ([facility])  for a crisis assessment in the Emergency Department at [facility].  It is recommended that you follow up with your established providers (psychiatrist, mental health therapist, and/or primary care doctor - as relevant) as soon as possible. Coordinators from [facility] will be calling you in the next 24-48 hours to ensure that you have the resources you need.  You can also contact [facility] coordinators directly at [phone]. You may have been scheduled for or offered an appointment with a mental health provider. [facility] maintains an extensive network of licensed behavioral health providers to connect patients with the services they need.  We do not charge providers a fee to participate in our referral network.  We match patients with providers based on a patient's specific needs, insurance coverage, and location.  Our first effort will be to refer you to a provider within your care system, and will utilize providers outside your care system as needed.

## 2023-09-14 NOTE — ED PROVIDER NOTES
"[facility] Unit - Psychiatric Observation Discharge Summary  [facility] Emergency Department  Discharge Date: 9/14/2023    Bria rBeen MRN: [id number]   Age: 43 year old YOB: 1980     Brief HPI & Initial ED Course     Chief Complaint   Patient presents with    Psychiatric Evaluation     HPI  Bria Breen is a 43 year old female with history notable for psychosis and mood lability further complicated by substance usage.  He is currently under observation status on the [facility] unit, now nearing 54 hours in the emergency department.  Overnight, there were no acute issues.  On reassessment today, the patient interprets that his medication doses are too low and is requesting higher dosing.  He identified ongoing mood lability and anxiety as being reasons to optimize dosing.  He recalls that when he felt most stable, he was taking a combination of Effexor 300 mg, Klonopin 4 times a day, and Seroquel 300 mg.  He is not experiencing any side effects to his current medications.  Sleep was fair last night however not optimal.  Appetite is improving.  He denied suicidal and homicidal thoughts.  Auditory hallucinations are present however decreasing in intensity.  No command hallucinations reported.  As we discussed disposition options, he states that he can return to his apartment however would like a referral for partial hospitalization.        Physical Examination   BP: (!) 150/81  Pulse: 68  Temp: 98.2  F (36.8  C)  Resp: 18  Height: 162.6 cm (5' 4\")  Weight: 74 kg (163 lb 1.6 oz)  SpO2: 98 %    Physical Exam  General: Appears stated age.   Neuro: Alert and fully oriented. Extremities appear to demonstrate normal strength on visual inspection.   Integumentary/Skin: no rash visualized, normal color    Psychiatric Examination   Appearance: awake, alert  Attitude:  cooperative  Eye Contact:  fair  Mood:  anxious  Affect:  appropriate and in normal range  Speech:  clear, coherent  Psychomotor Behavior:  no evidence " of tardive dyskinesia, dystonia, or tics  Thought Process:  logical and linear  Associations:  no loose associations  Thought Content:  no evidence of suicidal ideation or homicidal ideation and auditory hallucinations present  Insight:  fair  Judgement:  fair  Oriented to:  time, person, and place  Attention Span and Concentration:  fair  Recent and Remote Memory:  fair  Language: able to name/identify objects without impairment  Fund of Knowledge: intact with awareness of current and past events    Results        Labs Ordered and Resulted from Time of ED Arrival to Time of ED Departure   TROPONIN T, HIGH SENSITIVITY - Normal       Result Value    Troponin T, High Sensitivity <6     DRUG ABUSE SCREEN QUAL URINE - Normal    Amphetamines Urine Screen Negative      Barbituates Urine Screen Negative      Benzodiazepine Urine Screen Negative      Cannabinoids Urine Screen Negative      Cocaine Urine Screen Negative      Fentanyl Qual Urine Screen Negative      Opiates Urine Screen Negative      PCP Urine Screen Negative         Observation Course   The patient was found to have a psychiatric condition that would benefit from an observation stay in the emergency department for further psychiatric stabilization and/or coordination of a safe disposition. The plan upon observation admission included serial assessments of psychiatric condition, potential administration of medications if indicated, further disposition pending the patient's psychiatric course during the monitoring period.     Serial assessments of the patient's psychiatric condition were performed. Nursing notes were reviewed. During the observation period, the patient did not require medications for agitation, and did not require restraints/seclusion for patient and/or provider safety.     After a period of working with the treatment team on the [facility] unit, the patient's mental state improved to allow a safe transition to outpatient care. After counseling on  the diagnosis, work-up, and treatment plan, the patient was discharged. Close follow-up with a psychiatrist and/or therapist was recommended and community psychiatric resources were provided. Patient is to return to the ED if any urgent or potentially life-threatening concerns.     Discharge Diagnoses:   Final diagnoses:   Suicidal ideation   Depression with anxiety   Auditory hallucinations   Psychosis, unspecified psychosis type (H) - rule out schizophrenia vs substance related.        Treatment Plan:  -Increase Seroquel XR to 300 mg nightly for additional mood stabilization and reduction of psychosis  -Increase immediate release Seroquel to 200 mg twice a day as needed for insomnia or severe anxiety  -Increase Effexor from 37.5 mg to 75 mg daily for mood and anxiety management  -Referral for outpatient psychiatric medication management and psychotherapy  -The patient declined a referral to a crisis facility  -Discharge home today.      At the time of discharge, the patient's acute suicide risk was determined to be low due to the following factors: Reduction in the intensity of mood/anxiety symptoms that preceded the admission, denial of suicidal thoughts, denies feeling helpless or helpless, not currently under the influence of alcohol or illicit substances, denies experiencing command hallucinations, no immediate access to firearms. The patient's acute risk could be higher if noncompliant with their treatment plan, medications, follow-up appointments or using illicit substances or alcohol. Protective factors include: social supports    I spent more than 30 minutes on discharge day activities.    --  Gino Stuart MD  [facility] EMERGENCY DEPT  Em[facility] Unit       Gino Stuart MD  09/14/23 [id number]

## 2023-09-18 ENCOUNTER — PATIENT OUTREACH (OUTPATIENT)
Dept: CARE COORDINATION | Facility: CLINIC | Age: 43
End: 2023-09-18
Payer: COMMERCIAL

## 2023-09-18 NOTE — PROGRESS NOTES
Clinic Care Coordination Contact  [facility]: Post-Discharge Note  SITUATION                                                      Admission:    Admission Date: 09/12/23   Reason for Admission: Suicidal ideation    Depression with anxiety    Auditory hallucinations    Psychosis, unspecified psychosis type (H)  Discharge:   Discharge Date: 09/14/23  Discharge Diagnosis: Suicidal ideation    Depression with anxiety    Auditory hallucinations    Psychosis, unspecified psychosis type (H)    BACKGROUND                                                      Per hospital discharge summary and inpatient provider notes:  The patient was found to have a psychiatric condition that would benefit from an observation stay in the emergency department for further psychiatric stabilization and/or coordination of a safe disposition. The plan upon observation admission included serial assessments of psychiatric condition, potential administration of medications if indicated, further disposition pending the patient's psychiatric course during the monitoring period.      Serial assessments of the patient's psychiatric condition were performed. Nursing notes were reviewed. During the observation period, the patient did not require medications for agitation, and did not require restraints/seclusion for patient and/or provider safety.      After a period of working with the treatment team on the [facility] unit, the patient's mental state improved to allow a safe transition to outpatient care. After counseling on the diagnosis, work-up, and treatment plan, the patient was discharged. Close follow-up with a psychiatrist and/or therapist was recommended and community psychiatric resources were provided. Patient is to return to the ED if any urgent or potentially life-threatening concerns.      Discharge Diagnoses:   Final diagnoses:   Suicidal ideation   Depression with anxiety   Auditory hallucinations   Psychosis, unspecified psychosis  type (H) - rule out schizophrenia vs substance related.          Treatment Plan:  -Increase Seroquel XR to 300 mg nightly for additional mood stabilization and reduction of psychosis  -Increase immediate release Seroquel to 200 mg twice a day as needed for insomnia or severe anxiety  -Increase Effexor from 37.5 mg to 75 mg daily for mood and anxiety management  -Referral for outpatient psychiatric medication management and psychotherapy  -The patient declined a referral to a crisis facility  -Discharge home today.        At the time of discharge, the patient's acute suicide risk was determined to be low due to the following factors: Reduction in the intensity of mood/anxiety symptoms that preceded the admission, denial of suicidal thoughts, denies feeling helpless or helpless, not currently under the influence of alcohol or illicit substances, denies experiencing command hallucinations, no immediate access to firearms. The patient's acute risk could be higher if noncompliant with their treatment plan, medications, follow-up appointments or using illicit substances or alcohol. Protective factors include: social supports    ASSESSMENT           Discharge Assessment  How are you doing now that you are home?: Pt reports they are okay. No questions at this time.  How are your symptoms? (Red Flag symptoms escalate to triage hotline per guidelines): Improved  Do you feel your condition is stable enough to be safe at home until your provider visit?: Yes  Does the patient have their discharge instructions? : Yes  Does the patient have questions regarding their discharge instructions? : No  Were you started on any new medications or were there changes to any of your previous medications? : Yes  Does the patient have all of their medications?: Yes  Do you have questions regarding any of your medications? : No  Do you have all of your needed medical supplies or equipment (DME)?  (i.e. oxygen tank, CPAP, cane, etc.):  Yes  Discharge follow-up appointment scheduled within 14 calendar days? : No (Pt will schedule as needed)      PLAN                                                      Outpatient Plan:  It is recommended that you  follow up with your established providers (psychiatrist, mental health therapist, and/or primary care doctor - as relevant) as  soon as possible. Coordinators from [facility] will be calling you in the next 24-48 hours to ensure that you have the resources that you need.    No future appointments.      For any urgent concerns, please contact our 24 hour nurse triage line: [phone] ([phone])         PETR Clark  Connected Care Resource Center  [facility]     *Connected Care Resource Team does NOT follow patient ongoing. Referrals are identified based on internal discharge reports and the outreach is to ensure patient has an understanding of their discharge instructions.

## 2023-09-22 ENCOUNTER — HOSPITAL ENCOUNTER (EMERGENCY)
Facility: CLINIC | Age: 43
Discharge: OTHER INSTITUTION | End: 2023-09-27
Attending: PHYSICIAN ASSISTANT | Admitting: PHYSICIAN ASSISTANT
Payer: MEDICAID

## 2023-09-22 ENCOUNTER — TELEPHONE (OUTPATIENT)
Dept: BEHAVIORAL HEALTH | Facility: CLINIC | Age: 43
End: 2023-09-22
Payer: COMMERCIAL

## 2023-09-22 DIAGNOSIS — F41.8 DEPRESSION WITH ANXIETY: ICD-10-CM

## 2023-09-22 DIAGNOSIS — F29 PSYCHOSIS, UNSPECIFIED PSYCHOSIS TYPE (H): ICD-10-CM

## 2023-09-22 PROCEDURE — 99285 EMERGENCY DEPT VISIT HI MDM: CPT

## 2023-09-22 PROCEDURE — 250N000013 HC RX MED GY IP 250 OP 250 PS 637: Performed by: PHYSICIAN ASSISTANT

## 2023-09-22 RX ORDER — OLANZAPINE 5 MG/1
10 TABLET, ORALLY DISINTEGRATING ORAL 2 TIMES DAILY PRN
Status: DISCONTINUED | OUTPATIENT
Start: 2023-09-22 | End: 2023-09-27 | Stop reason: HOSPADM

## 2023-09-22 RX ORDER — LORAZEPAM 1 MG/1
1 TABLET ORAL EVERY 8 HOURS PRN
Status: DISCONTINUED | OUTPATIENT
Start: 2023-09-22 | End: 2023-09-27 | Stop reason: HOSPADM

## 2023-09-22 RX ORDER — OLANZAPINE 5 MG/1
5 TABLET, ORALLY DISINTEGRATING ORAL ONCE
Status: COMPLETED | OUTPATIENT
Start: 2023-09-22 | End: 2023-09-22

## 2023-09-22 RX ORDER — ACETAMINOPHEN 325 MG/1
650 TABLET ORAL EVERY 4 HOURS PRN
Status: DISCONTINUED | OUTPATIENT
Start: 2023-09-22 | End: 2023-09-27 | Stop reason: HOSPADM

## 2023-09-22 RX ORDER — IBUPROFEN 600 MG/1
600 TABLET, FILM COATED ORAL EVERY 6 HOURS PRN
Status: DISCONTINUED | OUTPATIENT
Start: 2023-09-22 | End: 2023-09-27 | Stop reason: HOSPADM

## 2023-09-22 RX ORDER — DEXTROAMPHETAMINE SACCHARATE, AMPHETAMINE ASPARTATE MONOHYDRATE, DEXTROAMPHETAMINE SULFATE AND AMPHETAMINE SULFATE 3.75; 3.75; 3.75; 3.75 MG/1; MG/1; MG/1; MG/1
15 CAPSULE, EXTENDED RELEASE ORAL EVERY MORNING
Status: ON HOLD | COMMUNITY
Start: 2023-08-11 | End: 2023-10-04

## 2023-09-22 RX ADMIN — OLANZAPINE 5 MG: 5 TABLET, ORALLY DISINTEGRATING ORAL at 14:16

## 2023-09-22 ASSESSMENT — ACTIVITIES OF DAILY LIVING (ADL)
ADLS_ACUITY_SCORE: 35

## 2023-09-22 NOTE — TELEPHONE ENCOUNTER
S: [facility] ED , DEC  [place]  calling at 5:10pm about a 43 year old/Female presenting with Disregulated, AH & VH, not taking Meds, and SI w/plan and intent.       B: Pt arrived via EMS. Presenting problem, stressors: AH -Pt reports voices are demeaning, and VH - of seeing Colors.  Pt has not been taking her meds for 1  1/2 years, but does state been self medicating w/Alcohol and recreational drugs (Meth)  Pt is endorsing SI w/Plan and intent to jump from a bridge.      Pt affect in ED: Disorganized  Pt Dx: Major Depressive Disorder, Generalized Anxiety Disorder, Schizoaffective Disorder, and ADHD  Previous [facility] hx? Yes: 2011,  But several ED Visits- but not admitted  Pt endorses SI with a plan to plan and intent to jump off a bridge    Hx of suicide attempt? Yes: 12 years ago  Pt denies SIB  Pt denies HI   Pt endorses auditory hallucinations  and endorses visual hallucination .   Pt RARS Score: 4    Hx of aggression/violence, sexual offenses, legal concerns, Epic care plan? describe: None    Current concerns for aggression this visit? No  Does pt have a history of Civil Commitment? No  Is Pt their own guardian? Yes    Pt is prescribed medication. Is patient medication compliant? No  Pt endorses OP services:  and PHP -  has not had therapist in over 5 years  CD concerns: Actively using/consuming Meth  Acute or chronic medical concerns: None  Does Pt present with specific needs, assistive devices, or exclusionary criteria? None      Pt is ambulatory  Pt is able to perform ADLs independently      A: Pt to be reviewed for [facility] admission. Pt is Voluntary  Preferred placement: [facility]    COVID Symptoms: No  If yes, COVID test required   Utox: Ordered, not yet collected   CMP: N/A  CBC: N/A  HCG: Ordered, not yet collected    R: Patient cleared and ready for behavioral bed placement: Yes  Pt placed on IP worklist? Yes    Does Patient need a Transfer Center request created? Yes, writer completed Transfer  Center request at:   5:38pm
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
## 2023-09-22 NOTE — ED NOTES
"Pt states she is hearing voices that tell her to steal things and do things that she \"doesn't really want to do.\" States the voices say \"demeaning things\" but do not tell her to hurt herself or others. Says she normally takes klonopin for anxiety and that has helped in the past, says she stopped taking these approx. 1.5 years ago. Pt endorses alcohol use and substance use.  "

## 2023-09-22 NOTE — ED TRIAGE NOTES
Pt BIBA after PD was called d/t pt stealing and exhibiting abnormal behaviors. Hx schizophrenia and anxiety. Voluntary to come to ED but not answering questions for EMS. Denies drug use but says she got out of rehab yesterday.

## 2023-09-22 NOTE — ED PROVIDER NOTES
History     Chief Complaint:  Psychiatric Evaluation       HPI   Bria Breen is a 43 year old female with history of Schizophrenia, anxiety, depression, and suicide attempt who presents by EMS for mental health evaluation.  The patient was picked up by PD for shoplifting and PD noted her to be acting erratically and responding to internal stimuli prompting transport to the emergency department.  Here the patient endorses hearing voices in her head.  Her voice are very demeaning but do not tell her to commit suicide. Patient stopped taking her psychiatric medications a year and a half ago and notes that they used to help reduce her voices. She has been taking alcohol and recreational drugs for her voice recently but would like to restart her prescription medications and be seen by inpatient for mental health.  Denies any pain or taking any other prescription medications no fevers headache or neck stiffness. No plan of self harm, denies taking prescription medications or overdose.    Independent Historian:   EMS also provides above history of shoplifting.    Review of External Notes:   Reviewed [facility] empath note from Dr. Larson-where patient was seen on 9/12/2023 and discharged on 9/14/2023 for suicidal ideation.      Medications:    Benadryl   Epinephrine  Zestril   Zyprexa   Seroquel    Effexor   Klonopin    Past Medical History:    Anxiety  Depression   Suicide attempt  Hyperlipidemia   OCD  Gallstones   Migraine  Schizoaffective disorder   Eating disorder  Substance use disorder  Psychosis     Past Surgical History:    Rhinoplasty      Physical Exam   Patient Vitals for the past 24 hrs:   BP Temp Temp src Pulse Resp SpO2   09/22/23 1613 -- 97.5  F (36.4  C) Temporal -- -- --   09/22/23 1500 122/81 -- -- 81 18 99 %   09/22/23 1408 117/75 -- -- 83 20 97 %        Physical Exam  General: Alert but very anxious appearing.  Pacing around room and hallway muttering.  Head:  Scalp is  NC/AT  Eyes:  No scleral icterus, PERRL with normal tracking.  Not dilated or pinpoint.  ENT:  The external nose and ears are normal.   Neck:  Normal range of motion without rigidity.  Cardio: RRR, No M/R/G  Pulmonary: Lungs CTAB  Abdominal: Soft, No ttp or distension  Skin:  Exposed skin is warm and dry, No rash or lesions noted.  Neuro:  No gross motor deficits. Speech coherent and not slurred.    No facial asymmetry.  GCS: 15.   Psych: Awake.  Anxious.  Tangential.  Appears to be responding to internal stimuli.  Difficulty answering questions directly.    Emergency Department Course   Laboratory:  Urine hcg: Pending  Urine drugs of abuse: pending     Emergency Department Course & Assessments:  Interventions:  Medications   OLANZapine zydis (zyPREXA) ODT tab 5 mg (5 mg Oral $Given 23 6679)        Assessments:  1400 I obtained history and examined the patient as noted above.   Staffed with Dr. Lucas and placed on JENNI by him.  Independent Interpretation (X-rays, CTs, rhythm strip):  None    Consultations/Discussion of Management or Tests:  Discussed with Roshan WILDER who recommends inpatient psychiatric admission and stabilization.       Social Determinants of Health affecting care:   Healthcare Access/Compliance    Disposition:  The patient will board in the emergency department pending bed placement. Care was signed out to Dr. Lucas pending transfer for  admission.     Impression & Plan      Medical Decision Makin-year-old female with a history of schizophrenia, hallucinations, psychosis who presents by EMS after PD was called and the patient was found to be shoplifting and acting very bizarrely.  On exam appears disorganized tangential with internal voices and acutely psychotic decompensated.  Patient is cooperative and agreeable to getting help and restarting her medications.  She was given a dose of Zyprexa here in the ER.  Discussed with DEC who is in agreement with inpatient admission patient  will board in the ED pending availability of mental health bed  They are otherwise well appearing with unremarkable vitals and no indication for further medical workup at this time.  The patient is medically cleared at this time with no evidence of cardiopulmonary, metabolic, neurologic, infectious, or toxicologic emergency.  Staffed with and signed out to Dr. Lucas.      Diagnosis:    ICD-10-CM    1. Psychosis, unspecified psychosis type (H)  F29       2. Depression with anxiety  F41.8            Discharge Medications:  New Prescriptions    No medications on file          Scribe Disclosure:  Luc RIBERA, am serving as a scribe at 2:11 PM on 9/22/2023 to document services personally performed by Murphy Siegel PA-C based on my observations and the provider's statements to me.   9/22/2023   Murphy Siegel PA-C Etten, Clark Ellsworth, PA-C  09/22/23 0484

## 2023-09-23 ENCOUNTER — TELEPHONE (OUTPATIENT)
Dept: BEHAVIORAL HEALTH | Facility: CLINIC | Age: 43
End: 2023-09-23
Payer: COMMERCIAL

## 2023-09-23 PROCEDURE — 250N000013 HC RX MED GY IP 250 OP 250 PS 637: Performed by: EMERGENCY MEDICINE

## 2023-09-23 RX ORDER — QUETIAPINE FUMARATE 200 MG/1
200 TABLET, FILM COATED ORAL 2 TIMES DAILY
Status: DISCONTINUED | OUTPATIENT
Start: 2023-09-23 | End: 2023-09-23

## 2023-09-23 RX ORDER — VENLAFAXINE 75 MG/1
75 TABLET ORAL DAILY
Status: DISCONTINUED | OUTPATIENT
Start: 2023-09-23 | End: 2023-09-23

## 2023-09-23 RX ORDER — QUETIAPINE FUMARATE 200 MG/1
200 TABLET, FILM COATED ORAL 2 TIMES DAILY PRN
Status: DISCONTINUED | OUTPATIENT
Start: 2023-09-23 | End: 2023-09-27 | Stop reason: HOSPADM

## 2023-09-23 RX ORDER — QUETIAPINE FUMARATE 200 MG/1
200 TABLET, FILM COATED ORAL 2 TIMES DAILY PRN
Status: DISCONTINUED | OUTPATIENT
Start: 2023-09-23 | End: 2023-09-23

## 2023-09-23 RX ORDER — QUETIAPINE 300 MG/1
300 TABLET, FILM COATED, EXTENDED RELEASE ORAL AT BEDTIME
Status: DISCONTINUED | OUTPATIENT
Start: 2023-09-23 | End: 2023-09-27 | Stop reason: HOSPADM

## 2023-09-23 RX ORDER — VENLAFAXINE HYDROCHLORIDE 75 MG/1
75 CAPSULE, EXTENDED RELEASE ORAL
Status: DISCONTINUED | OUTPATIENT
Start: 2023-09-23 | End: 2023-09-27 | Stop reason: HOSPADM

## 2023-09-23 RX ORDER — DEXTROAMPHETAMINE SACCHARATE, AMPHETAMINE ASPARTATE MONOHYDRATE, DEXTROAMPHETAMINE SULFATE AND AMPHETAMINE SULFATE 3.75; 3.75; 3.75; 3.75 MG/1; MG/1; MG/1; MG/1
15 CAPSULE, EXTENDED RELEASE ORAL DAILY
Status: DISCONTINUED | OUTPATIENT
Start: 2023-09-23 | End: 2023-09-27

## 2023-09-23 RX ADMIN — DEXTROAMPHETAMINE SACCHARATE, AMPHETAMINE ASPARTATE MONOHYDRATE, DEXTROAMPHETAMINE SULFATE, AND AMPHETAMINE SULFATE 15 MG: 3.75; 3.75; 3.75; 3.75 CAPSULE, EXTENDED RELEASE ORAL at 08:38

## 2023-09-23 RX ADMIN — QUETIAPINE FUMARATE 200 MG: 200 TABLET ORAL at 08:38

## 2023-09-23 RX ADMIN — LORAZEPAM 1 MG: 1 TABLET ORAL at 17:14

## 2023-09-23 RX ADMIN — VENLAFAXINE HYDROCHLORIDE 75 MG: 75 CAPSULE, EXTENDED RELEASE ORAL at 08:38

## 2023-09-23 ASSESSMENT — ACTIVITIES OF DAILY LIVING (ADL)
ADLS_ACUITY_SCORE: 35

## 2023-09-23 NOTE — ED PROVIDER NOTES
"Emergency Department Attending Supervision Note  9/22/2023  7:05 PM      I evaluated this patient in conjunction with Murphy Siegel PA-C      43-year-old female presents to the ED with abnormal behavior.  Patient was found shoplifting by PD.  They noted that she was acting her erratically and responding to internal stimuli.  This prompted transfer to the ED.  Patient admits to auditory hallucinations.  There are no command hallucinations.  She denies suicidal ideation.  She has \"thoughts about hurting other people\" but denies that there is a certain individual that she is thinking about harming.  She denies alcohol or illicit drug use.      On my exam,     HEENT:    Oropharynx is moist  Eyes:    Conjunctiva normal, PERRL  Neck:    Supple, no meningismus.     CV:     Regular rate and rhythm.      No murmurs, rubs or gallops.    PULM:    Clear to auscultation bilateral.       No respiratory distress.      Good air exchange.  ABD:    Soft, non-tender, non-distended.       No rebound, guarding or rigidity.  MSK:     No gross deformity to all four extremities.   LYMPH:   No cervical lymphadenopathy.  NEURO:   Alert and oriented x 3.      Speech is clear with no aphasia.     Gait stable     Normal muscular tone, no tremor.  Skin:    Warm, dry and intact.    Psych:    Mood is good affect is appropriate and congruent.     Disorganized thinking     Positive hallucinations      Patient's evaluation is consistent with decompensated schizophrenia.  She was agreeable to oral Zyprexa.  She was evaluated by DEC who agrees with inpatient psychiatric treatment.  Patient placed on JENNI hold.  Patient changed over to oncoming ED provider pending inpatient psychiatric bed.      Diagnosis    (F29) Psychosis, unspecified psychosis type (H)      Demar Sewell MD, MD  09/22/23 1908    "

## 2023-09-23 NOTE — TELEPHONE ENCOUNTER
No appropriate beds are currently available within the  system. Bed search update ([facility]) @ 12AM:       [facility]: @ cap per website. Per Zully @ 00:01, they are full   [facility]: @ cap per website  [facility]: @ cap per website. Per Fiorella @ 00:01 they don't have anything tonight but suggests calling back after 9AM  [facility]: @ cap per website  [facility]: @ cap per website  [facility]: @ cap per website  [facility]: @ cap per website  [facility]: @ cap per website  [facility]: @ cap per website    Pt remains on work list until appropriate placement is available

## 2023-09-23 NOTE — TELEPHONE ENCOUNTER
R:  No beds within [facility]  Bed search initiated @ 11am -     [facility] is at capacity.              [facility] is posting 0 beds. [phone].    [facility] is posting 0 beds. Negative covid required.                 [facility] is posting 0 bed. Negative covid required. [phone]. Per call         @7:30a to [facility] -  0 Beds  [facility] is posting 0 beds.        [facility] is posting 0 beds. [phone].  Per Jany, no beds available.   [facility] is posting 0 beds           [facility] is posting 0 beds.   Pt  remains on worklist pending bed availability

## 2023-09-23 NOTE — ED PROVIDER NOTES
Sign Out Note     Pt accepted in sign out from: Dr. Bowen    Briefly pt presented to the ED for: Briefly, 43-year-old female with history of schizophrenia, presenting after decompensating.  Currently on a 72-hour hold.     Plan at time of sign out: Await placement     Care of patient during my shift: No issues, patient was cooperative and sleeping.     Plan for patient at this time: Care of patient will be signed out to the oncoming physician, Dr. Jefferson.     MD Chelsi Mendez, Shalini Rossi MD  09/23/23 7839

## 2023-09-23 NOTE — ED NOTES
"Pt became upset with security bc pt  thought security used wrong pronouns. Writer present to help clarify as earlier in day pt had she/her preferred pronouns. At this time pt would like staff to use he/him pronouns and \"Bria\" for preferred name. Pt was able to be verbally de-escalated and reassured that staff are happy to respect pt preference on name and pronouns.  "

## 2023-09-23 NOTE — TELEPHONE ENCOUNTER
R: [place]  Access Inpatient Bed Call Log 9/22/23 5:05 PM    Intake has called facilities that have not updated the bed status within the last 12 hours.                      [facility] is at capacity.              [facility] is posting 0 beds. [phone].  Per call @5:14pm to [facility] no beds available.   [facility] is posting 0 beds. Negative covid required.                 [facility] is posting 0 bed. Negative covid required. [phone]. Per call @5:15pm to [facility], low acuity bed available.    [facility] is posting 0 beds.        [facility] is posting 0 beds. [phone].  Per Jany, no beds available.   [facility] is posting 0 beds           [facility] ([facility] is posting 0 beds.       Pt remains on the work list pending appropriate bed availability

## 2023-09-23 NOTE — ED NOTES
Patient on call light requesting isabelle and boxed sandwich. Patient given these items, and dinner meal tray order placed. Patient declines any needs at this time. Patient pleasant and cooperative with staff, demonstrating appropriate behavior.

## 2023-09-23 NOTE — ED NOTES
Pt mother Kia [phone] called earlier requesting pts keys. Writer spoke to pt, okay for writer to speak to mother. Pt said she will call her mother to discuss the keys

## 2023-09-23 NOTE — ED NOTES
Patient notifying staff of increasing symptoms of anxiety. Requesting PRN medication for this. Patient given options, and PRN Ativan administered.

## 2023-09-23 NOTE — PHARMACY-ADMISSION MEDICATION HISTORY
Pharmacist Admission Medication History    Admission medication history is complete. The information provided in this note is only as accurate as the sources available at the time of the update.    Medication reconciliation/reorder completed by provider prior to medication history? No    Information Source(s): Patient and CareEverywhere/SureScripts via in-person    Pertinent Information: Patient was seen at [facility] unit on 9/12/23 where quetiapine and venlafaxine doses were increased. Patient states since discharge from [facility] unit she has been taking her medications sporadically.     Changes made to PTA medication list:  Added: Adderall   Deleted: Benadryl, olanzapine, lisinopril  Changed: None       Allergies reviewed with patient and updates made in EHR: no    Medication History Completed By:   Jessica Chambers, PharmD, [facility]   Emergency Medicine Pharmacist  [phone] or Livia  September 22, 2023      Prior to Admission medications    Medication Sig Last Dose Taking? Auth Provider Long Term End Date   amphetamine-dextroamphetamine (ADDERALL XR) 15 MG 24 hr capsule Take 15 mg by mouth every morning Past Month at - Yes Unknown, Entered By History No    QUEtiapine (SEROQUEL) 200 MG tablet Take 1 tablet (200 mg) by mouth 2 times daily as needed (severe anxiety or agitation) Past Month at - Yes Gino Stuart MD Yes    QUEtiapine ER (SEROQUEL XR) 300 MG 24 hr tablet Take 1 tablet (300 mg) by mouth At Bedtime Past Month at - Yes Gino Stuart MD Yes    venlafaxine (EFFEXOR XR) 75 MG 24 hr capsule Take 1 capsule (75 mg) by mouth daily Past Month at - Yes Gino Stuart MD Yes

## 2023-09-24 ENCOUNTER — TELEPHONE (OUTPATIENT)
Dept: BEHAVIORAL HEALTH | Facility: CLINIC | Age: 43
End: 2023-09-24
Payer: COMMERCIAL

## 2023-09-24 PROCEDURE — 250N000013 HC RX MED GY IP 250 OP 250 PS 637: Performed by: EMERGENCY MEDICINE

## 2023-09-24 RX ORDER — LORAZEPAM 1 MG/1
1 TABLET ORAL ONCE
Status: COMPLETED | OUTPATIENT
Start: 2023-09-24 | End: 2023-09-24

## 2023-09-24 RX ADMIN — VENLAFAXINE HYDROCHLORIDE 75 MG: 75 CAPSULE, EXTENDED RELEASE ORAL at 08:40

## 2023-09-24 RX ADMIN — LORAZEPAM 1 MG: 1 TABLET ORAL at 02:08

## 2023-09-24 RX ADMIN — QUETIAPINE FUMARATE 200 MG: 200 TABLET ORAL at 16:23

## 2023-09-24 RX ADMIN — LORAZEPAM 1 MG: 1 TABLET ORAL at 12:47

## 2023-09-24 RX ADMIN — QUETIAPINE FUMARATE 300 MG: 300 TABLET, EXTENDED RELEASE ORAL at 23:16

## 2023-09-24 RX ADMIN — DEXTROAMPHETAMINE SACCHARATE, AMPHETAMINE ASPARTATE MONOHYDRATE, DEXTROAMPHETAMINE SULFATE, AND AMPHETAMINE SULFATE 15 MG: 3.75; 3.75; 3.75; 3.75 CAPSULE, EXTENDED RELEASE ORAL at 08:40

## 2023-09-24 RX ADMIN — OLANZAPINE 10 MG: 5 TABLET, ORALLY DISINTEGRATING ORAL at 14:33

## 2023-09-24 ASSESSMENT — ACTIVITIES OF DAILY LIVING (ADL)
ADLS_ACUITY_SCORE: 35

## 2023-09-24 NOTE — TELEPHONE ENCOUNTER
No appropriate beds are currently available within the  system. Bed search update @ 12AM:        [facility]: @ cap per website. Per Nick @ 00:03 they are full  [facility]: @ cap per website  [facility]: @ cap per website. Per Fiorella @ 00:04, they can take referrals but cannot review with their provider until day shift - already reviewing a few referrals   [facility]: @ cap per website  [facility]: @ cap per website  [facility]: @ cap per website  [facility]: @ cap per website  [facility]: @ cap per website  [facility]: @ cap per website  [facility]: Posting 5 beds. Mixed unit/Low acuity only.  [facility]: @ cap per website  [facility]: @ cap per website  [facility]: @ cap per website  [facility]: @ cap per website. Does not review after 10PM.    [facility]: Posting beds at all 2 locations. Per Carlos @ 00:07, [place] has 1 very low acuity bed on their mood d/o unit; [place] is reviewing to capacity and [place] has low acuity beds only  [facility]: Posting 3 beds (No hx of aggression. No sexual offenders. Voluntary patients only). Meets exclusionary d/t 72 HH  [facility]: Posting 8 beds (Low acuity only. Must have the cognitive ability to do programming. No aggressive or violent behavior or recent HX in the last 2 yrs. MH must be primary).  [facility]: @ cap per website  [facility]: @ cap per website. Low acuity, Neg Covid.  [facility]: Posting 4 beds (Covid neg. Voluntary only. Mixed unit. No aggressive or violent behavior. No registered sex offenders). Meets exclusionary d/t 72 HH  [facility]: Posting 2 beds (No hx of aggression/assault. No lines, drains or tubes. Does not provide detox or CD treatment). Per Robert @ 00:08, they have beds  [facility]: Posting 24 beds. Facility is in [place]. [place] 72 HH; cannot be placed in [place]  [facility]: Posting 5 beds (Mixed  unit/Low acuity). Per call @ 00:09 they have 1 bed on general/low acuity unit and 1 male bed on high acuity, which is case-by-case        Pt remains on work list until appropriate placement is available

## 2023-09-24 NOTE — ED NOTES
Report received, assumed care of patient, patient resting in bed, eyes closed, respirations even and non labored, awaiting inpatient  bed and admission.

## 2023-09-24 NOTE — PROGRESS NOTES
"Triage & Transition Services, Extended Care     Therapy Progress Note    Patient: Bria goes by \"Bria,\" uses she/her pronouns  Date of Service: September 24, 2023  Site of Service:  ED  Patient was seen virtually ([employer] cart or other teleconferencing device).     Presenting problem:   Bria is followed related to Placement delay: Boarding for IP MH placement . Please see initial DEC/LMHP Crisis Assessment completed by Roshan Orellana on 9/22/2023 for complete assessment information. Notable concerns include psychosis.     Individuals Present: Bria & Nadeem Redd    Session start: 3:39 PM  Session end: 3:55 PM   Session duration in minutes: 16  Session number: 1  Anticipated number of sessions or this episode of care: 1-4  CPT utilized: 06526 - Psychotherapy (with patient) - 30 (16-37*) min    Current Presentation:   Writer and patient met virtually.  Patient identifies feeling a bit better than when she came in, and reports feeling that \"Adderall Ativan and Zyprexa have helped me flatten out\".  She does state that she had SI yesterday, but has not felt that way today.  She does ask if writer can prescribe her medications, and writer denies this.  Patient does become somewhat more guarded following this, and reports that is what she needs to stay stable.  Patient is agreeable to a psych consult to discuss medications.  Additionally patient endorses that she has been using to help self-medicate, and outside the states that she deals with stress by running reading or writing.  Patient speech at times is somewhat hyperverbal and she appears quite anxious throughout the interaction.  At times patient does appear to take a while to respond to internal stimuli, however when asked she does endorse some visual hallucinations, but denied having any auditory hallucinations today. She endorses her auditory hallucinations being demeaning in nature, which increases her SI.  Additionally patient is somewhat focused on housing " issues and reports that she is only in her apartment until October 1.     Mental Status Exam:   Appearance: awake, alert  Attitude: somewhat cooperative  Eye Contact: fair  Mood: anxious  Affect: mood congruent  Speech: clear, coherent  Psychomotor Behavior: no evidence of tardive dyskinesia, dystonia, or tics  Thought Process:  evidence of thought blocking present  Associations: no loose associations  Thought Content: patient appears to be responding to internal stimuli  Insight: limited  Judgement: limited  Oriented to: time, person, and place  Attention Span and Concentration: intact  Recent and Remote Memory: intact    Diagnosis:    Schizophrenia (H) F20.9    Suicidal ideation R45.851    Auditory hallucinations R44.0    Depression with anxiety F41.8       Therapeutic Intervention(s):   Provided active listening, unconditional positive regard, and validation. Engaged in cognitive restructuring/ reframing, looked at common cognitive distortions and challenged negative thoughts.    Treatment Objective(s) Addressed:   The focus of this session was on rapport building and assessing safety .     Progress Towards Goals:   Patient reports improving symptoms. Patient is making progress towards treatment goals as evidenced by engaging in video session.     Case Management:   None, patient states she may have a  through [facility] but cannot recall who    General Recommendations:   Continue to monitor for harm. Consider: Use clear and concise directions, too many words can be overwhelming    Plan:   Inpatient Mental Health: Writer continues to recommend inpatient mental health placement today, however patient continues to improve, she may be able to engage in a lower level of care such as a crisis residence, or other outpatient programming.  Patient is on a 72-hour hold at this time.    Plan for Care reviewed with Assigned Medical Provider? Yes. Provider, Dr. Gagnon, response: Josue BARCENAS  Tramaine   Licensed Mental Health Professional (LMHP), [facility]  [phone]

## 2023-09-24 NOTE — ED NOTES
RN ED Mental Health Handoff Note    72 hour hold    Does patient require 1:1? Yes    Hold and rights been given and documented for patient: Yes    Is the patient in BH scrubs? Yes    Has the patient been searched? Yes    Is the 15 minute observation tool up to date? Yes    Was patient issued a welcome folder? Yes    Room check completed this shift: Yes    PSS3 and [place] Assessment/Reassessment this shift:    PSS-3      Date and Time Over the past 2 weeks have you felt down, depressed, or hopeless? Over the past 2 weeks have you had thoughts of killing yourself? Have you ever attempted to kill yourself? When did this last happen? User   09/22/23 1410 yes no no -- LESA          C-SSRS ([place])      Date and Time Q1 Wished to be Dead (Past Month) Q2 Suicidal Thoughts (Past Month) Q3 Suicidal Thought Method Q4 Suicidal Intent without Specific Plan Q5 Suicide Intent with Specific Plan Q6 Suicide Behavior (Lifetime) Within the Past 3 Months? RETIRED: Level of Risk per Screen Screening Not Complete User   09/22/23 1744 yes yes yes no yes -- -- -- -- BEB   09/22/23 1613 yes yes yes no yes no -- -- -- EO   09/22/23 1502 -- -- -- -- -- -- -- -- Refuses to answer [facility]            Behavioral status of patient: Green    Code 21 called this shift? No    Use of restraints/seclusion this shift? No    Most recent vital signs:      BP: 128/82 Pulse: 72   Resp: 18 SpO2: 99 % O2 Device: None (Room air)      Medications:  Scheduled medication compliance? Yes    PRN Meds administered this shift? Yes    Medications   acetaminophen (TYLENOL) tablet 650 mg (has no administration in time range)   ibuprofen (ADVIL/MOTRIN) tablet 600 mg (has no administration in time range)   LORazepam (ATIVAN) tablet 1 mg (1 mg Oral $Given 9/24/23 0208)   OLANZapine zydis (zyPREXA) ODT tab 10 mg (has no administration in time range)   QUEtiapine ER (SEROquel XR) 24 hr tablet 300 mg (300 mg Oral Not Given 9/24/23 0048)   venlafaxine (EFFEXOR XR) 24 hr capsule  75 mg (75 mg Oral $Given 9/23/23 0838)   amphetamine-dextroamphetamine (ADDERALL XR) ER cap 15 mg (15 mg Oral $Given 9/23/23 0838)   QUEtiapine (SEROquel) tablet 200 mg (has no administration in time range)   OLANZapine zydis (zyPREXA) ODT tab 5 mg (5 mg Oral $Given 9/22/23 1416)         ADLs    Meal Provided this shift? No    Hygiene items provided? Yes    ADLs completed? Yes    Date of last shower: unknown    Any significant events this shift? No    Any information that would be helpful in caring for this patient?  cooperative    Family present/updated? No    Location of patient's belongings: secure    Critical Care Minutes:  Does the patient need critical care minutes documented? No

## 2023-09-24 NOTE — TELEPHONE ENCOUNTER
R: [place]  Access Inpatient Bed Call Log 9/23/23 @6:30 PM   Intake has called facilities that have not updated the bed status within the last 12 hours.                                 [facility] is at capacity.               [facility] is posting 0 beds. [phone].  Per Katya 6:19PM at [facility] is posting 0 beds. Negative covid required.  Per Monica 6:29PM at [facility] is posting 0 bed. Negative covid required. [phone]. Per call @6:15PM to [facility] - Very low acuity bed available.  Pt not appropriate  [facility] is posting 0 beds.   Per Monica 6:29PM at [facility] is posting 0 beds. [phone].  Per Jany, no beds available.    [facility] is posting 0 beds  Per Monica 6:29PM at [facility] is posting 0 beds.  Per Monica 6:29PM at [facility] is posting 0 beds. Low acuity only.   Per Monica 6:29PM at [facility] is posting 4 beds. LOW acuity ONLY. Mixed unit 12+. Negative covid- [phone].    Low acuity bed not appropriate  [facility] has 0 beds posted. No aggression. Negative Covid. Low acuity.     Per Monica 6:29PM at [facility] is posting 0 beds. Negative covid. [phone].      [facility] ([place] is posting 1 bed. Low acuity only. Negative covid.  [phone].   Low acuity bed not appropriate.  6:45PM per Eduarda, facility is full  [facility] is posting 0 bed. Low acuity. No current aggression.   Per Monica 6:29PM at [facility] ([place]) is posting 1 bed available. Negative covid.  [phone].  Very low acuity only.  Mood disorder.  Pt not appropriate.  [facility] is posting 0 beds. Low acuity. 72 HH hold preferred. Negative covid required.  [phone]     [facility] is posting 4 beds. Low acuity only. Negative covid.  [phone].   Per Eduarda, pt not appropriate for low acuity bed.  [facility] [place] is posting 3 beds.  Negative covid required.   Vol only, No history of aggression, violence, or assault. No sexual offenders. No 72 HH holds. [phone].   Pt not appropriate due to hold status          [facility] is posting 8 beds. Negative covid required.  (Must have the cognitive ability to do programming. No aggressive or violent behavior or recent HX in the last 2 yrs. MH must be primary.) Always low acuity. [phone]     [facility] has 0 beds posted. Negative covid required.  Low acuity only. Violence and aggression capped.  [phone]. Low acuity only.      [facility] is posting 0 bed. Low acuity, Negative covid required.  [phone].    [facility] is posting 4 beds. Negative covid required.  Vol only. Combined adolescent and adult unit. No aggressive or violent behavior. No registered sex offenders.  [phone].     Not appropriate due to hold status  [facility] posting 2 beds Negative covid required.  [phone]     [facility] [place] is posting 2 bed. Negative covid. LOW acuity. (No lines, drains, or tubes, oxygen, CPAP, IV, etc.). [phone].     [facility] is posting 16 beds. No covid test required.  [phone].     [facility] ([facility] is posting 5 beds. Negative covid. (No. lines, drains, or tubes, oxygen, CPAP, IV, etc.). [phone].            Pt remains on the work list pending appropriate bed availability.

## 2023-09-24 NOTE — ED NOTES
Pt searched again by writer and security. Pt had previously concealed a vape pen that security had already confiscated. Pt also had been keeping a cellphone secure in their underwear. Pt escalating verbally and frequently seeking out staff. Due to escalation of patient's behavior and concealment of objects pt's cellphones removed from room and patient advised they cannot be returned until their behavior changes. Pt verbalized understanding.

## 2023-09-24 NOTE — ED PROVIDER NOTES
Sign Out Note     Pt accepted in sign out from: Dr. Bowen.    Briefly pt presented to the ED for: Patient known to me from previous shift.  Presents with decompensated schizophrenia.  Currently on a 72-hour hold.     Plan at time of sign out: Await placement     Care of patient during my shift: No issues during my shift.  Extended care saw patient.  Continue to await bed.  They will place psych consult.     Plan for patient at this time: Care of patient will be signed out to the oncoming physician, Dr. Jefferson.     MD Chelsi Mendez, Shalini Rossi MD  09/24/23 6668

## 2023-09-24 NOTE — ED NOTES
Patient pacing in room, pressing call light every 5 min, trying to walk out of room constantly, and wanting to use the restroom several times. Patient wanting phone and other personal items back. Nursing staff explained they were placed in DEC office for the time being. PRN medications offered.

## 2023-09-24 NOTE — ED NOTES
RN ED Mental Health Handoff Note    72 hour hold    Does patient require 1:1? Yes    Hold and rights been given and documented for patient: Yes    Is the patient in BH scrubs? Yes    Has the patient been searched? Yes    Is the 15 minute observation tool up to date? Yes    Was patient issued a welcome folder? Yes    Room check completed this shift: Yes    PSS3 and [place] Assessment/Reassessment this shift:    PSS-3      Date and Time Over the past 2 weeks have you felt down, depressed, or hopeless? Over the past 2 weeks have you had thoughts of killing yourself? Have you ever attempted to kill yourself? When did this last happen? User   09/22/23 1410 yes no no -- LESA          C-SSRS ([place])      Date and Time Q1 Wished to be Dead (Past Month) Q2 Suicidal Thoughts (Past Month) Q3 Suicidal Thought Method Q4 Suicidal Intent without Specific Plan Q5 Suicide Intent with Specific Plan Q6 Suicide Behavior (Lifetime) Within the Past 3 Months? RETIRED: Level of Risk per Screen Screening Not Complete User   09/22/23 1744 yes yes yes no yes -- -- -- -- BEB   09/22/23 1613 yes yes yes no yes no -- -- -- EO   09/22/23 1502 -- -- -- -- -- -- -- -- Refuses to answer [facility]            Behavioral status of patient: Yellow. Patient impulsive all afternoon, walking in and out of room, pacing, making numerous demands, PRN medications provided several times    Code 21 called this shift? No    Use of restraints/seclusion this shift? No    Most recent vital signs:                       Medications:  Scheduled medication compliance? Yes    PRN Meds administered this shift? Yes    Medications   acetaminophen (TYLENOL) tablet 650 mg (has no administration in time range)   ibuprofen (ADVIL/MOTRIN) tablet 600 mg (has no administration in time range)   LORazepam (ATIVAN) tablet 1 mg (1 mg Oral $Given 9/24/23 0208)   OLANZapine zydis (zyPREXA) ODT tab 10 mg (10 mg Oral $Given 9/24/23 1433)   QUEtiapine ER (SEROquel XR) 24 hr tablet 300 mg (300  mg Oral Not Given 9/24/23 0048)   venlafaxine (EFFEXOR XR) 24 hr capsule 75 mg (75 mg Oral $Given 9/24/23 0840)   amphetamine-dextroamphetamine (ADDERALL XR) ER cap 15 mg (15 mg Oral $Given 9/24/23 0840)   QUEtiapine (SEROquel) tablet 200 mg (200 mg Oral $Given 9/24/23 1623)   OLANZapine zydis (zyPREXA) ODT tab 5 mg (5 mg Oral $Given 9/22/23 1416)   LORazepam (ATIVAN) tablet 1 mg (1 mg Oral $Given 9/24/23 1247)         ADLs    Meal Provided this shift? Yes    Hygiene items provided? Yes    ADLs completed? Yes    Date of last shower: Unknown    Any significant events this shift? No    Any information that would be helpful in caring for this patient?  Patient impulsive and demanding at times.     Family present/updated? 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 No

## 2023-09-24 NOTE — TELEPHONE ENCOUNTER
R: [place]  Access Inpatient Bed Call Log 9/23/23 @7:35 PM   Intake has called facilities that have not updated the bed status within the last 12 hours.                                 [facility] is at capacity.               [facility] is posting 0 beds. [phone].     [facility] is posting 0 beds. Negative covid required.                  [facility] is posting 0 bed. Negative covid required. [phone]. Per call @6:15PM to [facility] - Very low acuity bed available.  [facility] is posting 0 beds.         [facility] is posting 0 beds. [phone].  Per Jany, no beds available.    [facility] is posting 0 beds            [facility] is posting 0 beds.    [facility] is posting 0 beds. Low acuity only.              Pt remains on PPS worklist awaiting appropriate placement.

## 2023-09-24 NOTE — ED NOTES
Security observed patient take vape pen out of their pants and use it. Patient willingly gave vape pen to . Notified primary RN and MD.

## 2023-09-24 NOTE — ED NOTES
"Pt informed that their mother is looking for keys. Pt reports that he \"isn't in a mental place to do that right now\".   "

## 2023-09-25 ENCOUNTER — TELEPHONE (OUTPATIENT)
Dept: BEHAVIORAL HEALTH | Facility: CLINIC | Age: 43
End: 2023-09-25
Payer: COMMERCIAL

## 2023-09-25 ENCOUNTER — DOCUMENTATION ONLY (OUTPATIENT)
Dept: OTHER | Facility: CLINIC | Age: 43
End: 2023-09-25
Payer: COMMERCIAL

## 2023-09-25 LAB
ALBUMIN UR-MCNC: NEGATIVE MG/DL
AMPHETAMINES UR QL SCN: ABNORMAL
APPEARANCE UR: CLEAR
BACTERIA #/AREA URNS HPF: ABNORMAL /HPF
BARBITURATES UR QL SCN: ABNORMAL
BENZODIAZ UR QL SCN: ABNORMAL
BILIRUB UR QL STRIP: NEGATIVE
BZE UR QL SCN: ABNORMAL
CANNABINOIDS UR QL SCN: ABNORMAL
COLOR UR AUTO: ABNORMAL
FENTANYL UR QL: ABNORMAL
GLUCOSE UR STRIP-MCNC: NEGATIVE MG/DL
HCG UR QL: NEGATIVE
HGB UR QL STRIP: NEGATIVE
KETONES UR STRIP-MCNC: NEGATIVE MG/DL
LEUKOCYTE ESTERASE UR QL STRIP: ABNORMAL
NITRATE UR QL: NEGATIVE
OPIATES UR QL SCN: ABNORMAL
PCP QUAL URINE (ROCHE): ABNORMAL
PH UR STRIP: 5.5 [PH] (ref 5–7)
RBC URINE: 8 /HPF
SP GR UR STRIP: 1.01 (ref 1–1.03)
UROBILINOGEN UR STRIP-MCNC: NORMAL MG/DL
WBC CLUMPS #/AREA URNS HPF: PRESENT /HPF
WBC URINE: 101 /HPF

## 2023-09-25 PROCEDURE — 250N000013 HC RX MED GY IP 250 OP 250 PS 637: Performed by: EMERGENCY MEDICINE

## 2023-09-25 PROCEDURE — 87186 SC STD MICRODIL/AGAR DIL: CPT | Performed by: EMERGENCY MEDICINE

## 2023-09-25 PROCEDURE — 81001 URINALYSIS AUTO W/SCOPE: CPT | Performed by: EMERGENCY MEDICINE

## 2023-09-25 PROCEDURE — 81025 URINE PREGNANCY TEST: CPT | Performed by: PHYSICIAN ASSISTANT

## 2023-09-25 PROCEDURE — 80307 DRUG TEST PRSMV CHEM ANLYZR: CPT | Performed by: PHYSICIAN ASSISTANT

## 2023-09-25 RX ORDER — OLANZAPINE 5 MG/1
5 TABLET, ORALLY DISINTEGRATING ORAL ONCE
Status: COMPLETED | OUTPATIENT
Start: 2023-09-25 | End: 2023-09-25

## 2023-09-25 RX ORDER — CEPHALEXIN 500 MG/1
500 CAPSULE ORAL EVERY 8 HOURS SCHEDULED
Status: DISCONTINUED | OUTPATIENT
Start: 2023-09-25 | End: 2023-09-27 | Stop reason: HOSPADM

## 2023-09-25 RX ORDER — LORAZEPAM 1 MG/1
1 TABLET ORAL ONCE
Status: COMPLETED | OUTPATIENT
Start: 2023-09-25 | End: 2023-09-25

## 2023-09-25 RX ADMIN — VENLAFAXINE HYDROCHLORIDE 75 MG: 75 CAPSULE, EXTENDED RELEASE ORAL at 08:17

## 2023-09-25 RX ADMIN — QUETIAPINE FUMARATE 300 MG: 300 TABLET, EXTENDED RELEASE ORAL at 21:08

## 2023-09-25 RX ADMIN — LORAZEPAM 1 MG: 1 TABLET ORAL at 08:17

## 2023-09-25 RX ADMIN — DEXTROAMPHETAMINE SACCHARATE, AMPHETAMINE ASPARTATE MONOHYDRATE, DEXTROAMPHETAMINE SULFATE, AND AMPHETAMINE SULFATE 15 MG: 3.75; 3.75; 3.75; 3.75 CAPSULE, EXTENDED RELEASE ORAL at 08:17

## 2023-09-25 RX ADMIN — LORAZEPAM 1 MG: 1 TABLET ORAL at 15:18

## 2023-09-25 RX ADMIN — OLANZAPINE 10 MG: 5 TABLET, ORALLY DISINTEGRATING ORAL at 21:40

## 2023-09-25 RX ADMIN — OLANZAPINE 10 MG: 5 TABLET, ORALLY DISINTEGRATING ORAL at 10:43

## 2023-09-25 ASSESSMENT — ACTIVITIES OF DAILY LIVING (ADL)
ADLS_ACUITY_SCORE: 35

## 2023-09-25 NOTE — PROGRESS NOTES
"Triage & Transition Services, Extended Care     Therapy Progress Note    Patient: Bria goes by \"Bria,\" uses she/her pronouns  Date of Service: September 25, 2023  Site of Service: [facility] Emergency Department  Patient was seen virtually ([employer] cart or other teleconferencing device).     Presenting problem:   Bria is followed related to Long wait time for admission and 72 Hour Hold: place 9/23/23. Please see initial DEC/[facility] Crisis Assessment completed by Roshan Hunter on 9/22/23 for complete assessment information. Notable concerns include suicidal ideation, psychosis.     Individuals Present: Bria & VERENICE White    Session start: 2:52pm  Session end: 3:10pm  Session duration in minutes: 18 minutes  Session number: 2  Anticipated number of sessions or this episode of care: 2-4  CPT utilized: 58039 - Psychotherapy (with patient) - 30 (16-37*) min    Current Presentation:     Patient reports feeling depressed.  She states she feels the medication she was started on seems to be helping.  She reports she is not as worried as before.  Patient reports being able to think of the future some.  Patient asks about her phone which is she has been trying to get staff to give her.  Patient identifies continue auditory hallucinations.  Patient continues to appear and report being quite anxious.  She inquires about medication to calm her.       Mental Status Exam:   Appearance: awake, alert  Attitude: somewhat cooperative  Eye Contact: fair  Mood: anxious  Affect: mood congruent  Speech: clear, coherent  Psychomotor Behavior: no evidence of tardive dyskinesia, dystonia, or tics  Thought Process:  evidence of thought blocking present  Associations: no loose associations  Thought Content: patient appears to be responding to internal stimuli  Insight: limited  Judgement: limited  Oriented to: time, person, and place  Attention Span and Concentration: intact  Recent and Remote Memory: intact    Diagnosis:    Schizophrenia (H) " F20.9    Suicidal ideation R45.851    Auditory hallucinations R44.0    Depression with anxiety F41.8       Therapeutic Intervention(s):   Provided active listening, unconditional positive regard, and validation.     Treatment Objective(s) Addressed:   The focus of this session was on rapport building and assessing safety.     Progress Towards Goals:   Patient reports improving symptoms.       Case Management:   Reached out to [facility] [employer] and received clarification of patient's guardianship status.  Mother is patient's guardian.  Spoke with mother and confirmed her consent for patient's admission.  Updated patient placement with guardian's placement preferences.     General Recommendations:   Continue to monitor for harm. Consider: Use a positive, direct and calm approach. Pt's tend to match the energy/mood of the staff. Keep focus positive and upbeat, Provide the pt with options to provide a sense of control. Try to tell the pt what they can do instead of what they can't do, and Be firm but gentle when redirecting    Plan:   Inpatient Mental Health: Plan for inpatient mental health admission.  Patient is on a 72HH on 9/23/23.  It was clarified today that patient's mother is her guardian.  Guardian consents to mental health admission    Plan for Care reviewed with Assigned Medical Provider? Yes. Provider, Dr Lopez, response: agreement     Ariadna Sanz [facility]   Licensed Mental Health Professional (LMHP), [facility]  [phone]

## 2023-09-25 NOTE — ED NOTES
Patient requesting medication for anxiety. Informed MD. VENEGASyprexa was ordered. Patient refusing medication now.

## 2023-09-25 NOTE — TELEPHONE ENCOUNTER
No appropriate beds are currently available within the  system. Bed search update (Statewide) @ 12AM:        [facility]: @ cap per website. Per Abbi @ 00:03 they are full  [facility]: @ cap per website  [facility]: @ cap per website. Per Fiorella @ 00:04, only low acuity beds on their step-down unit available - cannot complete any reviews until after 8AM and reviewing a few referrals already.   [facility]: @ cap per website  [facility]: @ cap per website  [facility]: @ cap per website  [facility]: @ cap per website  [facility]: @ cap per website  [facility]: @ cap per website  [facility]: @ cap per website  [facility]: @ cap per website  [facility]: @ cap per website  [facility]: @ cap per website  [facility]: Does not review after 10PM.    [facility]: Posting beds in [place] and [place]. Per Dylan @ 00:06, only very low acuity beds in [place] as they have high unit acuity - Pt not appropriate for current beds  [facility]: Posting 3 beds (No hx of aggression. No sexual offenders. Voluntary patients only). Meets exclusionary d/t 72 HH  [facility]: Posting 8 beds (Low acuity only. Must have the cognitive ability to do programming. No aggressive or violent behavior or recent HX in the last 2 yrs. MH must be primary). Pt not appropriate d/t acuity  [facility]: @ cap per website  [facility] @ cap per website. Low acuity, Neg Covid.  [facility]: Posting 4 beds (Covid neg. Voluntary only. Mixed unit. No aggressive or violent behavior. No registered sex offenders). Meets exclusionary d/t 72 HH  [facility]: @ cap per website (No hx of aggression/assault. No lines, drains or tubes. Does not provide detox or CD treatment).  [facility]: Posting 16 beds. Facility is in [place]. [place] 72 HH; cannot be placed in [place]   [facility]: Posting 2 beds (Mixed unit/Low acuity). Per Ana M @  00:13, no appropriate beds available tonight - Unit has very high acuity         Pt remains on work list until appropriate placement is available

## 2023-09-25 NOTE — ED NOTES
Patient again came out and approached my workspace when I was reviewing another patient's chart. Patient has no concerns for boundaries or privacy at this time. Educated patient regarding this. Patient then whispering for her phone back. I educated the patient that the policy that's that patients on holds are to not have phones.

## 2023-09-25 NOTE — ED NOTES
Went in room to give patient her scheduled Seroquel. Pt states she does not want to take at this time. I offered to return at at 2300 and they asked for it to be at 2315. Will try to give at 2315.

## 2023-09-25 NOTE — ED NOTES
RN ED Mental Health Handoff Note    72 hour hold    Does patient require 1:1? Yes    Hold and rights been given and documented for patient: Yes    Is the patient in BH scrubs? Yes    Has the patient been searched? Yes    Is the 15 minute observation tool up to date? Yes    Was patient issued a welcome folder? Yes    Room check completed this shift: Yes    PSS3 and [place] Assessment/Reassessment this shift:    PSS-3      Date and Time Over the past 2 weeks have you felt down, depressed, or hopeless? Over the past 2 weeks have you had thoughts of killing yourself? Have you ever attempted to kill yourself? When did this last happen? User   09/22/23 1410 yes no no -- LESA          C-SSRS ([place])      Date and Time Q1 Wished to be Dead (Past Month) Q2 Suicidal Thoughts (Past Month) Q3 Suicidal Thought Method Q4 Suicidal Intent without Specific Plan Q5 Suicide Intent with Specific Plan Q6 Suicide Behavior (Lifetime) Within the Past 3 Months? RETIRED: Level of Risk per Screen Screening Not Complete User   09/22/23 1744 yes yes yes no yes -- -- -- -- BEB   09/22/23 1613 yes yes yes no yes no -- -- -- EO   09/22/23 1502 -- -- -- -- -- -- -- -- Refuses to answer [facility]            Behavioral status of patient: Green    Code 21 called this shift? No    Use of restraints/seclusion this shift? No    Most recent vital signs:                       Medications:  Scheduled medication compliance? Yes    PRN Meds administered this shift? No    Medications   acetaminophen (TYLENOL) tablet 650 mg (has no administration in time range)   ibuprofen (ADVIL/MOTRIN) tablet 600 mg (has no administration in time range)   LORazepam (ATIVAN) tablet 1 mg (1 mg Oral $Given 9/24/23 0208)   OLANZapine zydis (zyPREXA) ODT tab 10 mg (10 mg Oral $Given 9/24/23 1433)   QUEtiapine ER (SEROquel XR) 24 hr tablet 300 mg (300 mg Oral $Given 9/24/23 2316)   venlafaxine (EFFEXOR XR) 24 hr capsule 75 mg (75 mg Oral $Given 9/24/23 0328)    amphetamine-dextroamphetamine (ADDERALL XR) ER cap 15 mg (15 mg Oral $Given 9/24/23 8174)   QUEtiapine (SEROquel) tablet 200 mg (200 mg Oral $Given 9/24/23 1623)   OLANZapine zydis (zyPREXA) ODT tab 5 mg (5 mg Oral $Given 9/22/23 1416)   LORazepam (ATIVAN) tablet 1 mg (1 mg Oral $Given 9/24/23 1247)         ADLs    Meal Provided this shift? Yes    Hygiene items provided? Yes    ADLs completed? Yes    Date of last shower: unknown    Any significant events this shift? No    Any information that would be helpful in caring for this patient?      Family present/updated? 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 No

## 2023-09-25 NOTE — TELEPHONE ENCOUNTER
R: [facility] Access Inpatient Bed Call Log 9/25/23 @ 8:30 AM      Intake has called facilities that have not updated the bed status within the last 12 hours.                                         [facility] is at capacity.                   [facility] is posting 0 beds. [phone].         [facility] is posting 0 beds. Negative covid required.                      [facility] is posting 0 beds. Negative covid required. [phone].         [facility]  posting 0 Beds             [facility] is posting 0 beds. [phone].  Per Stanis[place] -NO BEDs      [facility] is posting [facility] beds.        [facility] is posting 0 beds. Low acuity only.               [facility] is posting 0 beds. LOW acuity ONLY. Mixed unit 12+. Negative covid- [phone].          [facility] has 0 beds posted. No aggression. Negative Covid. Low acuity.       [facility] is posting 0 beds. Negative covid. [phone].          [facility] is posting 1 beds. Low acuity only. Negative covid.              [phone].    *Closed UFN per call to Dena @ 8:27 AM     [facility] is posting 0 beds. Low acuity. No current aggression.          [facility] ([place]) is posting 0 beds available. Negative covid.  [phone].         [facility] ([place]) is posting 6 beds. Low acuity only. Negative covid.  [phone].         [facility] is posting 1 bed. Low acuity. 72 HH hold              preferred. Negative covid required. [phone]         [facility] in [place] is posting 3 beds.  Negative covid required.   Vol only, No history of aggression, violence, or assault. No sexual offenders. No 72 HH holds. [phone].               [facility] is posting 7 beds. Negative covid required.  (Must have the cognitive ability to do programming. No  aggressive or violent behavior or recent HX in the last 2 yrs. MH must be primary.) Always low acuity. [phone]         [facility] has 0 beds posted. Negative covid required.  Low acuity only. Violence and aggression capped.  [phone]. Low acuity only.          [facility] is posting 0 bed. Low acuity, Negative covid required.  [phone].        [facility] is posting 5 beds. Negative covid required.  Vol only. Combined adolescent and adult unit. No aggressive or violent behavior. No registered sex offenders.  [phone].           [facility] posting 0 beds Negative covid required.   [phone]         [facility] is posting 2 beds. Negative covid. LOW acuity. (No lines, drains, or tubes, oxygen, CPAP, IV, etc.). [phone].         [facility] ([facility] is posting 2 beds. Negative covid. (No. lines, drains, or tubes, oxygen, CPAP, IV, etc.). [phone].                    Pt remains on the work list pending appropriate bed availability.            11:49a Received call from MEÑO Rosenthal informing that [employer] has confirmed that pt's mother is their legal guardian. DEC spoke with mother for placement preference, pt's [place]. Intake has updated work lists.

## 2023-09-25 NOTE — ED NOTES
Patient frequently coming out the door and asking for things and interrupting the care of other patients. Told the patient multiple times I will be more than happy to go in and talk with her once I have had a chance to review patient charts. She has interrupted 8 times in the first 30 minutes of my arrival. Room checked for safety. Updated patient on plan of care.

## 2023-09-26 ENCOUNTER — TELEPHONE (OUTPATIENT)
Dept: BEHAVIORAL HEALTH | Facility: CLINIC | Age: 43
End: 2023-09-26
Payer: COMMERCIAL

## 2023-09-26 PROCEDURE — 250N000013 HC RX MED GY IP 250 OP 250 PS 637: Performed by: EMERGENCY MEDICINE

## 2023-09-26 RX ADMIN — DEXTROAMPHETAMINE SACCHARATE, AMPHETAMINE ASPARTATE MONOHYDRATE, DEXTROAMPHETAMINE SULFATE, AND AMPHETAMINE SULFATE 15 MG: 3.75; 3.75; 3.75; 3.75 CAPSULE, EXTENDED RELEASE ORAL at 10:41

## 2023-09-26 RX ADMIN — ACETAMINOPHEN 650 MG: 325 TABLET, FILM COATED ORAL at 17:28

## 2023-09-26 RX ADMIN — CEPHALEXIN 500 MG: 500 CAPSULE ORAL at 06:07

## 2023-09-26 RX ADMIN — CEPHALEXIN 500 MG: 500 CAPSULE ORAL at 15:14

## 2023-09-26 RX ADMIN — CEPHALEXIN 500 MG: 500 CAPSULE ORAL at 00:13

## 2023-09-26 RX ADMIN — QUETIAPINE FUMARATE 300 MG: 300 TABLET, EXTENDED RELEASE ORAL at 22:17

## 2023-09-26 RX ADMIN — CEPHALEXIN 500 MG: 500 CAPSULE ORAL at 22:17

## 2023-09-26 RX ADMIN — OLANZAPINE 10 MG: 5 TABLET, ORALLY DISINTEGRATING ORAL at 17:28

## 2023-09-26 RX ADMIN — VENLAFAXINE HYDROCHLORIDE 75 MG: 75 CAPSULE, EXTENDED RELEASE ORAL at 10:41

## 2023-09-26 ASSESSMENT — ACTIVITIES OF DAILY LIVING (ADL)
ADLS_ACUITY_SCORE: 35

## 2023-09-26 NOTE — TELEPHONE ENCOUNTER
R: [facility] Access Inpatient Bed Call Log 9/25/23 @ 5:50PM    Intake has called facilities that have not updated the bed status within the last 12 hours.                                     [facility] only:     [facility] is at capacity.                 [facility] is posting 0 beds. [phone].       [facility] is posting 0 beds. Negative covid required.                    [facility] is posting 0 beds. Negative covid required. [phone].       [place] [facility] posting 0 Beds           [facility] is posting 0 beds. [phone].  Per [place] -NO BEDs   [facility] is posting 12 beds.  Vol only.  COVID test required.  No sexual offenders.  Call [phone].  Young adult only.   [facility] is posting 0 beds.      [facility] is posting 0 beds. Low acuity only.             [facility] is posting 6 beds. LOW acuity ONLY. Mixed unit 12+. Negative covid- [phone].        [facility] has 0 beds posted. No aggression. Negative Covid. Low acuity.     [facility] is posting 0 beds. Negative covid. [phone].        [facility] is posting 0 bed. Low acuity only. Negative covid.            [phone].    *Closed UFN per call to Dena @ 8:27 AM   [facility] is posting 0 beds. Low acuity. No current aggression.        [facility] ([place]) is posting 1 bed available. Very low acuity.  Mood disorder only.  Negative covid.  [phone].       [facility] ([place]) is posting 6 beds. Low acuity only. Negative covid.  [phone].                   Pt remains on the work list pending appropriate bed availability.

## 2023-09-26 NOTE — TELEPHONE ENCOUNTER
R: [place]  [place] Inpatient Bed Call Log 9/26/23 @ 1:30am   Intake has called facilities that have not updated their bed status within the last 12 hours.??     *[place]  [place] -- [facility]: @ cap per website.  [place] -- [facility]:  @ cap per website.  [place] -- [facility]: @ cap per website.  [place] [facility]: @ cap per website.  [place] [facility]: @ cap per website.  [facility]: @ cap per website.  [place] [facility] - POSTING 1 BED. Voluntary adults only ages 18-25, COVID test req'd, no aggression, physical or sexual assault, or violence hx.  [place] -- [facility]: @ cap per website.  [place] -- [facility]: @ cap per website.  [place] [facility]:  @ cap per website.  [facility]: @ cap per website. Low acuity only    [facility]: @ cap per website. Low acuity only. Prefer med adjustments placement.  [facility]:  POSTING 1 BED. Low acuity only. No current aggression.     Pt remains on waitlist pending appropriate placement availability

## 2023-09-26 NOTE — ED NOTES
RN ED Mental Health Handoff Note    72 hour hold    Does patient require 1:1? No    Hold and rights been given and documented for patient: Yes    Is the patient in  scrubs? Yes    Has the patient been searched? Yes    Is the 15 minute observation tool up to date? Yes    Was patient issued a welcome folder? Yes    Room check completed this shift: Yes    PSS3 and [place] Assessment/Reassessment this shift:    PSS-3      Date and Time Over the past 2 weeks have you felt down, depressed, or hopeless? Over the past 2 weeks have you had thoughts of killing yourself? Have you ever attempted to kill yourself? When did this last happen? User   09/22/23 1410 yes no no -- LESA          C-SSRS ([place])      Date and Time Q1 Wished to be Dead (Past Month) Q2 Suicidal Thoughts (Past Month) Q3 Suicidal Thought Method Q4 Suicidal Intent without Specific Plan Q5 Suicide Intent with Specific Plan Q6 Suicide Behavior (Lifetime) Within the Past 3 Months? RETIRED: Level of Risk per Screen Screening Not Complete User   09/22/23 1744 yes yes yes no yes -- -- -- -- BEB   09/22/23 1613 yes yes yes no yes no -- -- -- EO   09/22/23 1502 -- -- -- -- -- -- -- -- Refuses to answer [facility]            Behavioral status of patient: Green    Code 21 called this shift? No    Use of restraints/seclusion this shift? No    Most recent vital signs:  Temp: 98  F (36.7  C) Temp src: Temporal BP: (!) 169/126 Pulse: (!) 13   Resp: 19 SpO2: 98 % O2 Device: None (Room air)      Medications:  Scheduled medication compliance? Yes    PRN Meds administered this shift? Yes    Medications   acetaminophen (TYLENOL) tablet 650 mg (has no administration in time range)   ibuprofen (ADVIL/MOTRIN) tablet 600 mg (has no administration in time range)   LORazepam (ATIVAN) tablet 1 mg (1 mg Oral $Given 9/25/23 0817)   OLANZapine zydis (zyPREXA) ODT tab 10 mg (10 mg Oral $Given 9/25/23 2140)   QUEtiapine ER (SEROquel XR) 24 hr tablet 300 mg (300 mg Oral $Given 9/25/23 2108)    venlafaxine (EFFEXOR XR) 24 hr capsule 75 mg (75 mg Oral $Given 9/25/23 0817)   amphetamine-dextroamphetamine (ADDERALL XR) ER cap 15 mg (15 mg Oral $Given 9/25/23 0817)   QUEtiapine (SEROquel) tablet 200 mg (200 mg Oral $Given 9/24/23 1623)   cephALEXin (KEFLEX) capsule 500 mg (500 mg Oral $Given 9/26/23 0013)   OLANZapine zydis (zyPREXA) ODT tab 5 mg (5 mg Oral $Given 9/22/23 1416)   LORazepam (ATIVAN) tablet 1 mg (1 mg Oral $Given 9/24/23 1247)   OLANZapine zydis (zyPREXA) ODT tab 5 mg (5 mg Oral Not Given 9/25/23 1514)   LORazepam (ATIVAN) tablet 1 mg (1 mg Oral $Given 9/25/23 1518)         ADLs    Meal Provided this shift? Yes    Hygiene items provided? Yes    ADLs completed? Yes    Date of last shower:     Any significant events this shift? No    Any information that would be helpful in caring for this patient?      Family present/updated? No    Location of patient's belongings:     Critical Care Minutes:  Does the patient need critical care minutes documented? No

## 2023-09-26 NOTE — ED NOTES
RN ED Mental Health Handoff Note    Voluntary    Does patient require 1:1? No    Hold and rights been given and documented for patient: Yes    Is the patient in  scrubs? Yes    Has the patient been searched? Yes    Is the 15 minute observation tool up to date? Yes    Was patient issued a welcome folder? Yes    Room check completed this shift: Yes    PSS3 and [place] Assessment/Reassessment this shift:    PSS-3      Date and Time Over the past 2 weeks have you felt down, depressed, or hopeless? Over the past 2 weeks have you had thoughts of killing yourself? Have you ever attempted to kill yourself? When did this last happen? User   09/22/23 1410 yes no no -- LESA          C-SSRS ([place])      Date and Time Q1 Wished to be Dead (Past Month) Q2 Suicidal Thoughts (Past Month) Q3 Suicidal Thought Method Q4 Suicidal Intent without Specific Plan Q5 Suicide Intent with Specific Plan Q6 Suicide Behavior (Lifetime) Within the Past 3 Months? RETIRED: Level of Risk per Screen Screening Not Complete User   09/26/23 1211 yes yes yes yes yes no -- -- -- MAL   09/22/23 1744 yes yes yes no yes -- -- -- -- BEB   09/22/23 1613 yes yes yes no yes no -- -- -- EO   09/22/23 1502 -- -- -- -- -- -- -- -- Refuses to answer [facility]            Behavioral status of patient: Green    Code 21 called this shift? No    Use of restraints/seclusion this shift? No    Most recent vital signs:  Temp: 99.2  F (37.3  C) Temp src: Oral BP: 129/88 Pulse: 88   Resp: 18 SpO2: 98 % O2 Device: None (Room air)      Medications:  Scheduled medication compliance? Yes    PRN Meds administered this shift? No    Medications   acetaminophen (TYLENOL) tablet 650 mg (has no administration in time range)   ibuprofen (ADVIL/MOTRIN) tablet 600 mg (has no administration in time range)   LORazepam (ATIVAN) tablet 1 mg (1 mg Oral $Given 9/25/23 2323)   OLANZapine zydis (zyPREXA) ODT tab 10 mg (10 mg Oral $Given 9/25/23 9070)   QUEtiapine ER (SEROquel XR) 24 hr tablet 300 mg  (300 mg Oral $Given 9/25/23 2108)   venlafaxine (EFFEXOR XR) 24 hr capsule 75 mg (75 mg Oral $Given 9/26/23 1041)   amphetamine-dextroamphetamine (ADDERALL XR) ER cap 15 mg (15 mg Oral $Given 9/26/23 1041)   QUEtiapine (SEROquel) tablet 200 mg (200 mg Oral $Given 9/24/23 1623)   cephALEXin (KEFLEX) capsule 500 mg (500 mg Oral $Given 9/26/23 1514)   OLANZapine zydis (zyPREXA) ODT tab 5 mg (5 mg Oral $Given 9/22/23 1416)   LORazepam (ATIVAN) tablet 1 mg (1 mg Oral $Given 9/24/23 1247)   OLANZapine zydis (zyPREXA) ODT tab 5 mg (5 mg Oral Not Given 9/25/23 1514)   LORazepam (ATIVAN) tablet 1 mg (1 mg Oral $Given 9/25/23 1518)         ADLs    Meal Provided this shift? Yes    Hygiene items provided? Yes    ADLs completed? Yes    Date of last shower: Unknown    Any significant events this shift? No    Any information that would be helpful in caring for this patient?  They/them pronouns    Family present/updated? 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 No

## 2023-09-26 NOTE — TELEPHONE ENCOUNTER
R: [place]  Access Inpatient Bed Call Log 9/26/23 @ 9:10 AM   Intake has called facilities that have not updated the bed status within the last 12 hours.                                       [facility] is at capacity.                  [facility] is posting 0 beds. [phone].        [facility] is posting 0 beds. Negative covid required.                     [facility] is posting 0 beds. Negative covid required. [phone].  Per call @ 9:24 am to Marylou no beds available.   [place] [facility] posting 0 Beds            [facility] is posting 0 beds. [phone].   [facility] is posting 1 beds.  Vol only.  COVID test required.  No sexual offenders.  Call [phone].     [facility] is posting 0 beds.       [facility] is posting 0 beds. Low acuity only.              [facility] is posting 5 beds. LOW acuity ONLY. Mixed unit 12+. Negative covid- [phone].         [facility] has 0 beds posted. No aggression. Negative Covid. Low acuity.      [facility] is posting 0 beds. Negative covid. [phone].         [facility] is posting 0 bed. Low acuity only. Negative covid.             [phone].    *Closed UFN per call to Corie @ 9:51 AM   [facility] is posting 0 beds. Low acuity. No current aggression.         [facility] ([place]) is posting 0 bed available. Very low acuity.  Mood disorder only.  Negative covid.  [phone].        [facility] ([place]) is posting 4 beds. Low acuity only. Negative covid.  [phone].        [facility] is posting 1 bed. Low acuity. 72 HH hold             preferred. Negative covid required. [phone] Per call @ 9:43 am [facility] for unit coordinator.     [facility] in [place] is posting 6 beds.  Negative covid required.   Vol only, No history of aggression, violence, or assault. No sexual  offenders. No 72  holds. [phone].              [facility] is posting 9 beds. Negative covid required.  (Must have the cognitive ability to do programming. No aggressive or violent behavior or recent HX in the last 2 yrs. MH must be primary.) Always low acuity. [phone]. Per call @ 9:44 am to [facility] beds are available low acuity only.   [facility] has 0 beds posted. Negative covid required.  Low acuity only. Violence and aggression capped.  [phone]. Low acuity only.         [facility] is posting 0 bed. Low acuity, Negative covid required.  [phone].       [facility] is posting 5 beds. Negative covid required.  Vol only. Combined adolescent and adult unit. No aggressive or violent behavior. No registered sex offenders.  [phone]. Per call @ 9:45 am to [place] nurse is unavailable to callback for bed availability.   [place] posting 3 bed Negative covid required.   [phone]        [facility] is posting 2 beds. Negative covid. LOW acuity. (No lines, drains, or tubes, oxygen, CPAP, IV, etc.). [phone].   Per call @9:47 am to Mckayla 2 beds are available.   [facility] ([facility] is posting 2 beds. Negative covid. (No. lines, drains, or tubes, oxygen, CPAP, IV, etc.). [phone].  Per call @ 9:47 am to Adeola will have available beds later today.             Pt remains on the work list pending appropriate bed availability.

## 2023-09-26 NOTE — ED NOTES
IP MH Referral Acuity Rating Score (RARS)    LMHP complete at referral to IP MH, with DEC; and, daily while awaiting IP MH placement. Call score to PPS.  CRITERIA SCORING   New 72 HH and Involuntary for IP MH (not adolescent) 0/1   Boarding over 24 hours 1/1   Vulnerable adult at least 55+ with multiple co morbidities; or, Patient age 11 or under 0/1   Suicide ideation without relief of precipitating factors 1/1   Current plan for suicide 0/1   Current plan for homicide 0/1   Imminent risk or actual attempt to seriously harm another without relief of factors precipitating the attempt 0/1   Severe dysfunction in daily living (ex: complete neglect for self care, extreme disruption in vegetative function, extreme deterioration in social interactions) 1/1   Recent (last 2 weeks) or current physical aggression in the ED 0/1   Restraints or seclusion episode in ED 0/1   Verbal aggression, agitation, yelling, etc., while in the ED 0/1   Active psychosis with psychomotor agitation or catatonia 1/1   Need for constant or near constant redirection (from leaving, from others, etc).  1/1   Intrusive or disruptive behaviors 1/1   TOTAL Acuity Total Score: 6

## 2023-09-26 NOTE — ED NOTES
ED mental signout note    Remained calm and cooperative during my shift.  She did mention concern for a UTI and so urinalysis was done and this returned suggestive of UTI and she was started on Keflex.  This was scheduled 3 times daily for the next 5 days.    Remains on a 72-hour hold.      ICD-10-CM    1. Psychosis, unspecified psychosis type (H)  F29       2. Depression with anxiety  F41.8         MD Jarvis Faye Jerome Richard, MD  09/26/23 0150

## 2023-09-26 NOTE — PROGRESS NOTES
"Triage & Transition Services, Extended Care     Therapy Progress Note    Patient: Bria goes by \"Bria,\" uses they/them pronouns  Date of Service: September 26, 2023  Site of Service: [facility] ED06  Patient was seen in-person.     Presenting problem:   Bria is followed related to Long wait time for admission: 92+ hours . Please see initial DEC/[facility] Crisis Assessment completed by Roshan Orellana on 9/23/23 for complete assessment information. Notable concerns include: SI, psychosis.     Individuals Present: Bria & LAURA Ibrahim    Session start: 10:57am  Session end: 11:05am  Session duration in minutes: 8 minutes  Session number: 3  Anticipated number of sessions or this episode of care: 1-5  CPT utilized: not billable     Current Presentation:   Writer entered the patient's room, introduced self and explained role.  Patient was lying on the gurney with eyes closed, though did verbally respond.  RN reported patient had been sleeping most of the morning, they did wake to take medications.  Patient said they continue to feel sad and depressed, saying \"I just can't shake this\".  Patient's affect was flat, identified trigger as \"sex, that's what triggered this\".  Patient said they have a history of trauma and recent events triggered feelings of sadness and hopelessness.  Patient continues to endorse SI, though does not identify a plan.       Patient does acknowledge that their mother is their Guardian, and clarified preferred pronouns are \"they/them\".  Patient then closed their eyes and stopped responding, when writer asked if they needed more rest, they shook their head yes, briefly reviewed plan of care with the patient, they verbalized understanding and said they are still voluntary for treatment.      Called patient's Guardian/Mother- Zita Castro at [phone]  \"I'm not sure what would be helpful, she was doing really well until August 1st of last summer, she became homeless, went through a very destructive " "period with her homeless friends on the streets, learned how to survive, started getting in trouble with the police.  She was previously living as a male, she was  and doing well because she had someone taking care of her, they were living in [place], they , she came back to [place] and was homeless. She was diagnosed with Schizophrenia at 22 years old, I saw her yesterday, she said the voices were quieter and she didn't know what to do with that because it was too quiet, usually the voices are constant and very intense. She was supposed to have a second session with Denise to help her re-establish SSDI, but she's been in the hospital.  I've only been Guardian for a few weeks. Yesterday when I saw her, she was tearful and depressed.  She was in a rehab facility, but only for a few days, they want her stabilized with mental health before she can access CD treatment.  My goal is to get her back with an ACT team, she has done the best in the past when she was connected with ab ACT team.\"  Zita provider contact information for the patient's county , and said the patient is also working with a .        Called [facility] , Nahed Page,  [phone], who stated she is no longer working with the patient, through [facility] she assisted with making referrals, Denise is now working with the patient and they are likely in the process of being established with case management, Nahed said the case is closed on her end.       Mental Status Exam:   Appearance: dressed in hospital scrubs and fatigued   Attitude: somewhat cooperative  Eye Contact: poor   Mood: sad  and depressed  Affect: mood congruent  Speech: paucity of speech  Psychomotor Behavior: no evidence of tardive dyskinesia, dystonia, or tics  Thought Process:  goal oriented  Associations: no loose associations  Thought Content: passive suicidal ideation present  Insight: limited  Judgement: limited  Oriented " to: time, person, and place  Attention Span and Concentration: limited  Recent and Remote Memory: limited    Diagnosis:    Schizophrenia (H) F20.9    Suicidal ideation R45.851    Auditory hallucinations R44.0    Depression with anxiety F41.8       Therapeutic Intervention(s):   Provided active listening, unconditional positive regard, and validation.     Treatment Objective(s) Addressed:   The focus of this session was on rapport building, identifying and practicing coping strategies, assessing safety, and identifying additional supports.     Progress Towards Goals:   Patient reports no improvement in symptoms. Patient is not making progress towards treatment goals as evidenced by continued presentation with worsening depression, sadness, and hopelessness.       General Recommendations:   Continue to monitor for harm. Consider: Use clear and concise directions, too many words can be overwhelming, Provide the pt with options to provide a sense of control. Try to tell the pt what they can do instead of what they can't do, Verbally state expectations , and Be firm but gentle when redirecting    Plan:   Inpatient Mental Health: Writer continues to recommend Inpatient Mental Health Admission for continued safety and stabilization.  Patient minimally participated with writer.  Reported ongoing worsening depression, presents with a flat affect, states they are feeling sad and     Plan for Care reviewed with Assigned Medical Provider? Yes. Provider, Bud Jefferson MD/ RN- Lianet DONALD, response: understands and agrees with plan of care      LAURA Ibrahim   Licensed Mental Health Professional (LMHP), [facility]  [phone]

## 2023-09-27 ENCOUNTER — TELEPHONE (OUTPATIENT)
Dept: BEHAVIORAL HEALTH | Facility: CLINIC | Age: 43
End: 2023-09-27
Payer: COMMERCIAL

## 2023-09-27 ENCOUNTER — HOSPITAL ENCOUNTER (INPATIENT)
Facility: CLINIC | Age: 43
LOS: 8 days | Discharge: PSYCHIATRIC OR SUBSTANCE USE FACILITY | End: 2023-10-05
Attending: PSYCHIATRY & NEUROLOGY | Admitting: PSYCHIATRY & NEUROLOGY
Payer: MEDICAID

## 2023-09-27 VITALS
BODY MASS INDEX: 27.66 KG/M2 | DIASTOLIC BLOOD PRESSURE: 100 MMHG | HEART RATE: 110 BPM | SYSTOLIC BLOOD PRESSURE: 134 MMHG | RESPIRATION RATE: 18 BRPM | WEIGHT: 161.16 LBS | TEMPERATURE: 99.2 F | OXYGEN SATURATION: 97 %

## 2023-09-27 DIAGNOSIS — R68.2 DRY MOUTH: ICD-10-CM

## 2023-09-27 DIAGNOSIS — F17.200 NICOTINE USE DISORDER: ICD-10-CM

## 2023-09-27 DIAGNOSIS — F25.1 SCHIZOAFFECTIVE DISORDER, DEPRESSIVE TYPE (H): Primary | ICD-10-CM

## 2023-09-27 DIAGNOSIS — F90.0 ADHD (ATTENTION DEFICIT HYPERACTIVITY DISORDER), INATTENTIVE TYPE: ICD-10-CM

## 2023-09-27 DIAGNOSIS — R44.0 AUDITORY HALLUCINATIONS: ICD-10-CM

## 2023-09-27 LAB
ALBUMIN SERPL BCG-MCNC: 3.8 G/DL (ref 3.5–5.2)
ALP SERPL-CCNC: 94 U/L (ref 35–104)
ALT SERPL W P-5'-P-CCNC: 22 U/L (ref 0–50)
ANION GAP SERPL CALCULATED.3IONS-SCNC: 10 MMOL/L (ref 7–15)
AST SERPL W P-5'-P-CCNC: 22 U/L (ref 0–45)
BACTERIA UR CULT: ABNORMAL
BILIRUB SERPL-MCNC: 0.5 MG/DL
BUN SERPL-MCNC: 9.3 MG/DL (ref 6–20)
CALCIUM SERPL-MCNC: 9.2 MG/DL (ref 8.6–10)
CHLORIDE SERPL-SCNC: 101 MMOL/L (ref 98–107)
CREAT SERPL-MCNC: 0.95 MG/DL (ref 0.51–0.95)
DEPRECATED HCO3 PLAS-SCNC: 24 MMOL/L (ref 22–29)
EGFRCR SERPLBLD CKD-EPI 2021: 77 ML/MIN/1.73M2
ERYTHROCYTE [DISTWIDTH] IN BLOOD BY AUTOMATED COUNT: 16.3 % (ref 10–15)
GLUCOSE SERPL-MCNC: 101 MG/DL (ref 70–99)
HCT VFR BLD AUTO: 38.6 % (ref 35–47)
HGB BLD-MCNC: 12 G/DL (ref 11.7–15.7)
MCH RBC QN AUTO: 23.3 PG (ref 26.5–33)
MCHC RBC AUTO-ENTMCNC: 31.1 G/DL (ref 31.5–36.5)
MCV RBC AUTO: 75 FL (ref 78–100)
PLATELET # BLD AUTO: 239 10E3/UL (ref 150–450)
POTASSIUM SERPL-SCNC: 3.7 MMOL/L (ref 3.4–5.3)
PROT SERPL-MCNC: 6.9 G/DL (ref 6.4–8.3)
RBC # BLD AUTO: 5.16 10E6/UL (ref 3.8–5.2)
SARS-COV-2 RNA RESP QL NAA+PROBE: NEGATIVE
SODIUM SERPL-SCNC: 135 MMOL/L (ref 135–145)
WBC # BLD AUTO: 10.2 10E3/UL (ref 4–11)

## 2023-09-27 PROCEDURE — 250N000013 HC RX MED GY IP 250 OP 250 PS 637: Performed by: EMERGENCY MEDICINE

## 2023-09-27 PROCEDURE — 250N000013 HC RX MED GY IP 250 OP 250 PS 637

## 2023-09-27 PROCEDURE — 80053 COMPREHEN METABOLIC PANEL: CPT | Performed by: EMERGENCY MEDICINE

## 2023-09-27 PROCEDURE — 124N000002 HC R&B MH UMMC

## 2023-09-27 PROCEDURE — 36415 COLL VENOUS BLD VENIPUNCTURE: CPT | Performed by: EMERGENCY MEDICINE

## 2023-09-27 PROCEDURE — 87635 SARS-COV-2 COVID-19 AMP PRB: CPT | Performed by: EMERGENCY MEDICINE

## 2023-09-27 PROCEDURE — 99284 EMERGENCY DEPT VISIT MOD MDM: CPT | Performed by: NURSE PRACTITIONER

## 2023-09-27 PROCEDURE — 85027 COMPLETE CBC AUTOMATED: CPT | Performed by: EMERGENCY MEDICINE

## 2023-09-27 RX ORDER — ACETAMINOPHEN 325 MG/1
650 TABLET ORAL EVERY 4 HOURS PRN
Status: DISCONTINUED | OUTPATIENT
Start: 2023-09-27 | End: 2023-10-05 | Stop reason: HOSPADM

## 2023-09-27 RX ORDER — CEPHALEXIN 500 MG/1
500 CAPSULE ORAL EVERY 8 HOURS SCHEDULED
Status: COMPLETED | OUTPATIENT
Start: 2023-09-27 | End: 2023-09-30

## 2023-09-27 RX ORDER — OLANZAPINE 5 MG/1
5 TABLET ORAL AT BEDTIME
Status: DISCONTINUED | OUTPATIENT
Start: 2023-09-27 | End: 2023-09-27

## 2023-09-27 RX ORDER — MAGNESIUM HYDROXIDE/ALUMINUM HYDROXICE/SIMETHICONE 120; 1200; 1200 MG/30ML; MG/30ML; MG/30ML
30 SUSPENSION ORAL EVERY 4 HOURS PRN
Status: DISCONTINUED | OUTPATIENT
Start: 2023-09-27 | End: 2023-10-05 | Stop reason: HOSPADM

## 2023-09-27 RX ORDER — NICOTINE 21 MG/24HR
1 PATCH, TRANSDERMAL 24 HOURS TRANSDERMAL DAILY
Status: DISCONTINUED | OUTPATIENT
Start: 2023-09-27 | End: 2023-10-05 | Stop reason: HOSPADM

## 2023-09-27 RX ORDER — POLYETHYLENE GLYCOL 3350 17 G
2 POWDER IN PACKET (EA) ORAL
Status: DISCONTINUED | OUTPATIENT
Start: 2023-09-27 | End: 2023-10-05 | Stop reason: HOSPADM

## 2023-09-27 RX ORDER — OLANZAPINE 10 MG/1
10 TABLET ORAL 3 TIMES DAILY PRN
Status: DISCONTINUED | OUTPATIENT
Start: 2023-09-27 | End: 2023-10-05 | Stop reason: HOSPADM

## 2023-09-27 RX ORDER — QUETIAPINE 300 MG/1
300 TABLET, FILM COATED, EXTENDED RELEASE ORAL AT BEDTIME
Status: DISCONTINUED | OUTPATIENT
Start: 2023-09-27 | End: 2023-09-27

## 2023-09-27 RX ORDER — VENLAFAXINE HYDROCHLORIDE 75 MG/1
75 CAPSULE, EXTENDED RELEASE ORAL DAILY
Status: DISCONTINUED | OUTPATIENT
Start: 2023-09-28 | End: 2023-09-29

## 2023-09-27 RX ORDER — OLANZAPINE 10 MG/2ML
10 INJECTION, POWDER, FOR SOLUTION INTRAMUSCULAR 3 TIMES DAILY PRN
Status: DISCONTINUED | OUTPATIENT
Start: 2023-09-27 | End: 2023-10-05 | Stop reason: HOSPADM

## 2023-09-27 RX ORDER — QUETIAPINE FUMARATE 100 MG/1
200 TABLET, FILM COATED ORAL 2 TIMES DAILY PRN
Status: DISCONTINUED | OUTPATIENT
Start: 2023-09-27 | End: 2023-10-04 | Stop reason: DRUGHIGH

## 2023-09-27 RX ORDER — QUETIAPINE 300 MG/1
300 TABLET, FILM COATED, EXTENDED RELEASE ORAL AT BEDTIME
Status: DISCONTINUED | OUTPATIENT
Start: 2023-09-27 | End: 2023-10-02

## 2023-09-27 RX ORDER — OLANZAPINE 10 MG/1
10 TABLET ORAL EVERY EVENING
Status: DISCONTINUED | OUTPATIENT
Start: 2023-09-27 | End: 2023-09-27

## 2023-09-27 RX ORDER — DEXTROAMPHETAMINE SACCHARATE, AMPHETAMINE ASPARTATE MONOHYDRATE, DEXTROAMPHETAMINE SULFATE AND AMPHETAMINE SULFATE 3.75; 3.75; 3.75; 3.75 MG/1; MG/1; MG/1; MG/1
15 CAPSULE, EXTENDED RELEASE ORAL EVERY MORNING
Status: DISCONTINUED | OUTPATIENT
Start: 2023-09-28 | End: 2023-09-29

## 2023-09-27 RX ADMIN — QUETIAPINE FUMARATE 300 MG: 300 TABLET, EXTENDED RELEASE ORAL at 22:39

## 2023-09-27 RX ADMIN — OLANZAPINE 10 MG: 5 TABLET, ORALLY DISINTEGRATING ORAL at 02:27

## 2023-09-27 RX ADMIN — CEPHALEXIN 500 MG: 500 CAPSULE ORAL at 14:16

## 2023-09-27 RX ADMIN — CEPHALEXIN 500 MG: 500 CAPSULE ORAL at 22:39

## 2023-09-27 RX ADMIN — ACETAMINOPHEN 650 MG: 325 TABLET, FILM COATED ORAL at 14:16

## 2023-09-27 RX ADMIN — DEXTROAMPHETAMINE SACCHARATE, AMPHETAMINE ASPARTATE MONOHYDRATE, DEXTROAMPHETAMINE SULFATE, AND AMPHETAMINE SULFATE 15 MG: 3.75; 3.75; 3.75; 3.75 CAPSULE, EXTENDED RELEASE ORAL at 09:13

## 2023-09-27 RX ADMIN — CEPHALEXIN 500 MG: 500 CAPSULE ORAL at 09:13

## 2023-09-27 RX ADMIN — VENLAFAXINE HYDROCHLORIDE 75 MG: 75 CAPSULE, EXTENDED RELEASE ORAL at 09:13

## 2023-09-27 RX ADMIN — NICOTINE 1 PATCH: 21 PATCH, EXTENDED RELEASE TRANSDERMAL at 18:52

## 2023-09-27 ASSESSMENT — ACTIVITIES OF DAILY LIVING (ADL)
ADLS_ACUITY_SCORE: 28
FALL_HISTORY_WITHIN_LAST_SIX_MONTHS: NO
CONCENTRATING,_REMEMBERING_OR_MAKING_DECISIONS_DIFFICULTY: NO
ADLS_ACUITY_SCORE: 35
DIFFICULTY_COMMUNICATING: NO
ADLS_ACUITY_SCORE: 35
TOILETING_ISSUES: NO
ADLS_ACUITY_SCORE: 28
WALKING_OR_CLIMBING_STAIRS_DIFFICULTY: NO
DOING_ERRANDS_INDEPENDENTLY_DIFFICULTY: NO
ADLS_ACUITY_SCORE: 35
WEAR_GLASSES_OR_BLIND: NO
ADLS_ACUITY_SCORE: 35
ADLS_ACUITY_SCORE: 35
DRESSING/BATHING_DIFFICULTY: NO
ADLS_ACUITY_SCORE: 28
DIFFICULTY_EATING/SWALLOWING: NO
ADLS_ACUITY_SCORE: 45
HEARING_DIFFICULTY_OR_DEAF: NO
ADLS_ACUITY_SCORE: 35
CHANGE_IN_FUNCTIONAL_STATUS_SINCE_ONSET_OF_CURRENT_ILLNESS/INJURY: NO

## 2023-09-27 ASSESSMENT — LIFESTYLE VARIABLES
AUDIT-C TOTAL SCORE: 5
SKIP TO QUESTIONS 9-10: 0

## 2023-09-27 NOTE — CARE PLAN
09/27/23 1755   Patient Belongings   Patient Belongings locker;sent to security per site process   Patient Belongings Put in Hospital Secure Location (Security or Locker, etc.) cash/credit card;tote;shoes;money (see comment);cell phone/electronics;clothing   Belongings Search Yes   Clothing Search Yes   Second Staff Leslie [employer] Oneyda     Jj Backpack, Black Cardigan, Underwear,  Bag, 2 Phones (Iphone, YesGrapha), Shoes, Brown Tote Bag, Portable  ( Energizer), Shalini Doll, 3 Shirts, Mathieu Cross Body Bag, 6 Chargers, 3 Wall plugs, JBL Headphone case (no ear buds inside), Snacks ( Chips & Drink), Vape, Lighter, Gloves, Cap Hat, Beanie, Gum, Notebook, [place] Drivers License ID, 2 Portable chargers     Sent to Security Envelope # [id number]  $100 Cash  EBT Card Ending [phone]  [employer] Debit Ending 3048  Mastercard Debit Ending [phone]  Cash [place] Card Ending [phone]  Gift Card Ending [phone]  Mastercard Debit Card Ending [phone]      ..A               Admission:  I am responsible for any personal items that are not sent to the safe or pharmacy.  [facility] is not responsible for loss, theft or damage of any property in my possession.    Signature:  _________________________________ Date: _______  Time: _____                                              Staff Signature:  ____________________________ Date: ________  Time: _____      2nd Staff person, if patient is unable/unwilling to sign:    Signature: ________________________________ Date: ________  Time: _____     Discharge:  [facility] has returned all of my personal belongings:    Signature: _________________________________ Date: ________  Time: _____                                          Staff Signature:  ____________________________ Date: ________  Time: _____

## 2023-09-27 NOTE — PROGRESS NOTES
"     Triage & Transition Services, Extended Care      Therapy Progress Note     Patient: Bria goes by \"Bria,\" uses they/them pronouns  Date of Service: September 27, 2023  Site of Service: [facility] ED06  Patient was seen in-person.      Presenting problem:   Bria is followed related to Long wait time for admission: 92+ hours . Please see initial DEC/[facility] Crisis Assessment completed by Roshan Orellana on 9/23/23 for complete assessment information. Notable concerns include: SI, psychosis.      Individuals Present: Bria & LAURA Ibrahim    Session start: 10:55am  Session end: 11:05am  Session duration in minutes: 10 minutes  Session number: 3  Anticipated number of sessions or this episode of care: 1-5  CPT utilized: not billable      Current Presentation:   Writer entered the patient's room, introduced self and explained role.  Patient was lying on the gurney with eyes closed, they did sit up to meet with writer.  Patient said they feel somewhat better today, they said ultimately they said they have been sleeping a lot and feel slightly less depressed today.  They asked about IP admission, spoke about possible placement at [facility].  Patient said they had been there before, was familiar with what to expect with admission.  Patient said after they would like to get into an IRTS facility, they are ambivalent about CD treatment.                    Mental Status Exam:   Appearance: dressed in hospital scrubs and fatigued   Attitude: more cooperative  Eye Contact: poor   Mood: better, still reports feeling depressed  Affect: mood congruent  Speech: paucity of speech  Psychomotor Behavior: no evidence of tardive dyskinesia, dystonia, or tics  Thought Process:  goal oriented  Associations: no loose associations  Thought Content: passive suicidal ideation present  Insight: limited  Judgement: limited  Oriented to: time, person, and place  Attention Span and Concentration: fair  Recent and Remote Memory: limited   "   Diagnosis:    Schizophrenia (H) F20.9    Suicidal ideation R45.851    Auditory hallucinations R44.0    Depression with anxiety F41.8         Therapeutic Intervention(s):   Provided active listening, unconditional positive regard, and validation.      Treatment Objective(s) Addressed:   The focus of this session was on rapport building, identifying and practicing coping strategies, assessing safety, and identifying additional supports.      Progress Towards Goals:   Patient reports some improvement in symptoms. Patient is making progress towards treatment goals as evidenced by engaging with writer, reporting some improvement in symptoms         General Recommendations:   Continue to monitor for harm. Consider: Use clear and concise directions, too many words can be overwhelming, Provide the pt with options to provide a sense of control. Try to tell the pt what they can do instead of what they can't do, Verbally state expectations , and Be firm but gentle when redirecting     Plan:   Inpatient Mental Health: Writer continues to recommend Inpatient Mental Health Admission for continued safety and stabilization.  Patient reports some improvement in symptoms, though still endorses depression and need for stabilization.       Plan for Care reviewed with Assigned Medical Provider? Yes. Provider, BETHANY- Kinga MUKHERJEE, response: understands and agrees with plan of care                 LAURA Ibrahim   Licensed Mental Health Professional (LMHP), [facility]  [phone]

## 2023-09-27 NOTE — PHARMACY-ADMISSION MEDICATION HISTORY
"Admission Medication History Completed by Pharmacy    Please refer to pharmacy progress note on 9/22/23, \"Pharmacy-Admission Medication History\" under previous encounter at [facility] Emergency Department for information regarding prior to admission medications.    Katya Theodoer, PharmD    "

## 2023-09-27 NOTE — ED NOTES
RN ED Mental Health Handoff Note    Voluntary Voluntary. Mother is Guardian and wants patient to be admitted.     Does patient require 1:1? No    Hold and rights been given and documented for patient: Yes    Is the patient in  scrubs? Yes    Has the patient been searched? Yes    Is the 15 minute observation tool up to date? Yes    Was patient issued a welcome folder? Yes    Room check completed this shift: Yes    PSS3 and [place] Assessment/Reassessment this shift:    PSS-3      Date and Time Over the past 2 weeks have you felt down, depressed, or hopeless? Over the past 2 weeks have you had thoughts of killing yourself? Have you ever attempted to kill yourself? When did this last happen? User   09/22/23 1410 yes no no -- LESA MEYER-[facility] ([place])      Date and Time Q1 Wished to be Dead (Past Month) Q2 Suicidal Thoughts (Past Month) Q3 Suicidal Thought Method Q4 Suicidal Intent without Specific Plan Q5 Suicide Intent with Specific Plan Q6 Suicide Behavior (Lifetime) Within the Past 3 Months? RETIRED: Level of Risk per Screen Screening Not Complete User   09/26/23 1211 yes yes yes yes yes no -- -- -- MAL   09/22/23 1744 yes yes yes no yes -- -- -- -- BEB   09/22/23 1613 yes yes yes no yes no -- -- -- EO   09/22/23 1502 -- -- -- -- -- -- -- -- Refuses to answer [facility]            Behavioral status of patient: Green    Code 21 called this shift? No    Use of restraints/seclusion this shift? No    Most recent vital signs:      BP: (!) 146/105 Pulse: 115   Resp: 18 SpO2: 98 % O2 Device: None (Room air)      Medications:  Scheduled medication compliance? Yes    PRN Meds administered this shift? No    Medications   acetaminophen (TYLENOL) tablet 650 mg (650 mg Oral $Given 9/26/23 1728)   ibuprofen (ADVIL/MOTRIN) tablet 600 mg (has no administration in time range)   LORazepam (ATIVAN) tablet 1 mg (1 mg Oral $Given 9/25/23 0817)   OLANZapine zydis (zyPREXA) ODT tab 10 mg (10 mg Oral $Given 9/27/23 0227)   QUEtiapine ER  (SEROquel XR) 24 hr tablet 300 mg (300 mg Oral $Given 9/26/23 2217)   venlafaxine (EFFEXOR XR) 24 hr capsule 75 mg (75 mg Oral $Given 9/27/23 0913)   amphetamine-dextroamphetamine (ADDERALL XR) ER cap 15 mg (15 mg Oral $Given 9/27/23 0913)   QUEtiapine (SEROquel) tablet 200 mg (200 mg Oral $Given 9/24/23 1623)   cephALEXin (KEFLEX) capsule 500 mg (500 mg Oral $Given 9/27/23 0913)   OLANZapine zydis (zyPREXA) ODT tab 5 mg (5 mg Oral $Given 9/22/23 1416)   LORazepam (ATIVAN) tablet 1 mg (1 mg Oral $Given 9/24/23 1247)   OLANZapine zydis (zyPREXA) ODT tab 5 mg (5 mg Oral Not Given 9/25/23 1514)   LORazepam (ATIVAN) tablet 1 mg (1 mg Oral $Given 9/25/23 1518)         ADLs    Meal Provided this shift? Yes    Hygiene items provided? Yes - offered     ADLs completed? No - pt refused.     Date of last shower: unknown    Any significant events this shift? No    Any information that would be helpful in caring for this patient?  Pt is awaiting [facility] transfer. Report has been called, waiting on bed to be ready.     Family present/updated? No    Location of patient's belongings: DEC office.     Critical Care Minutes:  Does the patient need critical care minutes documented? No

## 2023-09-27 NOTE — ED NOTES
[facility] updated on covid test results. [facility] staff updated writer on plan of transfer. [facility] room still occupied at this time.

## 2023-09-27 NOTE — TELEPHONE ENCOUNTER
2:55 PM: Intake called [facility] to check the status of Pt in queue and was informed that Pt is on their way.
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
## 2023-09-27 NOTE — TELEPHONE ENCOUNTER
R: Patient cleared and ready for behavioral bed placement: Yes    3:10am Intake received a call from the resident accepting this pt for [facility].     3:35am Intake called [facility] and provided disposition to charge nurse. Nurse report: Charge will call.     3:38am Intake called [facility] [place] and [place] [facility] [place]

## 2023-09-27 NOTE — ED NOTES
RN ED Mental Health Handoff Note    Voluntary. Mother is Guardian and wants patient to be admitted.    Does patient require 1:1? No    Hold and rights been given and documented for patient: Yes    Is the patient in  scrubs? Yes    Has the patient been searched? Yes    Is the 15 minute observation tool up to date? Yes    Was patient issued a welcome folder? Yes    Room check completed this shift: Yes    PSS3 and [place] Assessment/Reassessment this shift:    PSS-3      Date and Time Over the past 2 weeks have you felt down, depressed, or hopeless? Over the past 2 weeks have you had thoughts of killing yourself? Have you ever attempted to kill yourself? When did this last happen? User   09/22/23 1410 yes no no -- LESA MEYER-[facility] ([place])      Date and Time Q1 Wished to be Dead (Past Month) Q2 Suicidal Thoughts (Past Month) Q3 Suicidal Thought Method Q4 Suicidal Intent without Specific Plan Q5 Suicide Intent with Specific Plan Q6 Suicide Behavior (Lifetime) Within the Past 3 Months? RETIRED: Level of Risk per Screen Screening Not Complete User   09/26/23 1211 yes yes yes yes yes no -- -- -- MAL   09/22/23 1744 yes yes yes no yes -- -- -- -- BEB   09/22/23 1613 yes yes yes no yes no -- -- -- EO   09/22/23 1502 -- -- -- -- -- -- -- -- Refuses to answer [facility]            Behavioral status of patient: Green    Code 21 called this shift? No    Use of restraints/seclusion this shift? No    Most recent vital signs:  Temp: 99.2  F (37.3  C) Temp src: Oral BP: (!) 142/99 Pulse: 72   Resp: 16 SpO2: 98 % O2 Device: None (Room air)      Medications:  Scheduled medication compliance? Yes    PRN Meds administered this shift? Yes    Medications   acetaminophen (TYLENOL) tablet 650 mg (650 mg Oral $Given 9/26/23 1728)   ibuprofen (ADVIL/MOTRIN) tablet 600 mg (has no administration in time range)   LORazepam (ATIVAN) tablet 1 mg (1 mg Oral $Given 9/25/23 0817)   OLANZapine zydis (zyPREXA) ODT tab 10 mg (10 mg Oral $Given 9/27/23  0227)   QUEtiapine ER (SEROquel XR) 24 hr tablet 300 mg (300 mg Oral $Given 9/26/23 2217)   venlafaxine (EFFEXOR XR) 24 hr capsule 75 mg (75 mg Oral $Given 9/26/23 1041)   amphetamine-dextroamphetamine (ADDERALL XR) ER cap 15 mg (15 mg Oral $Given 9/26/23 1041)   QUEtiapine (SEROquel) tablet 200 mg (200 mg Oral $Given 9/24/23 1623)   cephALEXin (KEFLEX) capsule 500 mg (500 mg Oral $Given 9/26/23 2217)   OLANZapine zydis (zyPREXA) ODT tab 5 mg (5 mg Oral $Given 9/22/23 1416)   LORazepam (ATIVAN) tablet 1 mg (1 mg Oral $Given 9/24/23 1247)   OLANZapine zydis (zyPREXA) ODT tab 5 mg (5 mg Oral Not Given 9/25/23 1514)   LORazepam (ATIVAN) tablet 1 mg (1 mg Oral $Given 9/25/23 1518)         ADLs    Meal Provided this shift? Yes    Hygiene items provided? Yes    ADLs completed? Yes    Date of last shower: unknown    Any significant events this shift? No    Any information that would be helpful in caring for this patient?  Patient has been accepted at [facility], they will call for report when they are ready.    Family present/updated? Yes    Location of patient's belongings: locker    Critical Care Minutes:  Does the patient need critical care minutes documented? No

## 2023-09-27 NOTE — PLAN OF CARE
"  Problem: Adult Behavioral Health Plan of Care  Goal: Plan of Care Review  Outcome: Progressing     Problem: Anxiety  Goal: Anxiety Reduction or Resolution  Outcome: Progressing     Problem: Psychotic Signs/Symptoms  Goal: Improved Behavioral Control (Psychotic Signs/Symptoms)  Outcome: Progressing   Goal Outcome Evaluation:      Patient is a 43 yr old Them/They presents to the unit @ 1455 via EMS. Patient has a history of   schizoaffective disorder, borderline personality disorder, ADHD, depression, OCD, and body dysmorphia disorder. Patient was presented at the ED with worsening psychosocial stress, suicidal ideation, anxiety, and behavior change.   On assessment, patient is mildly guarded, cooperative, and pleasant towards staff. Patient is funny and relax, but is nevertheless anxious and endorses anxiety of 6/10 and SI with a plan to jump out of a moving vehicle.   When asked what could be done to mitigate anxiety and SI thoughts, patient stated   \" Lorazepam or benzos is what calms me down. I Know you will give me that.\"  Writer advises patient to address medication concerns with the psychiatrists.   Mentation is clear and patient is alert and oriented x4. Patient verbalizes needs respectfully and politely and contracts for safety. Appetite is good, evidence by patient eating 100% of dinner. No skin rashes or bruises present during admission  and patient has an understanding to insight to situation and environment. Speech is clear and constructive and no psychotic behavior present. BP and heart rate are mildly elevated(see flow sheet), but patient is calm and engage. Will continue to monitor and encourage patient.  "
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
## 2023-09-27 NOTE — ED NOTES
Patient called and let RN know that she was currently have suicidal thoughts and would like a PRN medication at this time. Zyprexa given.

## 2023-09-27 NOTE — ED NOTES
VSS. HR: 115. Pt verbalizes anxiety, otherwise calm, cooperative at this time. Meal tray delivered. Pt tolerated AM medications. Pt reports she slept well this AM after Zyprexa given at 0227. Patient ambulates to bathroom independently. Updated patient on awaiting transfer to [facility]. Patient verbalized understanding.

## 2023-09-28 PROCEDURE — 124N000002 HC R&B MH UMMC

## 2023-09-28 PROCEDURE — 250N000013 HC RX MED GY IP 250 OP 250 PS 637

## 2023-09-28 PROCEDURE — 90853 GROUP PSYCHOTHERAPY: CPT

## 2023-09-28 PROCEDURE — 99223 1ST HOSP IP/OBS HIGH 75: CPT | Mod: AI | Performed by: PSYCHIATRY & NEUROLOGY

## 2023-09-28 RX ADMIN — ACETAMINOPHEN, ASPIRIN, CAFFEINE 1 TABLET: 250; 65; 250 TABLET, FILM COATED ORAL at 15:42

## 2023-09-28 RX ADMIN — CEPHALEXIN 500 MG: 500 CAPSULE ORAL at 06:13

## 2023-09-28 RX ADMIN — ACETAMINOPHEN 650 MG: 325 TABLET ORAL at 01:01

## 2023-09-28 RX ADMIN — QUETIAPINE FUMARATE 300 MG: 300 TABLET, EXTENDED RELEASE ORAL at 21:13

## 2023-09-28 RX ADMIN — CEPHALEXIN 500 MG: 500 CAPSULE ORAL at 21:13

## 2023-09-28 RX ADMIN — NICOTINE POLACRILEX 2 MG: 2 LOZENGE ORAL at 21:14

## 2023-09-28 RX ADMIN — VENLAFAXINE HYDROCHLORIDE 75 MG: 75 CAPSULE, EXTENDED RELEASE ORAL at 08:59

## 2023-09-28 RX ADMIN — NICOTINE 1 PATCH: 21 PATCH, EXTENDED RELEASE TRANSDERMAL at 09:00

## 2023-09-28 RX ADMIN — DEXTROAMPHETAMINE SULFATE, DEXTROAMPHETAMINE SACCHARATE, AMPHETAMINE SULFATE AND AMPHETAMINE ASPARTATE 15 MG: 3.75; 3.75; 3.75; 3.75 CAPSULE, EXTENDED RELEASE ORAL at 08:59

## 2023-09-28 RX ADMIN — CEPHALEXIN 500 MG: 500 CAPSULE ORAL at 13:21

## 2023-09-28 ASSESSMENT — ACTIVITIES OF DAILY LIVING (ADL)
ADLS_ACUITY_SCORE: 28
ADLS_ACUITY_SCORE: 28
HYGIENE/GROOMING: INDEPENDENT
ADLS_ACUITY_SCORE: 28
ADLS_ACUITY_SCORE: 28
DRESS: INDEPENDENT
ADLS_ACUITY_SCORE: 28
DRESS: INDEPENDENT
ORAL_HYGIENE: INDEPENDENT
ADLS_ACUITY_SCORE: 28
HYGIENE/GROOMING: INDEPENDENT
ADLS_ACUITY_SCORE: 28
ORAL_HYGIENE: INDEPENDENT
ADLS_ACUITY_SCORE: 28
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
## 2023-09-28 NOTE — PLAN OF CARE
"Reports mood as \"okay\".  Endorses depression 6/10 and anxiety  8/10.  Requested medication for anxiety, offered PRN Seroquel, patient declined citing that it makes her too sleepy. Has been visible in the milieu, social with select peers.  Reports intermittent , passive suicidal thoughts.  Affect is flat, brightens with interactions.  Has been calm, cooperative and pleasant, no noted issues with intrusiveness or poor boundaries.   C/o blurred vision since starting on Seroquel.     Problem: Adult Behavioral Health Plan of Care  Goal: Plan of Care Review  Outcome: Not Progressing  Flowsheets (Taken 9/28/2023 3069)  Patient Agreement with Plan of Care: agrees  Goal: Patient-Specific Goal (Individualization)  Description: You can add care plan individualizations to a care plan. Examples of Individualization might be:  \"Parent requests to be called daily at 9am for status\", \"I have a hard time hearing out of my right ear\", or \"Do not touch me to wake me up as it startles me\".  Outcome: Not Progressing  Goal: Individualized Daily Interaction Plan (IDIP)  Outcome: Not Progressing  Goal: Adheres to Safety Considerations for Self and Others  Outcome: Not Progressing  Goal: Absence of New-Onset Illness or Injury  Outcome: Not Progressing  Goal: Optimized Coping Skills in Response to Life Stressors  Outcome: Not Progressing  Goal: Develops/Participates in Therapeutic [place] to Support Successful Transition  Outcome: Not Progressing  Intervention: Foster Therapeutic [place]  Recent Flowsheet Documentation  Taken 9/28/2023 5662 by Darcy Marshall RN  Trust Relationship/Rapport:   care explained   empathic listening provided     Problem: Sleep Disturbance  Goal: Adequate Sleep/Rest  Outcome: Not Progressing     Problem: Anxiety  Goal: Anxiety Reduction or Resolution  Outcome: Not Progressing     Problem: Depression  Goal: Improved Mood  Outcome: Not Progressing     Problem: Pain Acute  Goal: Optimal Pain Control and " Function  Outcome: Not Progressing     Problem: Psychotic Signs/Symptoms  Goal: Improved Behavioral Control (Psychotic Signs/Symptoms)  Outcome: Not Progressing  Goal: Optimal Cognitive Function (Psychotic Signs/Symptoms)  Outcome: Not Progressing  Intervention: Support and Promote Cognitive Ability  Recent Flowsheet Documentation  Taken 9/28/2023 1354 by Darcy Marshall RN  Trust Relationship/Rapport:   care explained   empathic listening provided  Goal: Increased Participation and Engagement (Psychotic Signs/Symptoms)  Outcome: Not Progressing  Goal: Improved Mood Symptoms  Outcome: Not Progressing  Goal: Improved Psychomotor Symptoms (Psychotic Signs/Symptoms)  Outcome: Not Progressing  Goal: Decreased Sensory Symptoms (Psychotic Signs/Symptoms)  Outcome: Not Progressing  Goal: Improved Sleep (Psychotic Signs/Symptoms)  Outcome: Not Progressing  Goal: Enhanced Social, Occupational or Functional Skills (Psychotic Signs/Symptoms)  Outcome: Not Progressing  Intervention: Promote Social, Occupational and Functional Ability  Recent Flowsheet Documentation  Taken 9/28/2023 1354 by Darcy Marshall RN  Trust Relationship/Rapport:   care explained   empathic listening provided     Problem: Suicidal Behavior  Goal: Suicidal Behavior is Absent or Managed  Outcome: Not Progressing   Goal Outcome Evaluation:    Plan of Care Reviewed With: patient
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
## 2023-09-28 NOTE — PLAN OF CARE
Team Note Due:  Thursday    Assessment/Intervention/Current Symtoms and Care Coordination:  Chart review and met with team, discussed pt progress, symptomology, and response to treatment.  Discussed the discharge plan and any potential impediments to discharge.    - Writer met with patient. Writer introduced herself and explained her role. Writer discussed with patient options for aftercare placement. Pt recognized recently abusing substances to cope with her mental health but also stated that she does not needed CD tx, she would prefer IRTS. Patient also reported working with a  and gave this writer authorization to contact her county CM.     - Writer called pt's CM, Joellen Page through [facility], [phone]. CM informed this writer that pt's case was closed but it could reopened if a new DA is sent to fax [phone].        Discharge Plan or Goal:  Residential treatment, likely IRTS or Board and Lodging      Barriers to Discharge:  Admitted for SI and substance use. Needs clinical stabilization and medication management.      Referral Status:  None      Legal Status:  Voluntary/Has legal guardian    County: [place]     Contacts:  Joellen Page through [facility], [phone]       Upcoming Meetings and Dates/Important Information and next steps:  [facility] at [phone]

## 2023-09-28 NOTE — PROGRESS NOTES
BEH IP Unit Acuity Rating Score (UARS)   Acuity Rating for [facility] currently located on 4AW as of 6/22/2023.   expanded to stations 20N and 22N as of 9/20/2023  Patient is given one point for every criteria they meet.    CRITERIA SCORING   On a 72 hour hold, court hold, committed, stay of commitment, or revocation 0    Patient LOS on BEH unit exceeds 20 days 0  LOS: 1   Patient under guardianship, 55+, otherwise medically complex, or under age 11 1   Suicide ideation without relief of precipitating factors 1   Current plan for suicide 0   Current plan for homicide 0   Imminent risk or actual attempt to seriously harm another without relief of factors precipitating the attempt 0   Severe dysfunction in daily living (ex: complete neglect for self care, extreme disruption in vegetative function, extreme deterioration in social interactions) 1   Recent (last 7 days) or current physical aggression in the ED or on unit 0   Restraints or seclusion episode in past 72 hours 0   Recent (last 7 days) or current verbal aggression, agitation, yelling, etc., while in the ED or unit 0   Active psychosis 1   Need for constant or near constant redirection (from leaving, from others, etc).  1   Intrusive or disruptive behaviors 1   TOTAL 6

## 2023-09-28 NOTE — CARE PLAN
BEH Occupational Therapy Group Intervention Note     09/28/23 1435   Group Therapy Session   Group Attendance attended group session   Time Session Began 1115   Time Session Ended 1400   Total Time (minutes) 30 (no charge)   Total # Attendees 7   Group Type task skill;life skill   Group Topic Covered coping skills/lifestyle management;relapse prevention;leisure exploration/use of leisure time   Group Session Detail Clinic - coping skill exploration, creative expression within personally meaningful activities, and observation of social, cognitive, and kinesthetic performance skills   Patient Response/Contribution cooperative with task;did not discuss personal experience   Patient Participation Detail Overall congruent affect yet presents with a somewhat edgy undertone. IND to select a task, gather materials and sequence to completion although some follow-through appeared to be sporadic and random in nature. Was heard with ~2 loud short bursts of laughter to self (unsure if this was in response to internal stimuli); able to refocus independently. Worked at a quick pace. Excused self early w/o return.      Selina Harrison OT on 9/28/2023 at 2:36 PM

## 2023-09-28 NOTE — PLAN OF CARE
Problem: Sleep Disturbance  Goal: Adequate Sleep/Rest  Outcome: Progressing    Pt appeared to have slept for 6.5 hours. PRN tylenol was given for generalized body  pain rated 5/10. PRN medication was effective , as there was no further complain of pain or discomfort. Pt is on SIO, 1:1 for severe intrusiveness. Staff will continue to offer support to pt.

## 2023-09-28 NOTE — PROVIDER NOTIFICATION
09/28/23 1219   Individualization/Patient Specific Goals   Patient Personal Strengths expressive of emotions;expressive of needs;humor;resilient;resourceful   Patient Vulnerabilities history of unsuccessful treatment;housing insecurity;lacks insight into illness;legal concerns;limited social skills;poor impulse control;substance abuse/addiction;traumatic event   Anxieties, Fears or Concerns Worries about medications.   Interprofessional Rounds   Summary Pt just arrived to the unit. Pt is experiencing paranoia, AH.   Participants nursing;pharmacy;;CTC;psychiatrist   Behavioral Team Discussion   Participants Damian, Psychiatry, Nursing CARL Monahan, Pharm Sunil DOWLING Med students   Progress Pt just arrived. Pt is experiencing paranoia and AH. Pt is requesting medication review.   Anticipated length of stay 7-10 days   Continued Stay Criteria/Rationale Stabilization of symptoms, medication management.   Precautions Per unit protocol   Plan Stabilize symptoms, med management.   Rationale for change in precautions or plan n/a   Safety Plan Per unit protocol.   Anticipated Discharge Disposition group home;[facility]     PRECAUTIONS AND SAFETY    Behavioral Orders   Procedures    Code 1 - Restrict to Unit    Elopement precautions    Routine Programming     As clinically indicated    Status 15     Every 15 minutes.    Status Individual Observation     Patient SIO status reviewed with team/RN.  Please also refer to RN/team documentation for add'l detail.    -SIO staff to monitor following which have contributed to patient being on SIO:  History of intrusiveness, overt friendlyness that can be uncomfortable. Asked per RN to have 24hrs of SIO to assess need, MD in agreement.   -Possible interventions SIO staff could use to support patient's treatment progress:  Redirection, reminder of boundaries  -When following observed, team will review discontinuation of SIO:  Will assess for  24 hours and discern whether SIO is necessary.     Order Specific Question:   CONTINUOUS 24 hours / day     Answer:   Other     Order Specific Question:   Specify distance     Answer:   10     Order Specific Question:   Indications for SIO     Answer:   Severe intrusiveness    Suicide precautions     Patients on Suicide Precautions should have a Combination Diet ordered that includes a Diet selection(s) AND a Behavioral Tray selection for Safe Tray - with utensils, or Safe Tray - NO utensils

## 2023-09-28 NOTE — DISCHARGE INSTRUCTIONS
Behavioral Discharge Planning and Instructions    Summary: You were admitted on 9/27/2023  due to  psychosis .  You were treated by Jayme Cao MD and discharged on 10/5/2023 from [facility] to [facility]    Main Diagnosis: Schizoaffective disorder, borderline personality disorder, ADHD, depression, OCD, body dysmorphia disorder     Commitment:  Pt is voluntary.     Health Care Follow-up:     [facility] admission date/time/location: [facility] on 10/5/2023 at 10:00 AM.   [address] [place] [address]  [phone]        Information will be faxed to your outpatient providers to ensure a healthy continuity of care for you.     Attend all scheduled appointments with your outpatient providers. Call at least 24 hours in advance if you need to reschedule an appointment to ensure continued access to your outpatient providers.     Major Treatments, Procedures and Findings:  You were provided with: a psychiatric assessment, assessed for medical stability, medication evaluation and/or management, group therapy, individual therapy, CD evaluation/assessment, milieu management, and medical interventions    Symptoms to Report: feeling more aggressive, increased confusion, losing more sleep, mood getting worse, or thoughts of suicide    Early warning signs can include: increased depression or anxiety sleep disturbances increased thoughts or behaviors of suicide or self-harm     Safety and Wellness:  Take all medicines as directed.  Make no changes unless your doctor suggests them.      Follow treatment recommendations.  Refrain from alcohol and non-prescribed drugs.  Ask your support system to help you reduce your access to items that could harm yourself or others. Items could include:  Firearms  Medicines (both prescribed and over-the-counter)  Knives and other sharp objects  Ropes and like materials  Car keys  If there is a concern for safety, call 911. If there is a concern for safety, call  "911.    Resources:   [place] [employer] (www.[place].ash[place].org): 335.918.5811 or 735-549-0969.  Self- Management and Recovery Training., byUs-- Toll free: 452.911.3736  www.[employer]  [facility] Crisis (COPE) Response - Adult 449 933-5282  Text 4 Life: txt \"LIFE\" to 12914 for immediate support and crisis intervention  Crisis text line: Text \"[place]\" to 162272. Free, confidential, 24/7.       [facility] ([place] [employer]ness) improves the lives of children and adults with mental illnesses and their families by providing free classes on mental illnesses and support groups for adults with mental illnesses, parents and family members. For more information: Phone: 733.250.9907 Toll free: 5-990-EZBQ-HELPS Website: www.[employer].StarMobilehttp://www.[facility].[email or web address]/      General Medication Instructions:   See your medication sheet(s) for instructions.   Take all medicines as directed.  Make no changes unless your doctor suggests them.   Go to all your doctor visits.  Be sure to have all your required lab tests. This way, your medicines can be refilled on time.  Do not use any drugs not prescribed by your doctor.  Avoid alcohol.    Advance Directives:   Scanned document on file with [facility]? Minor-N/A  Is document scanned? Minor-N/A  Honoring Choices Your Rights Handout: Minor - N/A  Was more information offered? Materials given    The Treatment team has appreciated the opportunity to work with you. If you have any questions or concerns about your recent admission, you can contact the unit which can receive your call 24 hours a day, 7 days a week. They will be able to get in touch with a Provider if needed. The unit number is [phone] .   "

## 2023-09-29 PROCEDURE — 124N000002 HC R&B MH UMMC

## 2023-09-29 PROCEDURE — 250N000013 HC RX MED GY IP 250 OP 250 PS 637

## 2023-09-29 PROCEDURE — 99232 SBSQ HOSP IP/OBS MODERATE 35: CPT | Performed by: PSYCHIATRY & NEUROLOGY

## 2023-09-29 PROCEDURE — 250N000013 HC RX MED GY IP 250 OP 250 PS 637: Performed by: PSYCHIATRY & NEUROLOGY

## 2023-09-29 PROCEDURE — H2032 ACTIVITY THERAPY, PER 15 MIN: HCPCS

## 2023-09-29 RX ORDER — METHYLPHENIDATE HYDROCHLORIDE 27 MG/1
27 TABLET ORAL DAILY
Status: DISCONTINUED | OUTPATIENT
Start: 2023-09-30 | End: 2023-10-02

## 2023-09-29 RX ORDER — SALIVA STIMULANT COMB. NO.3
1 SPRAY, NON-AEROSOL (ML) MUCOUS MEMBRANE 4 TIMES DAILY PRN
Status: DISCONTINUED | OUTPATIENT
Start: 2023-09-29 | End: 2023-10-05 | Stop reason: HOSPADM

## 2023-09-29 RX ORDER — VENLAFAXINE HYDROCHLORIDE 37.5 MG/1
37.5 CAPSULE, EXTENDED RELEASE ORAL DAILY
Status: COMPLETED | OUTPATIENT
Start: 2023-09-29 | End: 2023-09-29

## 2023-09-29 RX ADMIN — NICOTINE POLACRILEX 2 MG: 2 LOZENGE ORAL at 22:54

## 2023-09-29 RX ADMIN — CEPHALEXIN 500 MG: 500 CAPSULE ORAL at 13:32

## 2023-09-29 RX ADMIN — CEPHALEXIN 500 MG: 500 CAPSULE ORAL at 21:15

## 2023-09-29 RX ADMIN — NICOTINE POLACRILEX 2 MG: 2 GUM, CHEWING BUCCAL at 18:44

## 2023-09-29 RX ADMIN — NICOTINE 1 PATCH: 21 PATCH, EXTENDED RELEASE TRANSDERMAL at 13:30

## 2023-09-29 RX ADMIN — DEXTROAMPHETAMINE SULFATE, DEXTROAMPHETAMINE SACCHARATE, AMPHETAMINE SULFATE AND AMPHETAMINE ASPARTATE 15 MG: 3.75; 3.75; 3.75; 3.75 CAPSULE, EXTENDED RELEASE ORAL at 11:53

## 2023-09-29 RX ADMIN — VENLAFAXINE HYDROCHLORIDE 37.5 MG: 37.5 CAPSULE, EXTENDED RELEASE ORAL at 13:32

## 2023-09-29 RX ADMIN — Medication 1 SPRAY: at 17:12

## 2023-09-29 RX ADMIN — NICOTINE POLACRILEX 2 MG: 2 LOZENGE ORAL at 17:11

## 2023-09-29 RX ADMIN — Medication 1 SPRAY: at 14:57

## 2023-09-29 RX ADMIN — NICOTINE POLACRILEX 2 MG: 2 LOZENGE ORAL at 19:50

## 2023-09-29 RX ADMIN — NICOTINE POLACRILEX 2 MG: 2 LOZENGE ORAL at 12:50

## 2023-09-29 RX ADMIN — CEPHALEXIN 500 MG: 500 CAPSULE ORAL at 06:56

## 2023-09-29 RX ADMIN — NICOTINE POLACRILEX 2 MG: 2 LOZENGE ORAL at 14:33

## 2023-09-29 ASSESSMENT — ACTIVITIES OF DAILY LIVING (ADL)
ADLS_ACUITY_SCORE: 28
ADLS_ACUITY_SCORE: 28
ORAL_HYGIENE: INDEPENDENT
ADLS_ACUITY_SCORE: 28
ADLS_ACUITY_SCORE: 28
ORAL_HYGIENE: INDEPENDENT
ADLS_ACUITY_SCORE: 28
HYGIENE/GROOMING: INDEPENDENT
ADLS_ACUITY_SCORE: 28
DRESS: INDEPENDENT
ADLS_ACUITY_SCORE: 28
HYGIENE/GROOMING: INDEPENDENT
ADLS_ACUITY_SCORE: 28
DRESS: INDEPENDENT
ADLS_ACUITY_SCORE: 28

## 2023-09-29 NOTE — PROGRESS NOTES
BEH IP Unit Acuity Rating Score (UARS)   Acuity Rating for Station 32N currently located on 4AW as of 6/22/2023.   expanded to stations 20N and 22N as of 9/20/2023  Patient is given one point for every criteria they meet.    CRITERIA SCORING   On a 72 hour hold, court hold, committed, stay of commitment, or revocation 0    Patient LOS on BEH unit exceeds 20 days 0  LOS: 2   Patient under guardianship, 55+, otherwise medically complex, or under age 11 1   Suicide ideation without relief of precipitating factors 1   Current plan for suicide 0   Current plan for homicide 0   Imminent risk or actual attempt to seriously harm another without relief of factors precipitating the attempt 0   Severe dysfunction in daily living (ex: complete neglect for self care, extreme disruption in vegetative function, extreme deterioration in social interactions) 1   Recent (last 7 days) or current physical aggression in the ED or on unit 0   Restraints or seclusion episode in past 72 hours 0   Recent (last 7 days) or current verbal aggression, agitation, yelling, etc., while in the ED or unit 0   Active psychosis 1   Need for constant or near constant redirection (from leaving, from others, etc).  1   Intrusive or disruptive behaviors 1   TOTAL 6

## 2023-09-29 NOTE — CARE PLAN
09/28/23 2107   Group Therapy Session   Group Attendance attended group session   Time Session Began 2000   Time Session Ended 2055   Total Time (minutes) 45   Total # Attendees 5   Group Type psychotherapeutic   Group Topic Covered coping skills/lifestyle management;emotions/expression   Group Session Detail CTC-Group members completed/discussed worksheets on practicing radical acceptance with a difficult situation.   Patient Response/Contribution cooperative with task;expressed understanding of topic;listened actively;discussed personal experience with topic   Patient Participation Detail Patient presented to group was pleasant, engaged, and checked in feeling good. Pt left inappropriately at their statements about doing drugs like cocaine.

## 2023-09-29 NOTE — PLAN OF CARE
Team Note Due:  Thursday    Assessment/Intervention/Current Symtoms and Care Coordination:  Chart review and met with team, discussed pt progress, symptomology, and response to treatment.  Discussed the discharge plan and any potential impediments to discharge.    - Writer met with patient. Writer introduced herself and explained her role. Writer discussed with patient options for aftercare placement. Pt recognized recently abusing substances to cope with her mental health but also stated that she does not needed CD tx, she would prefer IRTS. Patient also reported working with a  and gave this writer authorization to contact her [facility] access hospital CM.     - Writer called pt's CM, Joellen Page through [facility], [phone]. CM informed this writer that pt's case was closed but it could reopened if a new DA is sent to fax [phone].   - Writer completed DA and faxed it to May Page.   - Writer sent IRTS referrals to: [facility], and [facility]        Discharge Plan or Goal:  Residential treatment, likely IRTS      Barriers to Discharge:  Admitted for SI and substance use. Needs clinical stabilization and medication management.      Referral Status:  [facility], and [facility]   Referred to [facility] for TCM services      Legal Status:  Voluntary/Has legal guardian    County: [place]     Contacts:  Joellen Page through [facility], [phone]       Upcoming Meetings and Dates/Important Information and next steps:  Send new DA to Joellen Page through [facility] at [phone]

## 2023-09-29 NOTE — PROGRESS NOTES
"  ----------------------------------------------------------------------------------------------------------  [facility]  Psychiatry Progress Note  Hospital Day #2     Interim History:     The patient's care was discussed with the treatment team and chart notes were reviewed.    Vitals: VSS wnl  Sleep: 7 hours (09/29/23 0700)  Scheduled medications: Declined afternoon Seroquel   Psychiatric PRN medications: No psychiatric prns given    Staff Report:   Pt appears to sleeping comfortably with even and unlabored respirations during all safety checks. Pt slept for a total of 7 hours. No safety concerns noted. Will continue with same plan of care     Subjective:     Patient Interview:  Bria ZANE Breen was interviewed with our team in the interview room. They state they are doing well today. Patient states voices are \"good\" today and \"not too abusive\". They state they signed a form to allow for discharge in 12 hours. Discussed goals for this inpatient stay such as stabilization and IRTS placement and that those goals are not been fully met. They state that IRTS sounds like a good option, open to that option and agrees to remain inpatient.     Patient states their Venlafaxine is causing \"vision\" symptoms and difficulty focusing. They stated they are trying their \"own experiment today\" and did not take their Venlafaxine today. Discussed the side effects of stopping this medication abruptly. Discussed titrating down on the Venlafaxine instead and patient was in agreement with this plan.     Discussed how their Adderall seems to wear off in the throughout the day. Discussed switching to Concerda for longer coverage. Discussed medication side effects. Patient is agreeable with this plan. Patient states one of their goals is to get a job. States they would like to get their ADHD medication under control to be able maintain a job.     Patient states that they experience dry mouth as a " "side effect of one of their medication and would like something for this. Patient also requests nicotine lansogine for their nicotine cravings.     ROS:  Patient has no bothersome physical symptoms  Patient denies acute concerns     Objective:     Vitals:  BP (!) 141/101 (BP Location: Right arm, Patient Position: Sitting, Cuff Size: Adult Regular)   Pulse 90   Temp 98.8  F (37.1  C) (Oral)   Resp 20   Ht 1.6 m (5' 3\")   Wt 72.9 kg (160 lb 11.2 oz)   SpO2 100%   BMI 28.47 kg/m      Allergies:  Allergies   Allergen Reactions    Bee     Hydroxyzine Hcl      Panic attack    Sulfa Antibiotics Rash       Current Medications:  Scheduled:  Current Facility-Administered Medications   Medication Dose Route Frequency    cephALEXin  500 mg Oral Q8H LIBIA    [START ON 9/30/2023] methylphenidate HCl ER (OSM)  27 mg Oral Daily    nicotine  1 patch Transdermal Daily    nicotine   Transdermal Q8H    QUEtiapine  300 mg Oral At Bedtime       PRN:  Current Facility-Administered Medications   Medication Dose Route Frequency    acetaminophen  650 mg Oral Q4H PRN    alum & mag hydroxide-simethicone  30 mL Oral Q4H PRN    artificial saliva  1 spray Swish & Spit 4x Daily PRN    aspirin-acetaminophen-caffeine  1 tablet Oral Daily PRN    nicotine  2 mg Buccal Q1H PRN    nicotine  2 mg Buccal Q1H PRN    OLANZapine  10 mg Oral TID PRN    Or    OLANZapine  10 mg Intramuscular TID PRN    QUEtiapine  200 mg Oral BID PRN       Labs and Imaging:  New results:   No results found for this or any previous visit (from the past 24 hour(s)).    Data this admission:  - CBC unremarkable  - CMP unremarkable  - TSH normal   - UDS positive for amphetamines, but patient is compliant with their Adderall prescription   - Vit D normal  - Hgb A1c normal  - Lipids unremarkable  - Vit B12 normal  - Folate normal  - Urinalysis on 9/25 revealed increased WBC, large amount of leukocyte esterase urine, increased RBC   - patient taking cephalexin for UTI starting on " "9/26  - EKG normal sinus rhythm, QTc 438     Mental Status Exam:     Oriented to:  Grossly Oriented  General:  Awake and Alert  Appearance:  appears stated age and Grooming is adequate  Behavior/Attitude:  Cooperative, Engaged, Difficult to redirect and Easily distracted  Eye Contact: Appropriate  Psychomotor: Normal no catatonia present  Speech:  appropriate volume/tone and talkative  Language: Fluent in English with appropriate syntax and vocabulary.  Mood:  \"doing well\"  Affect:  broad/full and exaggerated  Thought Process:  coherent, goal directed and tangential  Thought Content:   No SI/HI/AH/VH; No apparent delusions  Associations:  questionable  Insight:  partial due to ability to recognize need for treatment and acknowledgement of mental health, although fixated on specific treatments and does not express strong understanding of their own mental illness   Judgment:  partial due to due to ability to recognize need for treatment and acknowledgement of mental health, although fixated on specific treatments and does not express strong understanding of their own mental illness   Impulse control: limited  Attention Span:  grossly intact  Concentration:  grossly intact  Recent and Remote Memory:  not formally assessed  Fund of Knowledge: average   Muscle Strength and Tone: normal  Gait and Station: Normal     Psychiatric Assessment   Bria \"Leam\" Nuha is a 43 year old female previously diagnosed with schizoaffective disorder, borderline personality disorder, ADHD, depression, OCD, body dysmorphia disorder  who presented via 72 hour hold (and voluntary admission by guardian) on 9/27/23 due to concern for SI, anxiety, and significant behavioral change in the context of psychosocial stressors including chronic mental health issues, family dynamics and homelessness, substance use, and medicine non-adherence. Significant symptoms on admission include SI, auditory hallucinations, depressed mood. The MSE on admission was " pertinent for engaged but inappropriately friendly behavior, hyperverbal speech, exaggerated affect, partial insight/judgement, innattentiveness, and limited impulse control. Biological contributions to mental health presentation include family history of mental illness (depression, anxiety) including patient's own experiences with mood dysregulation, polysubstance use, prior challenges with eating disorder resulting in cardiac complications in early 20s, and inconsistent medication adherence with multiple psychotic breaks over 10+ years. Psychological contributions to mental health presentation include partial insight, emotional lability with minimizing behaviors, and life-long challenges with socializing with peers. Social factors contributing to mental health presentation include multiple years of homelessness, reported emotional neglect from parents since childhood (with presently strained relationship, guardianship by mother), difficulties maintaining stable work due to inattentiveness/boredom, legal challenges, and limited mental health follow-up. Protective factors include engagement with treatment.      In summary, the patient's reported symptoms of SI and depressed mood in the context of significant psychosocial stressors - in the setting of present psychosis -  are consistent with a previous diagnosis of Schizoaffective Disorder, depressed type. Patients inattentiveness, impulsivity, difficulty sustaining attention/ completing tasks, and forgetfulness presently and extending through childhood are consistent with a previous diagnosis of ADHD, inattentive type. Patient's history of bizarre behavior (limited boundaries, inappropriate familiarity,  exaggerated speech and affect, using appearance to draw attention, shifting and intermittently shallow emotions) are suggestive of an underlying cluster B personality disorder (histrionic vs BPD vs alternative) however this presently unclear and ultimately  "challenging to effectively evaluate on an inpatient basis and would therefore benefit from further workup on an outpatient basis.  She will likely benefit from medication management, appropriate therapies, and connection with resources this admission.     Given that she currently has SI, AH, and worsening depression, patient warrants inpatient psychiatric hospitalization to maintain her safety.      Psychiatric Plan by Diagnosis      Today's changes:  - Start Methylphenidate 27mg daily   - Began titration of Venlafaxine --> 37.5 mg 9/30  - Biotene solution, 1 spray 4 times daily PRN     #Schizoaffective disorder, depressive type  1. Medications:  -Seroquel XR 300mg  -Effexor XR 24hr 37.5mg      #ADHD  -Methylphenidate 27mg daily      2. Pertinent Labs/Monitoring:   -Wtc 438     3. Additional Plans:  - Patient will be treated in therapeutic milieu with appropriate individual and group therapies as described     Psychiatric Hospital Course:      Bria Breen was admitted to [facility] as a voluntary patien (per guardian)  Medications:  PTA Adderall & Effexor were restarted.  Patient was started on Seroquel 300mg at bedtime in ED.    9/29: Adderall was discontinued and patient started on Methylphenidate 27mg daily. Venlafaxine titrated to 37.5mg due to \"vision symptoms\" per patient. Artificial saliva PRN for dry mouth.      The risks, benefits, alternatives, and side effects were discussed and understood by the patient and guardian.     Medical Assessment and Plan     Medical diagnoses to be addressed this admission:       #UTI  -Keflex 500mg q8h, last day 9/30/23     #Nicotine Use Disorder  -Nicotine patch 21mg/24hr patch daily  -Nicotine gum  -Nicotine lozenge     #Migraines  -Excedrin Migraine 1 tablet daily PRN  -Acetaminophen 650mg q4h PRN     Medical course:  Patient was physically examined by the ED prior to being transferred to the unit and was found to be medically stable and appropriate for admission.    "   Consults: None     Checklist     Legal Status: 72-h Hold signed on 9/27/23      Safety Assessment:   Behavioral Orders   Procedures    Code 1 - Restrict to Unit    Elopement precautions    Routine Programming     As clinically indicated    Status 15     Every 15 minutes.    Suicide precautions     Patients on Suicide Precautions should have a Combination Diet ordered that includes a Diet selection(s) AND a Behavioral Tray selection for Safe Tray - with utensils, or Safe Tray - NO utensils         Risk Assessment:  Risk for harm is moderate.  Risk factors: SI, maladaptive coping, family dynamics and impulsive  Protective factors: engaged in treatment     SIO: No    Disposition: TBD. Disposition pending clinical stabilization, medication optimization and development of an appropriate discharge plan.     Attestations     Med Student: Sonali Henriquez, MS3  [facility] Medical Student     I was present with the medical student who participated in the service and in the documentation of the note. I have verified the history and personally performed the exam and medical decision making. I agree with the assessment and plan of care as documented in the note. Jayme Cao M.D., Ph.D.

## 2023-09-29 NOTE — PLAN OF CARE
"Slept in this morning until 12:00.  Took morning Adderall at that time, declining to take Effexor and nicotine patch stating they felt those medications were causing them to not to feel well.   At 12:03 asked what they needed to do to be discharged.  States they are feeling better and would like to be discharge.  Denies hallucinations and suicidal thoughts.  Answers  to questions tend to be guarded.  Explained 12 hour intent to leave form and reminded them that their guardian would also have to agree to discharge.  Elected to sign 12 hour intent to leave at 12:03.  Care Team informed.  Rescinded 12 hour intent to leave at 12:53.   Has attended afternoon groups. Pleasant, calm and cooperative.  Affect is bright, smiling and laughing frequently.     Problem: Adult Behavioral Health Plan of Care  Goal: Plan of Care Review  Outcome: Not Progressing  Flowsheets (Taken 9/29/2023 1200)  Patient Agreement with Plan of Care: agrees  Goal: Patient-Specific Goal (Individualization)  Description: You can add care plan individualizations to a care plan. Examples of Individualization might be:  \"Parent requests to be called daily at 9am for status\", \"I have a hard time hearing out of my right ear\", or \"Do not touch me to wake me up as it startles me\".  Outcome: Not Progressing  Goal: Individualized Daily Interaction Plan (IDIP)  Outcome: Not Progressing  Goal: Adheres to Safety Considerations for Self and Others  Outcome: Not Progressing  Goal: Absence of New-Onset Illness or Injury  Outcome: Not Progressing  Goal: Optimized Coping Skills in Response to Life Stressors  Outcome: Not Progressing  Goal: Develops/Participates in Therapeutic [place] to Support Successful Transition  Outcome: Not Progressing  Intervention: Foster Therapeutic [place]  Recent Flowsheet Documentation  Taken 9/29/2023 1200 by Darcy Marshall RN  Trust Relationship/Rapport:   care explained   questions answered     Problem: Sleep Disturbance  Goal: " Adequate Sleep/Rest  Outcome: Not Progressing     Problem: Anxiety  Goal: Anxiety Reduction or Resolution  Outcome: Not Progressing     Problem: Depression  Goal: Improved Mood  Outcome: Not Progressing     Problem: Pain Acute  Goal: Optimal Pain Control and Function  Outcome: Not Progressing     Problem: Psychotic Signs/Symptoms  Goal: Improved Behavioral Control (Psychotic Signs/Symptoms)  Outcome: Not Progressing  Goal: Optimal Cognitive Function (Psychotic Signs/Symptoms)  Outcome: Not Progressing  Intervention: Support and Promote Cognitive Ability  Recent Flowsheet Documentation  Taken 9/29/2023 1200 by Darcy Marshall RN  Trust Relationship/Rapport:   care explained   questions answered  Goal: Increased Participation and Engagement (Psychotic Signs/Symptoms)  Outcome: Not Progressing  Goal: Improved Mood Symptoms  Outcome: Not Progressing  Goal: Improved Psychomotor Symptoms (Psychotic Signs/Symptoms)  Outcome: Not Progressing  Goal: Decreased Sensory Symptoms (Psychotic Signs/Symptoms)  Outcome: Not Progressing  Goal: Improved Sleep (Psychotic Signs/Symptoms)  Outcome: Not Progressing  Goal: Enhanced Social, Occupational or Functional Skills (Psychotic Signs/Symptoms)  Outcome: Not Progressing  Intervention: Promote Social, Occupational and Functional Ability  Recent Flowsheet Documentation  Taken 9/29/2023 1200 by Darcy Marshall RN  Trust Relationship/Rapport:   care explained   questions answered     Problem: Suicidal Behavior  Goal: Suicidal Behavior is Absent or Managed  Outcome: Not Progressing   Goal Outcome Evaluation:    Plan of Care Reviewed With: patient

## 2023-09-29 NOTE — PLAN OF CARE
Problem: Adult Behavioral Health Plan of Care  Goal: Plan of Care Review  Outcome: Progressing     Problem: Sleep Disturbance  Goal: Adequate Sleep/Rest  Outcome: Progressing   Goal Outcome Evaluation:       Pt appears to sleeping comfortably with even and unlabored respirations during all safety checks. Pt slept for a total of 7 hours. No safety concerns noted. Will continue with same plan of care.

## 2023-09-29 NOTE — PROGRESS NOTES
09/29/23 1500    Group Therapy Session   Time Session Began 1115   Time Session Ended 1500   Total Time (minutes) 90   Total # Attendees 5   Group Type expressive therapy   Group Topic Covered self-care activities;relaxation techniques;emotions/expression   Group Session Detail Relaxation/Songwriting/Instrument Clinic   Patient Response/Contribution cooperative with task   Patient Participation Detail Engaged in afternoon session of Songwriting and Instrument Clinic.  In and out of afternoon session, did some abigail writing, but opted not to share with the group.       Pt shared a song pt wrote with the group about pt's ADHD process and how not having prescription meds caused pt to turn to street drugs.    Pt presented as wanting help/motivated to change, also requested support from the group for pts upcoming court appt which pt is anxious about.      Pt presented as highly creative, philosophical and reflective.  Also mentioned being newly , and disliking romantic songs right now.     Pleasant and talkative.  Open and socially collaborative.

## 2023-09-30 PROCEDURE — 250N000013 HC RX MED GY IP 250 OP 250 PS 637: Performed by: PSYCHIATRY & NEUROLOGY

## 2023-09-30 PROCEDURE — 250N000013 HC RX MED GY IP 250 OP 250 PS 637

## 2023-09-30 PROCEDURE — 124N000002 HC R&B MH UMMC

## 2023-09-30 PROCEDURE — G0177 OPPS/PHP; TRAIN & EDUC SERV: HCPCS

## 2023-09-30 RX ADMIN — NICOTINE POLACRILEX 2 MG: 2 LOZENGE ORAL at 13:03

## 2023-09-30 RX ADMIN — Medication 1 SPRAY: at 22:00

## 2023-09-30 RX ADMIN — ACETAMINOPHEN, ASPIRIN, CAFFEINE 1 TABLET: 250; 65; 250 TABLET, FILM COATED ORAL at 16:58

## 2023-09-30 RX ADMIN — NICOTINE POLACRILEX 2 MG: 2 LOZENGE ORAL at 10:47

## 2023-09-30 RX ADMIN — NICOTINE POLACRILEX 2 MG: 2 GUM, CHEWING BUCCAL at 16:25

## 2023-09-30 RX ADMIN — METHYLPHENIDATE HYDROCHLORIDE 27 MG: 27 TABLET, EXTENDED RELEASE ORAL at 08:10

## 2023-09-30 RX ADMIN — Medication 1 SPRAY: at 10:47

## 2023-09-30 RX ADMIN — CEPHALEXIN 500 MG: 500 CAPSULE ORAL at 08:10

## 2023-09-30 RX ADMIN — NICOTINE POLACRILEX 2 MG: 2 LOZENGE ORAL at 08:14

## 2023-09-30 RX ADMIN — NICOTINE 1 PATCH: 21 PATCH, EXTENDED RELEASE TRANSDERMAL at 08:10

## 2023-09-30 RX ADMIN — NICOTINE POLACRILEX 2 MG: 2 LOZENGE ORAL at 17:25

## 2023-09-30 RX ADMIN — NICOTINE POLACRILEX 2 MG: 2 LOZENGE ORAL at 21:09

## 2023-09-30 RX ADMIN — NICOTINE POLACRILEX 2 MG: 2 LOZENGE ORAL at 09:17

## 2023-09-30 RX ADMIN — Medication 1 SPRAY: at 17:20

## 2023-09-30 RX ADMIN — NICOTINE POLACRILEX 2 MG: 2 GUM, CHEWING BUCCAL at 14:41

## 2023-09-30 ASSESSMENT — ACTIVITIES OF DAILY LIVING (ADL)
ADLS_ACUITY_SCORE: 28
HYGIENE/GROOMING: INDEPENDENT
ADLS_ACUITY_SCORE: 28
DRESS: INDEPENDENT
ADLS_ACUITY_SCORE: 28
ADLS_ACUITY_SCORE: 28
HYGIENE/GROOMING: INDEPENDENT
DRESS: INDEPENDENT
ADLS_ACUITY_SCORE: 28
ORAL_HYGIENE: INDEPENDENT
ORAL_HYGIENE: INDEPENDENT
ADLS_ACUITY_SCORE: 28
ADLS_ACUITY_SCORE: 28

## 2023-09-30 NOTE — PLAN OF CARE
Problem: Sleep Disturbance  Goal: Adequate Sleep/Rest  Outcome: Progressing   Goal Outcome Evaluation:     Pt was in bed at start of this shift. The patient appears to have slept for 7 hours. Denies pain or discomfort this shift. Pt refused their scheduled Keflex at 0600. No PRN was given. There are no behavioral or safety concerns at this time. Safety rounds completed. Nursing will continue to monitor and offer support.      Temp: 98  F (36.7  C) Temp src: Oral BP: (!) 142/100 Pulse: 93   Resp: 18 SpO2: 99 % O2 Device: None (Room air)

## 2023-09-30 NOTE — CARE PLAN
"   09/30/23 1426   Group Therapy Session   Group Attendance attended group session   Time Session Began 1315   Time Session Ended 1400   Total Time (minutes) 45   Total # Attendees 6   Group Type community;recreation;life skill   Group Topic Covered cognitive activities;balanced lifestyle;leisure exploration/use of leisure time;structured socialization   Group Session Detail OT Leisure Group: Group activities to exercise cognitive skills while exploring leisure and socializing opportunities; \"Tapple\" word game - players work quickly to think of items in a category that begin with a particular letter.   Patient Participation Detail Pt participated in a group game playing Tapple. Pt appeared upbeat and social, making jokes and sharing their opinions on topics brought up in conversations. Pt was able to play independently, keeping track of turns and the answers listed. Pt provided creative responses to the mitali questions.       "

## 2023-09-30 NOTE — PLAN OF CARE
Has been visible in the milieu, social with peers.  Affect is bright and friendly, smiling and laughing frequently.  Reports that Concerta is helping them focus better, therefore decreasing and eliminating their anxiety, depression and suicidal thoughts.  Reports that if they focus on the white noise they can hear the voices talking to them, otherwise auditory hallucinations are not present.     Problem: Adult Behavioral Health Plan of Care  Goal: Plan of Care Review  Recent Flowsheet Documentation  Taken 9/30/2023 1039 by Darcy Marshall RN  Patient Agreement with Plan of Care: agrees  Goal: Develops/Participates in Therapeutic [place] to Support Successful Transition  Intervention: Foster Therapeutic [place]  Recent Flowsheet Documentation  Taken 9/30/2023 1039 by Darcy Marshall RN  Trust Relationship/Rapport:   care explained   questions answered     Problem: Psychotic Signs/Symptoms  Goal: Optimal Cognitive Function (Psychotic Signs/Symptoms)  Intervention: Support and Promote Cognitive Ability  Recent Flowsheet Documentation  Taken 9/30/2023 1039 by Darcy Marshall RN  Trust Relationship/Rapport:   care explained   questions answered  Goal: Enhanced Social, Occupational or Functional Skills (Psychotic Signs/Symptoms)  Intervention: Promote Social, Occupational and Functional Ability  Recent Flowsheet Documentation  Taken 9/30/2023 1039 by Darcy Marshall RN  Trust Relationship/Rapport:   care explained   questions answered     Problem: Anxiety  Goal: Anxiety Reduction or Resolution  Outcome: Progressing     Problem: Depression  Goal: Improved Mood  Outcome: Progressing     Problem: Suicidal Behavior  Goal: Suicidal Behavior is Absent or Managed  Outcome: Progressing   Goal Outcome Evaluation:    Plan of Care Reviewed With: patient

## 2023-10-01 PROCEDURE — 250N000013 HC RX MED GY IP 250 OP 250 PS 637: Performed by: PSYCHIATRY & NEUROLOGY

## 2023-10-01 PROCEDURE — 124N000002 HC R&B MH UMMC

## 2023-10-01 PROCEDURE — 250N000013 HC RX MED GY IP 250 OP 250 PS 637

## 2023-10-01 PROCEDURE — 90853 GROUP PSYCHOTHERAPY: CPT

## 2023-10-01 RX ADMIN — NICOTINE POLACRILEX 2 MG: 2 LOZENGE ORAL at 16:06

## 2023-10-01 RX ADMIN — NICOTINE POLACRILEX 2 MG: 2 LOZENGE ORAL at 08:52

## 2023-10-01 RX ADMIN — OLANZAPINE 10 MG: 10 TABLET, FILM COATED ORAL at 16:06

## 2023-10-01 RX ADMIN — Medication 1 SPRAY: at 13:16

## 2023-10-01 RX ADMIN — NICOTINE POLACRILEX 2 MG: 2 LOZENGE ORAL at 17:20

## 2023-10-01 RX ADMIN — NICOTINE POLACRILEX 2 MG: 2 LOZENGE ORAL at 11:25

## 2023-10-01 RX ADMIN — NICOTINE 1 PATCH: 21 PATCH, EXTENDED RELEASE TRANSDERMAL at 08:49

## 2023-10-01 RX ADMIN — NICOTINE POLACRILEX 2 MG: 2 LOZENGE ORAL at 14:27

## 2023-10-01 RX ADMIN — NICOTINE POLACRILEX 2 MG: 2 LOZENGE ORAL at 10:17

## 2023-10-01 RX ADMIN — NICOTINE POLACRILEX 2 MG: 2 LOZENGE ORAL at 13:16

## 2023-10-01 RX ADMIN — METHYLPHENIDATE HYDROCHLORIDE 27 MG: 27 TABLET, EXTENDED RELEASE ORAL at 08:49

## 2023-10-01 ASSESSMENT — ACTIVITIES OF DAILY LIVING (ADL)
ADLS_ACUITY_SCORE: 28
ADLS_ACUITY_SCORE: 28
HYGIENE/GROOMING: INDEPENDENT
HYGIENE/GROOMING: SHOWER;INDEPENDENT
ADLS_ACUITY_SCORE: 28
ADLS_ACUITY_SCORE: 28
ORAL_HYGIENE: INDEPENDENT
ADLS_ACUITY_SCORE: 28
ORAL_HYGIENE: INDEPENDENT
DRESS: STREET CLOTHES
ADLS_ACUITY_SCORE: 28
ADLS_ACUITY_SCORE: 28
DRESS: STREET CLOTHES;INDEPENDENT
ADLS_ACUITY_SCORE: 28

## 2023-10-01 NOTE — PLAN OF CARE
"  Problem: Suicidal Behavior  Goal: Suicidal Behavior is Absent or Managed  Outcome: Progressing     Problem: Adult Behavioral Health Plan of Care  Goal: Plan of Care Review  Outcome: Progressing   Goal Outcome Evaluation:    Pt was visible in the milieu watching TV. Affect flat but brightened upon assessment.  Alert and oriented x4.Cooperative and med compliant. Complain of headache. Requested prn Excedrin . Excedrin given at 1645. Pt verbalized effectiveness. Stated, \"hanging out here is depressing. Denies, anxiety, HI, SI/SIB, visual and auditory hallucination. Adequate po intake. B/p 171/117 at 4 pm. Rechecked B/p at 1900, it was 130/90. The resident  on call is aware.  Pt declined any signs and symptoms of hypertension.  Pt refused schedule Seroquel. Stated her eyes get blurry when she takes it.  Gave prn Lozenge and biotene per pt request . Pt is on SI precautions. Pt is voluntary. Pt attended OT group.    "

## 2023-10-01 NOTE — PLAN OF CARE
Problem: Sleep Disturbance  Goal: Adequate Sleep/Rest  Outcome: Progressing   Goal Outcome Evaluation:    The patient was in bed at the start of this shift, appears to have slept for 6 hours. No  pain or discomfort reported this shift. No PRN was given. There are no behavioral or safety concerns at this time. Safety rounds completed. Nursing will continue to monitor and offer support.    Temp: 98.2  F (36.8  C) Temp src: Oral BP: (!) 132/90 Pulse: 76   Resp: 16 SpO2: 96 % O2 Device: None (Room air)

## 2023-10-01 NOTE — PLAN OF CARE
"Showered this morning.  Visible in the milieu. Reports feeling anxious citing having a Zoom court hearing tomorrow for a prior to admission legal case from [place].    Denies depression and suicidal thoughts.  Reports having auditory hallucinations, requested paper to journal as a distraction.    Had poor boundaries at lunch, taking food off a peers lunch tray without consent.  Asked a peer when she was going to have her baby (peer not pregnant), then laughing, entered a peers room, when instructed not to go into peers room stated \"but she is my friend\".   Speech tends to be animated, laughing loudly at times.  Used a red erasable marker on their face, drawing on their lips, around their eyes and a straight line down their forehead. States \"I don't have make up so I guess this works\".  Later washed marker off  their face and applied bluemaker to their neck area.    Problem: Adult Behavioral Health Plan of Care  Goal: Plan of Care Review  Recent Flowsheet Documentation  Taken 10/1/2023 1030 by Darcy Marshall RN  Patient Agreement with Plan of Care: agrees  Goal: Develops/Participates in Therapeutic [place] to Support Successful Transition  Intervention: Foster Therapeutic [place]  Recent Flowsheet Documentation  Taken 10/1/2023 1030 by Darcy Marshall RN  Trust Relationship/Rapport:   questions answered   care explained     Problem: Psychotic Signs/Symptoms  Goal: Optimal Cognitive Function (Psychotic Signs/Symptoms)  Intervention: Support and Promote Cognitive Ability  Recent Flowsheet Documentation  Taken 10/1/2023 1030 by Darcy Marshall RN  Trust Relationship/Rapport:   questions answered   care explained  Goal: Enhanced Social, Occupational or Functional Skills (Psychotic Signs/Symptoms)  Intervention: Promote Social, Occupational and Functional Ability  Recent Flowsheet Documentation  Taken 10/1/2023 1030 by Darcy Marshall RN  Trust Relationship/Rapport:   questions answered   care explained   "   Problem: Anxiety  Goal: Anxiety Reduction or Resolution  Outcome: Not Progressing   Goal Outcome Evaluation:    Plan of Care Reviewed With: patient

## 2023-10-01 NOTE — PLAN OF CARE
Problem: Adult Behavioral Health Plan of Care  Goal: Plan of Care Review  Outcome: Progressing  Flowsheets (Taken 10/1/2023 1500)  Patient Agreement with Plan of Care: agrees     Problem: Anxiety  Goal: Anxiety Reduction or Resolution  Outcome: Progressing     Problem: Depression  Goal: Improved Mood  Outcome: Progressing     Problem: Psychotic Signs/Symptoms  Goal: Improved Behavioral Control (Psychotic Signs/Symptoms)  Outcome: Progressing   Goal Outcome Evaluation:    Plan of Care Reviewed With: patient      Pt has been visible in the milieu, calm and sociable with peers, cooperative with assessment questions. Pt watched television and was withdrawn to self stating she's bored not having anything to do in the unit, encouraged to attend groups and did so. Pt endorsed having auditory hallucinations, endorsed depression and anxiety 5/10, PRN Zyprexa was given and pt stated it was effective. Pt denied SI, SIB, HI, denied Visual Hallucinations and all other psyche issues. Contracted for safety. PRN Lozenge was given per request. [facility], was told to go sleep in the room but refused. Pt intake and nutrition is adequate, hygiene is appropriate. No any outburst behavior noted.  Pt refused her HS Seroquel stated she had bad side effects from it    BP (!) 141/91   Pulse 82   Temp 98.4  F   Resp 16  SpO2 99%

## 2023-10-01 NOTE — PROGRESS NOTES
09/30/23 2126   Group Therapy Session   Group Attendance attended group session   Time Session Began 2000   Time Session Ended 2100   Total Time (minutes) 60   Total # Attendees 3   Group Type task skill;life skill   Group Session Detail Education and group discussion provided on the importance of changing negative thinking to positive with hands on task to make an Affirmation or Gratitude Calendar for the month of October for fostering hope, improving mood, reality-based activity, creative expression, fine motor skills, self-expression, building self- esteem, coping, healthy leisure and socialization.   Patient Participation Detail Pt was pleasant though presented with a lot of energy and limited boundaries with others in the group.  Pt was soical and thought of social, Halloween themed questions to ask others while they were working on their calendars.  Pt intermittently worked on their calendar though had made limited progress by the end of the group session.  Pt was helptul with clean up at the end of the group session.

## 2023-10-02 PROCEDURE — H2032 ACTIVITY THERAPY, PER 15 MIN: HCPCS

## 2023-10-02 PROCEDURE — 99232 SBSQ HOSP IP/OBS MODERATE 35: CPT | Mod: GC | Performed by: PSYCHIATRY & NEUROLOGY

## 2023-10-02 PROCEDURE — 250N000013 HC RX MED GY IP 250 OP 250 PS 637: Performed by: PSYCHIATRY & NEUROLOGY

## 2023-10-02 PROCEDURE — 250N000013 HC RX MED GY IP 250 OP 250 PS 637

## 2023-10-02 PROCEDURE — 124N000002 HC R&B MH UMMC

## 2023-10-02 RX ORDER — METHYLPHENIDATE HYDROCHLORIDE 36 MG/1
36 TABLET ORAL DAILY
Status: DISCONTINUED | OUTPATIENT
Start: 2023-10-03 | End: 2023-10-05 | Stop reason: HOSPADM

## 2023-10-02 RX ORDER — OLANZAPINE 10 MG/1
10 TABLET ORAL 2 TIMES DAILY
Status: DISCONTINUED | OUTPATIENT
Start: 2023-10-02 | End: 2023-10-05 | Stop reason: HOSPADM

## 2023-10-02 RX ORDER — QUETIAPINE FUMARATE 25 MG/1
25 TABLET, FILM COATED ORAL 2 TIMES DAILY PRN
Status: DISCONTINUED | OUTPATIENT
Start: 2023-10-02 | End: 2023-10-05 | Stop reason: HOSPADM

## 2023-10-02 RX ADMIN — NICOTINE POLACRILEX 2 MG: 2 LOZENGE ORAL at 15:03

## 2023-10-02 RX ADMIN — NICOTINE POLACRILEX 2 MG: 2 LOZENGE ORAL at 14:03

## 2023-10-02 RX ADMIN — NICOTINE POLACRILEX 2 MG: 2 LOZENGE ORAL at 08:20

## 2023-10-02 RX ADMIN — NICOTINE POLACRILEX 2 MG: 2 LOZENGE ORAL at 21:41

## 2023-10-02 RX ADMIN — NICOTINE POLACRILEX 2 MG: 2 LOZENGE ORAL at 09:12

## 2023-10-02 RX ADMIN — METHYLPHENIDATE HYDROCHLORIDE 27 MG: 27 TABLET, EXTENDED RELEASE ORAL at 08:20

## 2023-10-02 RX ADMIN — OLANZAPINE 10 MG: 10 TABLET, FILM COATED ORAL at 11:40

## 2023-10-02 RX ADMIN — NICOTINE POLACRILEX 2 MG: 2 LOZENGE ORAL at 11:38

## 2023-10-02 RX ADMIN — NICOTINE 1 PATCH: 21 PATCH, EXTENDED RELEASE TRANSDERMAL at 08:21

## 2023-10-02 RX ADMIN — OLANZAPINE 10 MG: 10 TABLET, FILM COATED ORAL at 21:12

## 2023-10-02 RX ADMIN — NICOTINE POLACRILEX 2 MG: 2 LOZENGE ORAL at 20:11

## 2023-10-02 RX ADMIN — NICOTINE POLACRILEX 2 MG: 2 LOZENGE ORAL at 17:35

## 2023-10-02 RX ADMIN — NICOTINE POLACRILEX 2 MG: 2 LOZENGE ORAL at 16:25

## 2023-10-02 ASSESSMENT — ACTIVITIES OF DAILY LIVING (ADL)
ADLS_ACUITY_SCORE: 28
HYGIENE/GROOMING: INDEPENDENT;SHOWER
ADLS_ACUITY_SCORE: 28
ADLS_ACUITY_SCORE: 28
DRESS: INDEPENDENT;STREET CLOTHES
ADLS_ACUITY_SCORE: 28
ORAL_HYGIENE: INDEPENDENT
ADLS_ACUITY_SCORE: 28
LAUNDRY: WITH SUPERVISION
ADLS_ACUITY_SCORE: 28

## 2023-10-02 NOTE — PROGRESS NOTES
10/02/23 1200    Group Therapy Session   Time Session Began 1020   Time Session Ended 1110   Total Time (minutes) 38   Total # Attendees 5   Group Type expressive therapy   Group Topic Covered balanced lifestyle;coping skills/lifestyle management;emotions/expression   Group Session Detail Relaxation   Patient Response/Contribution cooperative with task   Patient Participation Detail Engaged in Morning Music Relaxation group to decrease anxiety and promote self-expression.      Pt entered group late, in upbeat but disorganized, intrusive presentation.  Was feeling along the floor under cabinets (not able to reach anything), intrusive into writer's supplies, but oblivious that this was intrusive.  Sensory-seeking with upbeat music.  Pt had colors on their skin, as if drawn previously to group with a marker (on throat and eyes).      Superficially bright affect; pt did not express the pains pt had expressed last Friday re: recent divorce, appearing disassociated from their pain now.

## 2023-10-02 NOTE — PLAN OF CARE
Team Note Due:  Thursday    Assessment/Intervention/Current Symtoms and Care Coordination:  Chart review and met with team, discussed pt progress, symptomology, and response to treatment.  Discussed the discharge plan and any potential impediments to discharge.    - On 5/29 Writer received a called from a [place] probate atty, Ramez Atkinson. Writer was informed pt has an upcoming hearing appearance via zoom on 10/2 at 11:15 am.   - Writer provided pt with upcoming court hearing information.    - Writer called pt's CM, Joellen Page through [facility], [phone]. Writer was informed that the referral for CM services was received.   - Writer assisted pt to attended court hearing via zoom at 11:15 am.  - Writer sent additional IRTS referrals to: [facility], [facility], [facility], [facility], [facility].      Discharge Plan or Goal:  IRTS      Barriers to Discharge:  Admitted for SI and substance use. Needs clinical stabilization and medication management.      Referral Status:  [employer] [facility], and [facility]. [facility], [facility], [facility], [facility], [facility].   Referred to [facility] for TCM services      Legal Status:  Voluntary/Has legal guardian    [facility]     Contacts:  Joellen Page through [facility], [phone]       Upcoming Meetings and Dates/Important Information and next steps:  Send new DA to Joellen Page through [facility] at [phone]    no anomalies noted

## 2023-10-02 NOTE — CARE PLAN
10/01/23 1914   Group Therapy Session   Group Attendance attended group session   Time Session Began 1600   Time Session Ended 1655   Total Time (minutes) 55   Total # Attendees 5   Group Type psychotherapeutic   Group Topic Covered coping skills/lifestyle management;emotions/expression   Group Session Detail CTC-Group members completed/discussed a worksheet on triggers, avoiding exposures, and strategies to deal with triggers when they cannot be avoided.   Patient Response/Contribution cooperative with task;expressed understanding of topic;listened actively;discussed personal experience with topic;offered helpful suggestions to peers   Patient Participation Detail Patient presented to group was pleasant, engaged, and checked in feeling never better.

## 2023-10-02 NOTE — PLAN OF CARE
"Pt denied, denied hallucinations, denied feeling manic.    This am pt very interested to know what all the staff people are and what roles are.  Pt asked another patient if they snort sugar.  Pt proceed to pour sugar on counter and using stray to form a line of sugar.  Staff redirected to not snort sugar.  Pt stated they wanted a sugar high.  Pt asked another pt to trade pieces of pizza with another patient.  Lazaro had cheese pizza this other pt had pepperoni.  Questioned pt that they had wanted vegetarian diet.  Pt relied that they just wanted to try it out today.  Pt did attend some groups, went to court via zoom.    Problem: Adult Behavioral Health Plan of Care  Goal: Plan of Care Review  Outcome: Not Progressing  Goal: Patient-Specific Goal (Individualization)  Description: You can add care plan individualizations to a care plan. Examples of Individualization might be:  \"Parent requests to be called daily at 9am for status\", \"I have a hard time hearing out of my right ear\", or \"Do not touch me to wake me up as it startles me\".  Outcome: Not Progressing  Goal: Individualized Daily Interaction Plan (IDIP)  Outcome: Not Progressing  Goal: Adheres to Safety Considerations for Self and Others  Outcome: Not Progressing  Intervention: Develop and Maintain Individualized Safety Plan  Recent Flowsheet Documentation  Taken 10/2/2023 1400 by Edelmira Whitley RN  Safety Measures:   environmental rounds completed   safety rounds completed  Goal: Absence of New-Onset Illness or Injury  Outcome: Not Progressing  Intervention: Identify and Manage Fall Risk  Recent Flowsheet Documentation  Taken 10/2/2023 1400 by Edelmira Whitley RN  Safety Measures:   environmental rounds completed   safety rounds completed  Goal: Optimized Coping Skills in Response to Life Stressors  Outcome: Not Progressing  Goal: Develops/Participates in Therapeutic [place] to Support Successful Transition  Outcome: Not Progressing   Goal Outcome Evaluation:     "

## 2023-10-02 NOTE — PROGRESS NOTES
"  ----------------------------------------------------------------------------------------------------------  [facility]  Psychiatry Progress Note  Hospital Day #6     Interim History:     The patient's care was discussed with the treatment team and chart notes were reviewed.    Vitals: VSS wnl  Sleep: 7 hours (10/02/23 0642)  Scheduled medications: Declined afternoon Seroquel   Psychiatric PRN medications: No psychiatric prns given    Staff Report:   Patient sleeping most of the shift. Patient no complains of pain and discomfort so far. Patient no behavioral issue or safety concerns so far this shift. Patient appears to have 7 total hours of sleep. Will continue with current plan of care. Offer and provide support as needed. Will notify MD with any concerns.     Subjective:     Patient Interview:  Bria Breen was interviewed with our team in the interview room. States their mood is \"pretty good\" today but gets bored easily. States they replay times in their life when they got rejected. Discussed the possibility of referring them to DBT when they are discharged. Patient endorses anxiety today regarding their court hearing this afternoon. Discusses that they \"have an empathy problem\". They state \"I can look at other people being anxious and then I feel anxious\". They state their sleep has been \"ok\". Endorsed using a weighted blanket last night to sleep and that was helpful. States it's hard to fall asleep at night due to racing thoughts. When prompted about the marker drawings on their body, they state \"in the outside, I wear makeup\". Suggested to the patient to have their mother bring makeup to use instead of marker. Patient stated the voices are quieter with the medication today. States as long as they stay busy, the voices are more quieter. When they are in silence, the voices seem more manageable than before.    Patient states that they are experiencing \"vision " "issues\" when using Seroquel. States when they try to read things on the wall, things seem a bit blurry. Discussed discontinuing Seroquel and starting Zyprexa. Discussed having a small dose Seroquel PRN during the day. Patient was in agreement with this plan.     They stated the Concerda is going ok, but feels like they loose focus around 2pm. States \"there is something still amiss\". States when taking the Concerda, they are better able to focus and their mood is better. The patient requested their Concerda dosage to be increased to 36mg. The team was in agreement with this plan.     Patient was interested in discussing discharge plan. Discussed we have put in a referral with IRTS and the next step is to interview for housing.     ROS:  Patient has no bothersome physical symptoms  Patient denies acute concerns     Objective:     Vitals:  BP (!) 141/91   Pulse 82   Temp 98.3  F (36.8  C)   Resp 16   Ht 1.6 m (5' 3\")   Wt 72.9 kg (160 lb 11.2 oz)   SpO2 100%   BMI 28.47 kg/m      Allergies:  Allergies   Allergen Reactions    Bee     Hydroxyzine Hcl      Panic attack    Sulfa Antibiotics Rash       Current Medications:  Scheduled:  Current Facility-Administered Medications   Medication Dose Route Frequency    [START ON 10/3/2023] methylphenidate HCl ER (OSM)  36 mg Oral Daily    nicotine  1 patch Transdermal Daily    nicotine   Transdermal Q8H    OLANZapine  10 mg Oral BID       PRN:  Current Facility-Administered Medications   Medication Dose Route Frequency    acetaminophen  650 mg Oral Q4H PRN    alum & mag hydroxide-simethicone  30 mL Oral Q4H PRN    artificial saliva  1 spray Swish & Spit 4x Daily PRN    aspirin-acetaminophen-caffeine  1 tablet Oral Daily PRN    nicotine  2 mg Buccal Q1H PRN    nicotine  2 mg Buccal Q1H PRN    OLANZapine  10 mg Oral TID PRN    Or    OLANZapine  10 mg Intramuscular TID PRN    QUEtiapine  200 mg Oral BID PRN    QUEtiapine  25 mg Oral BID PRN       Labs and Imaging:  New " "results:   No results found for this or any previous visit (from the past 24 hour(s)).    Data this admission:  - CBC unremarkable  - CMP unremarkable  - TSH normal   - UDS positive for amphetamines, but patient is compliant with their Adderall prescription   - Vit D normal  - Hgb A1c normal  - Lipids unremarkable  - Vit B12 normal  - Folate normal  - Urinalysis on 9/25 revealed increased WBC, large amount of leukocyte esterase urine, increased RBC   - patient taking cephalexin for UTI starting on 9/26  - EKG normal sinus rhythm, QTc 438     Mental Status Exam:     Oriented to:  Grossly Oriented  General:  Awake and Alert  Appearance:  appears stated age and Grooming is inadequate due to marker all over face, smudges of food on clothing  Behavior/Attitude:  Engaged, Guarded, Difficult to redirect, and Easily distracted  Eye Contact: Appropriate  Psychomotor: Restless no catatonia present  Speech:  appropriate volume/tone and talkative  Language: Fluent in English with appropriate syntax and vocabulary.  Mood: \"pretty good\"  Affect:  inappropriate, broad/full, exaggerated, and animated  Thought Process:  coherent, goal directed, perseverative, tangential, and racing thoughts  Thought Content:   negative content of hallucinations and distressing hallucinations although quieter than previous; No apparent delusions  Associations:  questionable  Insight:  partial due to ability to recognize need for treatment and acknowledgement of mental health, although fixated on specific treatments and does not express strong understanding of their own mental illness   Judgment:  partial due to above  Impulse control: limited  Attention Span:  adequate for conversation  Concentration:  grossly intact  Recent and Remote Memory:  not formally assessed  Fund of Knowledge: average   Muscle Strength and Tone: normal  Gait and Station: Normal     Psychiatric Assessment   Bria \"Lazaro\" Nuha is a 43 year old female previously diagnosed with " schizoaffective disorder, borderline personality disorder, ADHD, depression, OCD, body dysmorphia disorder  who presented via 72 hour hold (and voluntary admission by guardian) on 9/27/23 due to concern for SI, anxiety, and significant behavioral change in the context of psychosocial stressors including chronic mental health issues, family dynamics and homelessness, substance use, and medicine non-adherence. Significant symptoms on admission include SI, auditory hallucinations, depressed mood. The MSE on admission was pertinent for engaged but inappropriately friendly behavior, hyperverbal speech, exaggerated affect, partial insight/judgement, innattentiveness, and limited impulse control. Biological contributions to mental health presentation include family history of mental illness (depression, anxiety) including patient's own experiences with mood dysregulation, polysubstance use, prior challenges with eating disorder resulting in cardiac complications in early 20s, and inconsistent medication adherence with multiple psychotic breaks over 10+ years. Psychological contributions to mental health presentation include partial insight, emotional lability with minimizing behaviors, and life-long challenges with socializing with peers. Social factors contributing to mental health presentation include multiple years of homelessness, reported emotional neglect from parents since childhood (with presently strained relationship, guardianship by mother), difficulties maintaining stable work due to inattentiveness/boredom, legal challenges, and limited mental health follow-up. Protective factors include engagement with treatment.      In summary, the patient's reported symptoms of SI and depressed mood in the context of significant psychosocial stressors - in the setting of present psychosis -  are consistent with a previous diagnosis of Schizoaffective Disorder, depressed type. Patients inattentiveness, impulsivity,  "difficulty sustaining attention/ completing tasks, and forgetfulness presently and extending through childhood are consistent with a previous diagnosis of ADHD, inattentive type. Patient's history of bizarre behavior (limited boundaries, inappropriate familiarity,  exaggerated speech and affect, using appearance to draw attention, shifting and intermittently shallow emotions) are suggestive of an underlying cluster B personality disorder (histrionic vs BPD) or possibly a manifestation of underlying psychosis however this presently unclear and ultimately challenging to effectively evaluate on an inpatient basis and would therefore benefit from further workup on an outpatient basis.  She will likely benefit from medication management, appropriate therapies, and connection with resources this admission.     Given that she currently has SI, AH, and worsening depression as well as impulsivity/poor boundaries , patient warrants inpatient psychiatric hospitalization to maintain her safety.    Psychiatric Plan by Diagnosis      Today's changes:  - Increase Methylphenidate to 36mg daily    -Will monitor for whether this improves or aggravates current symptoms  - Decrease Seroquel to 25mg PRN   - Start Zyprexa 10mg BID    #Schizoaffective disorder, depressive type  1. Medications:  -Seroquel XR 25mg PRN  -Zyprexa 10mg BID     #ADHD  -Methylphenidate 36mg daily      2. Pertinent Labs/Monitoring:   -Wtc 438     3. Additional Plans:  - Patient will be treated in therapeutic milieu with appropriate individual and group therapies as described     Psychiatric Hospital Course:      Bria Breen was admitted to [facility] as a voluntary patien (per guardian)  Medications:  PTA Adderall & Effexor were restarted.  Patient was started on Seroquel 300mg at bedtime in ED.    9/29: Adderall was discontinued and patient started on Methylphenidate 27mg daily. Venlafaxine titrated to 37.5mg due to \"vision symptoms\" per patient. Artificial " "saliva PRN for dry mouth.   10/2: Methylphenidate increased to 36mg. Seroquel decreased to 25mg PRN due to \"vision symptoms\" per patient, largely helpful for anxiety. Started Zyprexa 10mg BID as alternate.      The risks, benefits, alternatives, and side effects were discussed and understood by the patient and guardian.     Medical Assessment and Plan     Medical diagnoses to be addressed this admission:       #UTI, resolved  -Completed Keflex trial     #Nicotine Use Disorder  -Nicotine patch 21mg/24hr patch daily  -Nicotine gum  -Nicotine lozenge     #Migraines, none presently  -Excedrin Migraine 1 tablet daily PRN  -Acetaminophen 650mg q4h PRN     Medical course:  Patient was physically examined by the ED prior to being transferred to the unit and was found to be medically stable and appropriate for admission.      Consults: None     Checklist     Legal Status: Voluntary, guardian as medical decision maker    Safety Assessment:   Behavioral Orders   Procedures    Code 1 - Restrict to Unit    Elopement precautions    Routine Programming     As clinically indicated    Status 15     Every 15 minutes.    Suicide precautions     Patients on Suicide Precautions should have a Combination Diet ordered that includes a Diet selection(s) AND a Behavioral Tray selection for Safe Tray - with utensils, or Safe Tray - NO utensils         Risk Assessment:  Risk for harm is moderate.  Risk factors: SI, maladaptive coping, family dynamics and impulsive  Protective factors: engaged in treatment     SIO: No, low threshold to resume    Disposition: TBD., IRTS referral pending Disposition pending clinical stabilization, medication optimization and development of an appropriate discharge plan.     Attestations     Med Student: Sonali Henriquez, MS3  [facility] Medical Student        I was present with the medical student who participated in the service and documentation of the note. I have verified the history and personally performed the " physical/mental status exam and medical decision making. I agree with the assessment and plan documented in the note.     Caity Lawson  PGY-1 Psychiatry Resident  [facility]    Attestation:  This patient has been seen and evaluated by me, Jayme Cao MD.  I have discussed this patient with the house staff team including the resident and medical student and I agree with the findings and plan in this note.    I have reviewed today's vital signs, medications, labs and imaging. Jayme Cao MD , PhD.

## 2023-10-02 NOTE — PLAN OF CARE
Patient sleeping most of the shift. Patient no complains of pain and discomfort so far. Patient no behavioral issue or safety concerns so far this shift. Patient appears to have 7   total hours of sleep. Will continue with current plan of care. Offer and provide support as needed. Will notify MD with any concerns.     Problem: Sleep Disturbance  Goal: Adequate Sleep/Rest  Outcome: Progressing

## 2023-10-02 NOTE — PROGRESS NOTES
CLINICAL NUTRITION SERVICES - BRIEF NOTE     Nutrition Prescription    Recommendations already ordered by Registered Dietitian (RD):  Modified diet to vegetarian per pt request    Future/Additional Recommendations:  RD to sign off and will remain available by consult if new nutrition concerns arise       REASON FOR ASSESSMENT  Pt requests to modify diet to vegetarian diet.    Findings  Body mass index is 28.47 kg/m .  Wt Readings from Last 6 Encounters:   09/27/23 72.9 kg (160 lb 11.2 oz)   09/24/23 73.1 kg (161 lb 2.5 oz)   09/12/23 74 kg (163 lb 1.6 oz)   07/06/23 72.6 kg (160 lb)   06/16/23 72.6 kg (160 lb)   06/20/22 79.2 kg (174 lb 8 oz)    nutrition and intake is adequate     INTERVENTIONS  Implementation  Modify composition of meals/snacks        Follow up/Monitoring  No nutrition follow-up warranted at this time. RD to sign off. Please consult if further needs arise    Lianet ESQUIVEL  Mental Health Pager (M-F): [phone]  On Call Pager (weekends only): [phone]

## 2023-10-02 NOTE — PROGRESS NOTES
BEH IP Unit Acuity Rating Score (UARS)   Acuity Rating for Station 32N currently located on 4AW as of 6/22/2023.   expanded to stations 20N and 22N as of 9/20/2023  Patient is given one point for every criteria they meet.    CRITERIA SCORING   On a 72 hour hold, court hold, committed, stay of commitment, or revocation 0    Patient LOS on BEH unit exceeds 20 days 0  LOS: 5   Patient under guardianship, 55+, otherwise medically complex, or under age 11 1   Suicide ideation without relief of precipitating factors 0   Current plan for suicide 0   Current plan for homicide 0   Imminent risk or actual attempt to seriously harm another without relief of factors precipitating the attempt 0   Severe dysfunction in daily living (ex: complete neglect for self care, extreme disruption in vegetative function, extreme deterioration in social interactions) 1   Recent (last 7 days) or current physical aggression in the ED or on unit 0   Restraints or seclusion episode in past 72 hours 0   Recent (last 7 days) or current verbal aggression, agitation, yelling, etc., while in the ED or unit 0   Active psychosis 0   Need for constant or near constant redirection (from leaving, from others, etc).  1   Intrusive or disruptive behaviors 1   TOTAL 4

## 2023-10-03 PROCEDURE — 124N000002 HC R&B MH UMMC

## 2023-10-03 PROCEDURE — 250N000013 HC RX MED GY IP 250 OP 250 PS 637

## 2023-10-03 PROCEDURE — 99232 SBSQ HOSP IP/OBS MODERATE 35: CPT | Mod: GC | Performed by: PSYCHIATRY & NEUROLOGY

## 2023-10-03 RX ORDER — LORAZEPAM 0.5 MG/1
0.5 TABLET ORAL 2 TIMES DAILY PRN
Status: DISCONTINUED | OUTPATIENT
Start: 2023-10-03 | End: 2023-10-04

## 2023-10-03 RX ADMIN — OLANZAPINE 10 MG: 10 TABLET, FILM COATED ORAL at 20:59

## 2023-10-03 RX ADMIN — NICOTINE POLACRILEX 2 MG: 2 LOZENGE ORAL at 08:15

## 2023-10-03 RX ADMIN — LORAZEPAM 0.5 MG: 0.5 TABLET ORAL at 12:58

## 2023-10-03 RX ADMIN — NICOTINE 1 PATCH: 21 PATCH, EXTENDED RELEASE TRANSDERMAL at 08:16

## 2023-10-03 RX ADMIN — NICOTINE POLACRILEX 2 MG: 2 LOZENGE ORAL at 19:03

## 2023-10-03 RX ADMIN — NICOTINE POLACRILEX 2 MG: 2 LOZENGE ORAL at 13:50

## 2023-10-03 RX ADMIN — NICOTINE POLACRILEX 2 MG: 2 LOZENGE ORAL at 17:36

## 2023-10-03 RX ADMIN — NICOTINE POLACRILEX 2 MG: 2 LOZENGE ORAL at 11:55

## 2023-10-03 RX ADMIN — NICOTINE POLACRILEX 2 MG: 2 LOZENGE ORAL at 08:58

## 2023-10-03 RX ADMIN — METHYLPHENIDATE HYDROCHLORIDE 36 MG: 36 TABLET, EXTENDED RELEASE ORAL at 08:15

## 2023-10-03 RX ADMIN — NICOTINE POLACRILEX 2 MG: 2 LOZENGE ORAL at 15:10

## 2023-10-03 RX ADMIN — NICOTINE POLACRILEX 2 MG: 2 LOZENGE ORAL at 21:15

## 2023-10-03 RX ADMIN — OLANZAPINE 10 MG: 10 TABLET, FILM COATED ORAL at 08:15

## 2023-10-03 ASSESSMENT — ACTIVITIES OF DAILY LIVING (ADL)
ADLS_ACUITY_SCORE: 28
ORAL_HYGIENE: INDEPENDENT
LAUNDRY: WITH SUPERVISION
ADLS_ACUITY_SCORE: 28
HYGIENE/GROOMING: INDEPENDENT
ADLS_ACUITY_SCORE: 28
DRESS: INDEPENDENT;STREET CLOTHES
ADLS_ACUITY_SCORE: 28
ORAL_HYGIENE: INDEPENDENT
ADLS_ACUITY_SCORE: 28
LAUNDRY: WITH SUPERVISION
ADLS_ACUITY_SCORE: 28
DRESS: INDEPENDENT
ADLS_ACUITY_SCORE: 28
HYGIENE/GROOMING: INDEPENDENT
ADLS_ACUITY_SCORE: 28

## 2023-10-03 NOTE — PLAN OF CARE
"Pt denies SI, has not heara ny psychotic statements.  Pt attended community meeting.  Pt was was disruptive during group, she was telling staff that staff are working just for the money and staff are making her worse. Pt was talking during group.  Staff attempting to redirect pt not to do this.  Pt continued to talk.  After meeting with the MD's pt more appropriate.  Pt not observed taking food off other patients trays.  Pt attended some groups. Pt medication compliant.    Problem: Adult Behavioral Health Plan of Care  Goal: Plan of Care Review  Outcome: Not Progressing  Goal: Patient-Specific Goal (Individualization)  Description: You can add care plan individualizations to a care plan. Examples of Individualization might be:  \"Parent requests to be called daily at 9am for status\", \"I have a hard time hearing out of my right ear\", or \"Do not touch me to wake me up as it startles me\".  Outcome: Not Progressing  Goal: Individualized Daily Interaction Plan (IDIP)  Outcome: Not Progressing  Goal: Adheres to Safety Considerations for Self and Others  Outcome: Not Progressing  Goal: Absence of New-Onset Illness or Injury  Outcome: Not Progressing  Goal: Optimized Coping Skills in Response to Life Stressors  Outcome: Not Progressing  Goal: Develops/Participates in Therapeutic [place] to Support Successful Transition  Outcome: Not Progressing   Goal Outcome Evaluation:                        "

## 2023-10-03 NOTE — CARE PLAN
"BEH Occupational Therapy Group Intervention Note     10/03/23 1445   Group Therapy Session   Group Attendance attended group session   Time Session Began 1315   Time Session Ended 1400   Total Time (minutes) 35 - no charge   Total # Attendees 6   Group Type psychoeducation   Group Topic Covered coping skills/lifestyle management;relapse prevention   Group Session Detail Mental health management group focused on coping skill exploration. Education was provided on maladaptive versus adaptive response to stress /symptoms and use within routine.    Patient Response/Contribution cooperative with task   Patient Participation Detail Pt presented with an eccentric child-like demeanor. Engaged in independent reflection and group discussion for ~20 minutes. Identified ~2-3 healthy coping skills utilized to reduce stress and manage symptoms - ie.: play the xylophone, aromatherapy, and talk with someone. Identified xanax as an unhealthy coping skill of the past. Demonstrated some verbally disruptive behavior as she shouted \"DONE! I'm proud of myself!\" within quite reflection time and challenged writer over small details within discussion. Started to write on L forearm with pen; reluctantly stopped upon writer's request as stated \"It's my body!\" Left pen in group room and excused self shortly after this encounter w/o return.      Selina Harrison OT on 10/3/2023 at 2:46 PM    "

## 2023-10-03 NOTE — PLAN OF CARE
"Goal Outcome Evaluation:  Patient was visible in milieu most of the shift and was observed watching tv with peers. Patient with poor boundaries but redirectable. At dinner time patient finished her dinner and was observed eating left over food from other patient trays. Patient stated \"I want a hamburger, I am hungry, how do I get a hamburger?\" Patient redirected by staff and advised to discuss with provider about getting more portions during meal times. Patient denied pain, anxiety, depression, SI, HI, AVH. Patient was compliant with scheduled medications. PRN nicotine lozenges given throughout the shift. Denies concerns regarding sleep or appetite. Nursing will continue to monitor.       "

## 2023-10-03 NOTE — PROGRESS NOTES
"   10/03/23 1628   Group Therapy Session   Group Attendance attended group session   Time Session Began 1115   Time Session Ended 1200   Total Time (minutes) 35   Total # Attendees 5   Group Type recreation   Group Session Detail Education provided on the importance of healthy leisure and a well-balanced lifestyle in caring for one's mental health.  \"Can You Name 5 Things\" cognitive activity for concentration, cognitive processing, connecting with others, healthy leisure, coping, mood stabilization, reality-based activity, an opportunity to experience success and expanding social skills.   Patient Participation Detail Pt presented with a lot of energy, pressured speech and poor boundaries.  At times pt was intrusive with others and needed cues to not talk over others while it was their turn.  Pt did try to assist others in the group, though pt did this prior to giving them an opportunity to generate responses to the category questions themselves first.  Pt struggled with waiting for their turn and eventually left group early stating they was tired of the activity.  No charge.       "

## 2023-10-03 NOTE — CARE PLAN
10/02/23 2100   Group Therapy Session   Group Attendance attended group session   Time Session Began 2000   Time Session Ended 2045   Total Time (minutes) 45   Total # Attendees 3   Group Type expressive therapy   Group Topic Covered emotions/expression   Patient Response/Contribution cooperative with task       Art Therapy directive is to create art inspired by a chosen positive affirmation(s). Pts were given a handout of positive affirmations and a variety of art media to choose from.  Goals of directive: emotional expression, emotional regulation, media exploration  Pt was a quiet, engaged participant, focused on painting for the entirety of group. Pt finished their watercolor painting and briefly shared with author and group. Pt chose a positive affirmation and created a Christianity symbol which pt says symbolizes life and breath.  Pts mood was calm. More assessment is needed.

## 2023-10-03 NOTE — PLAN OF CARE
Problem: Adult Behavioral Health Plan of Care  Goal: Plan of Care Review  Outcome: Progressing     Problem: Suicidal Behavior  Goal: Suicidal Behavior is Absent or Managed  Outcome: Progressing   Goal Outcome Evaluation:    Pt was visible in the milieu watching TV. Affect bright upon assessment. Stated she is supper anxious today about all that is going on here.  Said she is feeling pretty hopeful. Endorse anxiety 4/10, depression 3/10. Pain 2/10 but refused pain med's. Denies  SI/HI/SIB, visual or  auditory hallucination.Contracted for safety. States she feels safe here. Hygiene is good. Had adequate po intake. Vitals stable.  Prn Nicorette given x 2. Attended OT group.

## 2023-10-03 NOTE — PROGRESS NOTES
BEH IP Unit Acuity Rating Score (UARS)   Acuity Rating for Station 32N currently located on 4AW as of 6/22/2023.   expanded to stations 20N and 22N as of 9/20/2023  Patient is given one point for every criteria they meet.    CRITERIA SCORING   On a 72 hour hold, court hold, committed, stay of commitment, or revocation 0    Patient LOS on BEH unit exceeds 20 days 0  LOS: 6   Patient under guardianship, 55+, otherwise medically complex, or under age 11 1   Suicide ideation without relief of precipitating factors 0   Current plan for suicide 0   Current plan for homicide 0   Imminent risk or actual attempt to seriously harm another without relief of factors precipitating the attempt 0   Severe dysfunction in daily living (ex: complete neglect for self care, extreme disruption in vegetative function, extreme deterioration in social interactions) 1   Recent (last 7 days) or current physical aggression in the ED or on unit 0   Restraints or seclusion episode in past 72 hours 0   Recent (last 7 days) or current verbal aggression, agitation, yelling, etc., while in the ED or unit 0   Active psychosis 0   Need for constant or near constant redirection (from leaving, from others, etc).  1   Intrusive or disruptive behaviors 1   TOTAL 4

## 2023-10-03 NOTE — PLAN OF CARE
Team Note Due:  Thursday    Assessment/Intervention/Current Symtoms and Care Coordination:  Chart review and met with team, discussed pt progress, symptomology, and response to treatment.  Discussed the discharge plan and any potential impediments to discharge.    -  Writer met with pt along with rest of the treatment team to address ongoing behavioral issues that have been reported by staff. Patient was advised to address her concerns directly to staff and to be mindful of her peers right to privacy. Patient agreed to follow unit policy about boundaries and pt's rights to privacy.   - Writer was informed by [facility]  that a phone interview can be conducted on 10/4 at 10am.   - Writer informed pt about upcoming phone interview with [facility]. Also, pt is now open to placement for all [facility] locations.   - Writer called pt's legal guardian and provided update on placement plan and patient being reopened for TCM services with [place]. Legal guardian also informed this writer that she will coordinate pt's psychiatry appointment with [facility]. Legal guardian is trying to schedule a psychiatry assessment to assist pt to apply for social security disability benefits.     - Writer received vm from: [facility], [facility], [facility] stating that the soonest bed available will be until Nov- Dec.       Discharge Plan or Goal:  IRTS      Barriers to Discharge:  Admitted for SI and substance use. Needs clinical stabilization and medication management.      Referral Status:  [facility], and [facility]. [facility], [facility], [facility], [facility], [facility].   Referred to [facility] for TCM services      Legal Status:  Voluntary/Has legal guardian    [facility]     Contacts:  Joellen Page through [facility], [phone]       Upcoming Meetings and Dates/Important Information and next steps:  Send new DA to Joellen  Kaylee through [facility] at [phone]

## 2023-10-03 NOTE — PROGRESS NOTES
"  ----------------------------------------------------------------------------------------------------------  [facility]  Psychiatry Progress Note  Hospital Day #6     Interim History:     The patient's care was discussed with the treatment team and chart notes were reviewed.    Vitals: VSS wnl  Sleep: 6 hours (10/03/23 0600)  Scheduled medications: Patient took all scheduled medications.  Psychiatric PRN medications: No psychiatric prns given    Staff Report:   Patient was visible in milieu most of the shift and was observed watching tv with peers. Patient with poor boundaries but redirectable. At dinner time patient finished her dinner and was observed eating left over food from other patient trays. Patient stated \"I want a hamburger, I am hungry, how do I get a hamburger?\" Patient redirected by staff and advised to discuss with provider about getting more portions during meal times. Patient denied pain, anxiety, depression, SI, HI, AVH. Patient was compliant with scheduled medications. PRN nicotine lozenges given throughout the shift. Denies concerns regarding sleep or appetite. Nursing will continue to monitor.     Subjective:     Patient Interview:  Bria Breen was interviewed with our team in the interview room. A conversation occurred regarding boundaries. Lazaro stated that they are \"ready to leave,\" nothing that they are an \"educated person\" and this unit is not run appropriately. Patient noted that staff are getting paid to help the patients on the unit, the patients are not there to help the staff, noting that they believe staff are having a hard time \"leaving their stuff at home.\" \"If we aren't kissing their asses they get pissed off.\" The information provided by nursing was disclosed to Lazaro, and they reported that they are \"not comfortable here anymore.\" Patient reports that they were informed that they could leave if they were not happy, to which they " "replied \"I didn't come here because I was fucking happy.\" They believe that this should be a safe place where they can speak up and \"adovcate for myself.\" Patient states that \"community meetings used to be fun\" and they \"remember a time when this used to be therapeutic. What happened [place]\" Lazaro shares that \"I am typically a chill person, so if I am getting agitated, it probably isn't me.\" \"I am really not a trouble maker, so this is weird that I am being painted like that.\"     A conversation regarding safety for all patients on the unit was discussed, including being mindful of others on the unit. They note that staff frequently repeats \"boundaries\" to them. They seem to be in agreement with respecting boundaries, given that the alternative is an SIO. The team discussed the plan, including an interview tomorrow morning with [facility] for long term placement, as well as the potential for movement to a crisis bed if accepted by the facility. The potential that a bed may not be immediately available was disclosed. They also requested to move to a different station if that is possible. Lazaro is in agreement with the ultimate goal of getting them to a place with fewer restrictions. They state \"it is hard to stay positive with all of this.\" Patient was informed that they are at/near the top of the wait list for placement, as reported by her guardian.     Patient is requesting a low dose of ativan to calm down. They report \"I know what works for my body...point to ativan.\" A BID PRN for today only was offered and they were in agreement with this plan. They asked for TID once more before leaving her interview.     It was disclosed to patient that they are unable to voluntarily leave as they have a guardian, to which they note \"we need to change that somehow.\" Our team discussed that this was not possible at the time, but could be something they can appeal in the long-term.     ROS:  Patient has no bothersome " "physical symptoms  Patient denies acute concerns     Objective:     Vitals:  /75   Pulse 72   Temp 98.2  F (36.8  C)   Resp 16   Ht 1.6 m (5' 3\")   Wt 72.9 kg (160 lb 11.2 oz)   SpO2 100%   BMI 28.47 kg/m      Allergies:  Allergies   Allergen Reactions    Bee     Hydroxyzine Hcl      Panic attack    Sulfa Antibiotics Rash       Current Medications:  Scheduled:  Current Facility-Administered Medications   Medication Dose Route Frequency    methylphenidate HCl ER (OSM)  36 mg Oral Daily    nicotine  1 patch Transdermal Daily    nicotine   Transdermal Q8H    OLANZapine  10 mg Oral BID       PRN:  Current Facility-Administered Medications   Medication Dose Route Frequency    acetaminophen  650 mg Oral Q4H PRN    alum & mag hydroxide-simethicone  30 mL Oral Q4H PRN    artificial saliva  1 spray Swish & Spit 4x Daily PRN    aspirin-acetaminophen-caffeine  1 tablet Oral Daily PRN    nicotine  2 mg Buccal Q1H PRN    nicotine  2 mg Buccal Q1H PRN    OLANZapine  10 mg Oral TID PRN    Or    OLANZapine  10 mg Intramuscular TID PRN    QUEtiapine  200 mg Oral BID PRN    QUEtiapine  25 mg Oral BID PRN       Labs and Imaging:  New results:   No results found for this or any previous visit (from the past 24 hour(s)).    Data this admission:  - CBC unremarkable  - CMP unremarkable  - TSH normal   - UDS positive for amphetamines, but patient is compliant with their Adderall prescription   - Vit D normal  - Hgb A1c normal  - Lipids unremarkable  - Vit B12 normal  - Folate normal  - Urinalysis on 9/25 revealed increased WBC, large amount of leukocyte esterase urine, increased RBC   - patient taking cephalexin for UTI starting on 9/26  - EKG normal sinus rhythm, QTc 438     Mental Status Exam:     Oriented to:  Grossly Oriented  General:  Awake and Alert  Appearance:  appears stated age and Grooming is inadequate due to marker all over face, smudges of food on clothing  Behavior/Attitude:  Engaged, Guarded, Difficult to " "redirect, and Easily distracted  Eye Contact: Appropriate  Psychomotor: Restless no catatonia present  Speech:  appropriate volume/tone and talkative  Language: Fluent in English with appropriate syntax and vocabulary.  Mood: \"\"ready to leave\"  Affect:  inappropriate, broad/full, exaggerated, and animated  Thought Process:  coherent, goal directed, perseverative, tangential, and racing thoughts  Thought Content:   negative content of hallucinations and distressing hallucinations although quieter than previous; No apparent delusions  Associations:  questionable  Insight:  partial due to ability to recognize need for treatment and acknowledgement of mental health, although fixated on specific treatments and does not express strong understanding of their own mental illness   Judgment:  partial due to above  Impulse control: limited  Attention Span:  adequate for conversation  Concentration:  grossly intact  Recent and Remote Memory:  not formally assessed  Fund of Knowledge: average   Muscle Strength and Tone: normal  Gait and Station: Normal     Psychiatric Assessment   Bria \"Lazaro\" Nuha is a 43 year old nonbinary individualpreviously diagnosed with schizoaffective disorder, borderline personality disorder, ADHD, depression, OCD, body dysmorphia disorder  who presented via 72 hour hold (and voluntary admission by guardian) on 9/27/23 due to concern for SI, anxiety, and significant behavioral change in the context of psychosocial stressors including chronic mental health issues, family dynamics and homelessness, substance use, and medicine non-adherence. Significant symptoms on admission include SI, auditory hallucinations, depressed mood. The MSE on admission was pertinent for engaged but inappropriately friendly behavior, hyperverbal speech, exaggerated affect, partial insight/judgement, innattentiveness, and limited impulse control. Biological contributions to mental health presentation include family history of " mental illness (depression, anxiety) including patient's own experiences with mood dysregulation, polysubstance use, prior challenges with eating disorder resulting in cardiac complications in early 20s, and inconsistent medication adherence with multiple psychotic breaks over 10+ years. Psychological contributions to mental health presentation include partial insight, emotional lability with minimizing behaviors, and life-long challenges with socializing with peers. Social factors contributing to mental health presentation include multiple years of homelessness, reported emotional neglect from parents since childhood (with presently strained relationship, guardianship by mother), difficulties maintaining stable work due to inattentiveness/boredom, legal challenges, and limited mental health follow-up. Protective factors include engagement with treatment.      In summary, the patient's reported symptoms of SI and depressed mood in the context of significant psychosocial stressors - in the setting of present psychosis -  are consistent with a previous diagnosis of Schizoaffective Disorder, depressed type. Patients inattentiveness, impulsivity, difficulty sustaining attention/ completing tasks, and forgetfulness presently and extending through childhood are consistent with a previous diagnosis of ADHD, inattentive type. Patient's history of bizarre behavior (limited boundaries, inappropriate familiarity,  exaggerated speech and affect, using appearance to draw attention, shifting and intermittently shallow emotions) are suggestive of an underlying cluster B personality disorder (histrionic vs BPD) or possibly a manifestation of underlying psychosis however this presently unclear and ultimately challenging to effectively evaluate on an inpatient basis and would therefore benefit from further workup on an outpatient basis.  They will likely benefit from medication management, appropriate therapies, and connection with  "resources this admission.     Given that they currently has SI, AH, and worsening depression as well as impulsivity/poor boundaries , patient warrants inpatient psychiatric hospitalization to maintain her safety.     Psychiatric Plan by Diagnosis      Today's changes:  - Lorazepam, 0.5mg BID PRN due to high anxiety, agitation (temporary)  -Behavioral plan as below    #Schizoaffective disorder, depressive type  1. Medications:  -Seroquel XR 25mg PRN  -Zyprexa 10mg BID  -Lorazepam 0.5mg PRN     #ADHD  -Methylphenidate 36mg daily       Behavioral plan:  -Patient contracted for intrusiveness, unit behaviors. Able to voice agreement to complying with nurses and raising concerns independently, physical space regulations.   -If continuing to be intrusive and disruptive on unit, will restart 1:1 and consider transfer      2. Pertinent Labs/Monitoring:   -Qtc 438     3. Additional Plans:  - Patient will be treated in therapeutic milieu with appropriate individual and group therapies as described     Psychiatric Hospital Course:      Bria Breen was admitted to [facility] as a voluntary patien (per guardian)  Medications:  PTA Adderall & Effexor were restarted.  Patient was started on Seroquel 300mg at bedtime in ED.    9/29: Adderall was discontinued and patient started on Methylphenidate 27mg daily. Venlafaxine titrated to 37.5mg due to \"vision symptoms\" per patient. Artificial saliva PRN for dry mouth.   10/2: Methylphenidate increased to 36mg. Seroquel decreased to 25mg PRN due to \"vision symptoms\" per patient, largely helpful for anxiety. Started Zyprexa 10mg BID as alternate.   10/3: Started Lorazepam 0.5mg PRN for anxiety and restlessness.      The risks, benefits, alternatives, and side effects were discussed and understood by the patient and guardian.     Medical Assessment and Plan     Medical diagnoses to be addressed this admission:       #UTI, resolved  -Completed Keflex trial     #Nicotine Use " Disorder  -Nicotine patch 21mg/24hr patch daily  -Nicotine gum  -Nicotine lozenge     #Migraines, none presently  -Excedrin Migraine 1 tablet daily PRN  -Acetaminophen 650mg q4h PRN     Medical course:  Patient was physically examined by the ED prior to being transferred to the unit and was found to be medically stable and appropriate for admission.      Consults: None     Checklist     Legal Status: Voluntary, guardian as medical decision maker    Safety Assessment:   Behavioral Orders   Procedures    Code 1 - Restrict to Unit    Elopement precautions    Routine Programming     As clinically indicated    Status 15     Every 15 minutes.    Suicide precautions     Patients on Suicide Precautions should have a Combination Diet ordered that includes a Diet selection(s) AND a Behavioral Tray selection for Safe Tray - with utensils, or Safe Tray - NO utensils         Risk Assessment:  Risk for harm is moderate.  Risk factors: SI, maladaptive coping, family dynamics and impulsive  Protective factors: engaged in treatment     SIO: No, low threshold to resume    Disposition: Pending IRTS interview tomorrow with possible discharge to crisis bed tomorrow pending clinical status. Disposition pending clinical stabilization, medication optimization and development of an appropriate discharge plan.     Attestations     Med Student: Sonali Henriquez, MS3  [facility] Medical Student       I was present with the medical student who participated in the service and documentation of the note. I have verified the history and personally performed the physical/mental status exam and medical decision making. I agree with the assessment and plan documented in the note.     Caity Lawson  PGY-1 Psychiatry Resident  [facility]    Attestation:  This patient has been seen and evaluated by me, Jayme Cao MD.  I have discussed this patient with the house staff team including the resident and medical student and I agree with the findings  and plan in this note.    I have reviewed today's vital signs, medications, labs and imaging. Jayme Cao MD , PhD.

## 2023-10-04 VITALS
TEMPERATURE: 97.8 F | DIASTOLIC BLOOD PRESSURE: 102 MMHG | OXYGEN SATURATION: 92 % | HEART RATE: 66 BPM | HEIGHT: 63 IN | SYSTOLIC BLOOD PRESSURE: 146 MMHG | BODY MASS INDEX: 28.65 KG/M2 | RESPIRATION RATE: 20 BRPM | WEIGHT: 161.7 LBS

## 2023-10-04 PROCEDURE — 99232 SBSQ HOSP IP/OBS MODERATE 35: CPT | Mod: GC | Performed by: PSYCHIATRY & NEUROLOGY

## 2023-10-04 PROCEDURE — 124N000002 HC R&B MH UMMC

## 2023-10-04 PROCEDURE — 250N000013 HC RX MED GY IP 250 OP 250 PS 637

## 2023-10-04 RX ORDER — METHYLPHENIDATE HYDROCHLORIDE 36 MG/1
36 TABLET ORAL DAILY
Qty: 30 TABLET | Refills: 0 | Status: CANCELLED | OUTPATIENT
Start: 2023-10-05

## 2023-10-04 RX ORDER — NICOTINE 21 MG/24HR
1 PATCH, TRANSDERMAL 24 HOURS TRANSDERMAL DAILY
Qty: 30 PATCH | Refills: 0 | Status: SHIPPED | OUTPATIENT
Start: 2023-10-05

## 2023-10-04 RX ORDER — OLANZAPINE 10 MG/1
10 TABLET ORAL 2 TIMES DAILY
Qty: 60 TABLET | Refills: 0 | Status: SHIPPED | OUTPATIENT
Start: 2023-10-04

## 2023-10-04 RX ORDER — METHYLPHENIDATE HYDROCHLORIDE 36 MG/1
36 TABLET ORAL EVERY MORNING
Qty: 30 TABLET | Refills: 0 | Status: SHIPPED | OUTPATIENT
Start: 2023-10-04

## 2023-10-04 RX ORDER — SALIVA STIMULANT COMB. NO.3
1 SPRAY, NON-AEROSOL (ML) MUCOUS MEMBRANE 4 TIMES DAILY PRN
Qty: 44.3 ML | Refills: 0 | Status: SHIPPED | OUTPATIENT
Start: 2023-10-04

## 2023-10-04 RX ADMIN — NICOTINE POLACRILEX 2 MG: 2 LOZENGE ORAL at 20:32

## 2023-10-04 RX ADMIN — NICOTINE POLACRILEX 2 MG: 2 LOZENGE ORAL at 15:22

## 2023-10-04 RX ADMIN — NICOTINE POLACRILEX 2 MG: 2 LOZENGE ORAL at 11:03

## 2023-10-04 RX ADMIN — NICOTINE POLACRILEX 2 MG: 2 LOZENGE ORAL at 14:13

## 2023-10-04 RX ADMIN — NICOTINE POLACRILEX 2 MG: 2 LOZENGE ORAL at 17:04

## 2023-10-04 RX ADMIN — NICOTINE POLACRILEX 2 MG: 2 LOZENGE ORAL at 09:14

## 2023-10-04 RX ADMIN — NICOTINE POLACRILEX 2 MG: 2 LOZENGE ORAL at 18:36

## 2023-10-04 RX ADMIN — NICOTINE POLACRILEX 2 MG: 2 LOZENGE ORAL at 12:55

## 2023-10-04 RX ADMIN — OLANZAPINE 10 MG: 10 TABLET, FILM COATED ORAL at 09:13

## 2023-10-04 RX ADMIN — NICOTINE 1 PATCH: 21 PATCH, EXTENDED RELEASE TRANSDERMAL at 09:14

## 2023-10-04 RX ADMIN — METHYLPHENIDATE HYDROCHLORIDE 36 MG: 36 TABLET, EXTENDED RELEASE ORAL at 09:13

## 2023-10-04 RX ADMIN — OLANZAPINE 10 MG: 10 TABLET, FILM COATED ORAL at 21:58

## 2023-10-04 ASSESSMENT — ACTIVITIES OF DAILY LIVING (ADL)
ADLS_ACUITY_SCORE: 38
ADLS_ACUITY_SCORE: 28
ADLS_ACUITY_SCORE: 38
HYGIENE/GROOMING: INDEPENDENT;PROMPTS
LAUNDRY: WITH SUPERVISION
ADLS_ACUITY_SCORE: 38
ADLS_ACUITY_SCORE: 28
ADLS_ACUITY_SCORE: 38
ADLS_ACUITY_SCORE: 28
ADLS_ACUITY_SCORE: 38
ADLS_ACUITY_SCORE: 28
ORAL_HYGIENE: INDEPENDENT
ADLS_ACUITY_SCORE: 38
DRESS: INDEPENDENT

## 2023-10-04 NOTE — PLAN OF CARE
Problem: Sleep Disturbance  Goal: Adequate Sleep/Rest  Outcome: Progressing   Goal Outcome Evaluation:       The night was uneventful; no report of  pain or discomfort. The patient appears to have slept for 6.25 hours. No PRNs were given, and there were no behavioral or safety concerns during this shift. Nursing will continue to monitor and update the care team with any change in condition.     Temp: 97.7  F (36.5  C) Temp src: Oral BP: (!) 154/103 Pulse: 87     SpO2: (!) 78 % O2 Device: None (Room air)
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
## 2023-10-04 NOTE — PLAN OF CARE
Team Note Due:  Thursday    Assessment/Intervention/Current Symtoms and Care Coordination:  Chart review and met with team, discussed pt progress, symptomology, and response to treatment.  Discussed the discharge plan and any potential impediments to discharge.    -  Writer met with pt along with rest of the treatment team to discuss treatment progress and discharge options. Patient is expected to have a phone interview with [employer] at 10:00 AM.     - Writer sent additional IRTS referrals to: [facility], [facility], and [facility].   - Writer called [facility] crisis residence intake line, to inquire on female beds availability. No beds available at this time.   - Writer called [facility] to inquire on crisis bed availability. No beds available at this time.   - Writer received a message from [employer] station that pt has been accepted for their [facility] facility with admission for 10/5 at 10am.   - Writer called legal guardian and provided discharge update and obtained consent to discharge on 10/5.  - Writer called pt's CM and provided update on discharge.   - Writer placed request with care coordinators for therapy, med mgmt, and transportation arrangements.       [facility]     Barriers to Discharge:  None       Referral Status:      Legal Status:  Voluntary/Has legal guardian    [facility]: [facility]     Contacts:  Joellen Page through [facility], [phone]       Upcoming Meetings and Dates/Important Information and next steps:  Discharge to [facility]

## 2023-10-04 NOTE — CARE PLAN
10/04/23 1542   Group Therapy Session   Group Attendance attended group session   Time Session Began 1015   Time Session Ended 1100   Total Time (minutes) 5   Total # Attendees Topic Discussions: Group activity to encourage connection, communication, and self-reflection through discussion topic questions.   Group Type psychoeducation;life skill;community   Group Topic Covered coping skills/lifestyle management;emotions/expression;relationship;structured socialization   Group Session Detail Topic Discussions: Group activity to encourage connection, communication, and self-reflection through discussion topic questions.   Patient Participation Detail Pt briefly joined the last 5 minutes of group to participate in a group discussion on connection and self-reflection. Pt appeared upbeat and social, and was open about verbalizing their opinions prompted from responses from the group, such as beauty standards. Writer was able to redirect pt back to the group discussion, as they continued to comment about beauty and how it affects the group.

## 2023-10-04 NOTE — PROGRESS NOTES
"  ----------------------------------------------------------------------------------------------------------  [facility]  Psychiatry Progress Note  Hospital Day #7     Interim History:     The patient's care was discussed with the treatment team and chart notes were reviewed.    Vitals: VSS wnl  Sleep: 6.25 hours   Scheduled medications: Patient took all scheduled medications.  Psychiatric PRN medications: No psychiatric prns given    Staff Report:   The night was uneventful; no report of pain or discomfort. The patient appears to have slept for 6.25 hours. No PRNs were given, and there were no behavioral or safety concerns during this shift. Nursing will continue to monitor and update the care team with any change in condition.      Subjective:     Patient Interview:  Bria Breen was interviewed with our team in the interview room. Patient states that things are \"pretty good.\" They note they are pleased with who the charge nurse is. Patient feels that they are at \"baseline,\" reporting concentration is \"pretty solid.\"     Lazaro is inquiring as to what plan B would be if they are not accepted by [facility] today, stating \"no more pills.\" They appear nervous for their upcoming appointment, asking for \"mock questions\" from staff. Given example (\"Are you planning on taking your medications while you're here?)\" and patient responds \"Well yes, because I know that they are important for me.\"  Various residential locations were discussed. They feel that the \"pressure is on\" for their interview.     Shakirajordan states \"they are okay\" when asked about the voices in their head, improved from previous. They note that they do not sleep well while on the unit due to the frequent nursing checks at night. Patient denies SI/HI. Shakirajordan explains \"I do not do well\" at morning group meetings, noting, \"rather than trying to fight the energy, I will just skip that one.\" Discussed that this is okay to do so. " "Patient asks why, for example, their friend on the unit could not bring their blanket out to the unit. Discussed that this was due to concerns for bedbugs on the unit previously and to make sure bedding properly returns to the room it belongs to. Patient states \"oh! Well when you explain that it makes sense - I think things should be more like that!\" Patient reports that they do not find rules comforting and inquired about what the rules on the unit are.     Lazaro asked why they continues to bounce between the hospital, homeless shelters, jails, etc. They appeared surprised when informed that this may be due to their impulsivity and concerns with boundaries. A discussion was had regarding continued development of coping skills and ongoing psychiatric care/therapy, patient is agreeable.     ROS:  Patient has no bothersome physical symptoms  Patient denies acute concerns     Objective:     Vitals:  BP (!) 154/103 (BP Location: Right arm)   Pulse 87   Temp 97.7  F (36.5  C) (Oral)   Resp 16   Ht 1.6 m (5' 3\")   Wt 73.3 kg (161 lb 11.2 oz)   SpO2 (!) 78%   BMI 28.64 kg/m      Allergies:  Allergies   Allergen Reactions    Bee     Hydroxyzine Hcl      Panic attack    Sulfa Antibiotics Rash       Current Medications:  Scheduled:  Current Facility-Administered Medications   Medication Dose Route Frequency    methylphenidate HCl ER (OSM)  36 mg Oral Daily    nicotine  1 patch Transdermal Daily    nicotine   Transdermal Q8H    OLANZapine  10 mg Oral BID       PRN:  Current Facility-Administered Medications   Medication Dose Route Frequency    acetaminophen  650 mg Oral Q4H PRN    alum & mag hydroxide-simethicone  30 mL Oral Q4H PRN    artificial saliva  1 spray Swish & Spit 4x Daily PRN    aspirin-acetaminophen-caffeine  1 tablet Oral Daily PRN    LORazepam  0.5 mg Oral BID PRN    nicotine  2 mg Buccal Q1H PRN    nicotine  2 mg Buccal Q1H PRN    OLANZapine  10 mg Oral TID PRN    Or    OLANZapine  10 mg Intramuscular TID " "PRN    QUEtiapine  200 mg Oral BID PRN    QUEtiapine  25 mg Oral BID PRN       Labs and Imaging:  New results:   No results found for this or any previous visit (from the past 24 hour(s)).    Data this admission:  - CBC unremarkable  - CMP unremarkable  - TSH normal   - UDS positive for amphetamines, but patient is compliant with their Adderall prescription   - Vit D normal  - Hgb A1c normal  - Lipids unremarkable  - Vit B12 normal  - Folate normal  - Urinalysis on 9/25 revealed increased WBC, large amount of leukocyte esterase urine, increased RBC   - patient taking cephalexin for UTI starting on 9/26  - EKG normal sinus rhythm, QTc 438     Mental Status Exam:     Oriented to:  Grossly Oriented  General:  Awake and Alert  Appearance:  appears stated age, Dressed in street clothes and Grooming is inadequate due to marker and makeup on face and arms . Wearing lipstick and eyeliner.  Behavior/Attitude:  Engaged, Difficult to redirect, and Easily distracted  Eye Contact: Appropriate  Psychomotor: Restless no catatonia present  Speech:  appropriate volume/tone and talkative, interruptive  Language: Fluent in English with appropriate syntax and vocabulary.  Mood: \"\"pretty good\"  Affect:  congruent with mood, broad/full, exaggerated and animated  Thought Process:  coherent, goal directed, perseverative, tangential and racing thoughts  Thought Content:   negative content of hallucinations and distressing hallucinations although quieter than previous; No apparent delusions  Associations:  questionable  Insight:  partial due to ability to recognize need for treatment and acknowledgement of mental health, although fixated on specific treatments and does not express strong understanding of their own mental illness   Judgment:  partial due to above  Impulse control: limited  Attention Span:  adequate for conversation  Concentration:  grossly intact  Recent and Remote Memory:  not formally assessed  Fund of Knowledge: average " "  Muscle Strength and Tone: normal  Gait and Station: Normal     Psychiatric Assessment   Bria \"Leam\" Nuha is a 43 year old nonbinary individual previously diagnosed with schizoaffective disorder, borderline personality disorder, ADHD, depression, OCD, body dysmorphia disorder  who presented via 72 hour hold (and voluntary admission by guardian) on 9/27/23 due to concern for SI, anxiety, and significant behavioral change in the context of psychosocial stressors including chronic mental health issues, family dynamics and homelessness, substance use, and medicine non-adherence. Significant symptoms on admission include SI, auditory hallucinations, depressed mood. The MSE on admission was pertinent for engaged but inappropriately friendly behavior, hyperverbal speech, exaggerated affect, partial insight/judgement, innattentiveness, and limited impulse control. Biological contributions to mental health presentation include family history of mental illness (depression, anxiety) including patient's own experiences with mood dysregulation, polysubstance use, prior challenges with eating disorder resulting in cardiac complications in early 20s, and inconsistent medication adherence with multiple psychotic breaks over 10+ years. 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 Social factors contributing to mental health presentation include multiple years of homelessness, reported emotional neglect from parents since childhood (with presently strained relationship, guardianship by mother), difficulties maintaining stable work, legal challenges, and limited mental health follow-up. Protective factors include engagement with treatment.      In summary, the patient's reported symptoms of SI and depressed mood in the context of significant psychosocial " stressors - in the setting of present psychosis -  are consistent with a previous diagnosis of Schizoaffective Disorder, depressed type. Patients inattentiveness, impulsivity, difficulty sustaining attention/ completing tasks, and forgetfulness presently and extending through childhood are consistent with a previous diagnosis of ADHD, inattentive type. Patient's history of bizarre behavior (limited boundaries, inappropriate familiarity,  exaggerated speech and affect, using appearance to draw attention, shifting and intermittently shallow emotions) are suggestive of an underlying cluster B personality disorder (histrionic vs BPD) or possibly a manifestation of underlying psychosis however this presently unclear and ultimately challenging to effectively evaluate on an inpatient basis and would therefore benefit from further workup on an outpatient basis.  They will likely benefit from medication management, appropriate therapies, and connection with resources this admission.  Given their initial SI, AH, and worsening depression as well as impulsivity/poor boundaries , patient warrants inpatient psychiatric hospitalization to maintain her safety. All the above have continued to improve this admission and patient is pending IRTS placement, likely discharge tomorrow or Friday.      Psychiatric Plan by Diagnosis      Today's changes:  -Discontinued Lorazepam due to improved anxiety  -Likely IRTS discharge tomorrow per [employer]    #Schizoaffective disorder, depressive type  1. Medications:  -Seroquel XR 25mg PRN  -Zyprexa 10mg BID    #ADHD  -Methylphenidate 36mg daily     Behavioral plan:  -Patient contracted for intrusiveness, unit behaviors. Able to voice agreement to complying with nurses and raising concerns independently, physical space regulations.   -If continuing to be intrusive and disruptive on unit, will restart 1:1   -Crisis beds full, will prioritize IRTS approval and evaluate daily as needed of behavior on  "unit.     2. Pertinent Labs/Monitoring:   -Qtc 438     3. Additional Plans:  - Patient will be treated in therapeutic milieu with appropriate individual and group therapies as described     Psychiatric Hospital Course:      Bria Breen was admitted to [facility] as a voluntary patien (per guardian)  Medications:  PTA Adderall & Effexor were restarted.  Patient was started on Seroquel 300mg at bedtime in ED.    9/29: Adderall was discontinued and patient started on Methylphenidate 27mg daily. Venlafaxine titrated to 37.5mg due to \"vision symptoms\" per patient. Artificial saliva PRN for dry mouth.   10/2: Methylphenidate increased to 36mg. Seroquel decreased to 25mg PRN due to \"vision symptoms\" per patient, largely helpful for anxiety. Started Zyprexa 10mg BID as alternate.   10/3: Started Lorazepam 0.5mg PRN for two doses for anxiety and restlessness (due to significant anxiety, hyperarousal from patient). Discontinued on morning of 10/4.     The risks, benefits, alternatives, and side effects were discussed and understood by the patient and guardian.     Medical Assessment and Plan     Medical diagnoses to be addressed this admission:       #UTI, resolved  -Completed Keflex trial     #Nicotine Use Disorder  -Nicotine patch 21mg/24hr patch daily  -Nicotine gum  -Nicotine lozenge     #Migraines, none presently  -Excedrin Migraine 1 tablet daily PRN  -Acetaminophen 650mg q4h PRN     Medical course:  Patient was physically examined by the ED prior to being transferred to the unit and was found to be medically stable and appropriate for admission.      Consults: None     Checklist     Legal Status: Voluntary, guardian as medical decision maker    Safety Assessment:   Behavioral Orders   Procedures    Code 1 - Restrict to Unit    Elopement precautions    Routine Programming     As clinically indicated    Status 15     Every 15 minutes.    Suicide precautions     Patients on Suicide Precautions should have a Combination " Diet ordered that includes a Diet selection(s) AND a Behavioral Tray selection for Safe Tray - with utensils, or Safe Tray - NO utensils         Risk Assessment:  Risk for harm is moderate.  Risk factors: SI, maladaptive coping, family dynamics and impulsive  Protective factors: engaged in treatment     SIO: No, low threshold to resume    Disposition: Pending IRTS discharge tomorrow.      Attestations     Patient seen and staffed with attending Dr. Kiley Lawson  PGY-1 Psychiatry Resident  [facility]    Attestation:  This patient has been seen and evaluated by me, Jayme Cao MD.  I have discussed this patient with the house staff team including the resident and medical student and I agree with the findings and plan in this note.    I have reviewed today's vital signs, medications, labs and imaging. Jayme Cao MD , PhD.

## 2023-10-04 NOTE — PLAN OF CARE
"Pt frequently struggled with maintaining appropriate boundaries with others on the unit, including staff and patients. Writer had to redirect pt multiple times for making inappropriate comments and requests. For example, pt asked writer to \"have a sleepover\" since she has two beds in her room. She also told writer to drink the soda in her locker and stated she wanted to put lipstick on writer. She was also observed taking food items off another pt's tray. Writer consistently reminded pt the importance of maintaining appropriate boundaries with staff (and patients). Pt appeared receptive but also seemed to struggle with high level of impulsivity. Her speech has been rapid, tangential, and sometimes nonsensical/incoherent. With prompting and writer's assistance, pt cleaned her room and changed the linens on her beds. The plan is for pt to discharge to [email or web address] tomorrow morning around 0930. Pt and guardian made aware and in agreement with disposition plans.    Problem: Adult Behavioral Health Plan of Care  Goal: Plan of Care Review  Recent Flowsheet Documentation  Taken 10/4/2023 1149 by Katya Lopez, RN  Patient Agreement with Plan of Care: agrees     "

## 2023-10-04 NOTE — PLAN OF CARE
"  Problem: Anxiety  Goal: Anxiety Reduction or Resolution  Outcome: Progressing     Problem: Suicidal Behavior  Goal: Suicidal Behavior is Absent or Managed  Outcome: Progressing   Goal Outcome Evaluation:    Pt was visible in the milieu   watching TV with peers. Presents with bright affect. Pt was cooperative with assessment and med compliant. Denies MH issues including SI/SIB/HI auditory and visual hallucination. Alert and oriented x 4. Able to communicate needs. Hyper verbal and loud. Denies pain or discomfort. Stated, \"l  am good, l will be going home tomorrow\". Insight not appropriate to situation.  Pt will be discharging tomorrow. Discharge med's for pt are here, they are in the cupboard. Nicotine given x2. Contracted for safety. No behavioral issues noted. No safety concerns noted.  "

## 2023-10-04 NOTE — PROGRESS NOTES
BEH IP Unit Acuity Rating Score (UARS)   Acuity Rating for Station 32N currently located on 4AW as of 6/22/2023.   expanded to stations 20N and 22N as of 9/20/2023  Patient is given one point for every criteria they meet.    CRITERIA SCORING   On a 72 hour hold, court hold, committed, stay of commitment, or revocation 0    Patient LOS on BEH unit exceeds 20 days 0  LOS: 7   Patient under guardianship, 55+, otherwise medically complex, or under age 11 1   Suicide ideation without relief of precipitating factors 0   Current plan for suicide 0   Current plan for homicide 0   Imminent risk or actual attempt to seriously harm another without relief of factors precipitating the attempt 0   Severe dysfunction in daily living (ex: complete neglect for self care, extreme disruption in vegetative function, extreme deterioration in social interactions) 1   Recent (last 7 days) or current physical aggression in the ED or on unit 0   Restraints or seclusion episode in past 72 hours 0   Recent (last 7 days) or current verbal aggression, agitation, yelling, etc., while in the ED or unit 0   Active psychosis 0   Need for constant or near constant redirection (from leaving, from others, etc).  1   Intrusive or disruptive behaviors 1   TOTAL 4

## 2023-10-05 PROBLEM — F90.0 ADHD (ATTENTION DEFICIT HYPERACTIVITY DISORDER), INATTENTIVE TYPE: Status: ACTIVE | Noted: 2023-10-05

## 2023-10-05 PROBLEM — F20.9 SCHIZOPHRENIA (H): Status: RESOLVED | Noted: 2023-09-12 | Resolved: 2023-10-05

## 2023-10-05 PROBLEM — F19.90 POLYSUBSTANCE USE DISORDER: Status: ACTIVE | Noted: 2023-10-05

## 2023-10-05 PROBLEM — F25.1 SCHIZOAFFECTIVE DISORDER, DEPRESSIVE TYPE (H): Status: ACTIVE | Noted: 2023-10-05

## 2023-10-05 PROCEDURE — 250N000013 HC RX MED GY IP 250 OP 250 PS 637

## 2023-10-05 PROCEDURE — 99238 HOSP IP/OBS DSCHRG MGMT 30/<: CPT | Mod: GC | Performed by: PSYCHIATRY & NEUROLOGY

## 2023-10-05 RX ADMIN — NICOTINE POLACRILEX 2 MG: 2 LOZENGE ORAL at 08:25

## 2023-10-05 RX ADMIN — OLANZAPINE 10 MG: 10 TABLET, FILM COATED ORAL at 08:25

## 2023-10-05 RX ADMIN — METHYLPHENIDATE HYDROCHLORIDE 36 MG: 36 TABLET, EXTENDED RELEASE ORAL at 08:25

## 2023-10-05 RX ADMIN — NICOTINE 1 PATCH: 21 PATCH, EXTENDED RELEASE TRANSDERMAL at 08:24

## 2023-10-05 ASSESSMENT — ACTIVITIES OF DAILY LIVING (ADL)
ADLS_ACUITY_SCORE: 38

## 2023-10-05 NOTE — PLAN OF CARE
Team Note Due:  Thursday    Assessment/Intervention/Current Symtoms and Care Coordination:  Chart review and met with team, discussed pt progress, symptomology, and response to treatment.  Discussed the discharge plan and any potential impediments to discharge.    -  Patient will be discharged to [facility] today, 10/5 at 10am.   - Writer called pt's legal guardian to confirm discharge. Writer was informed by legal guardian that she was able to schedule a psychiatry appointment with [facility] for 10/9.   - AVS completed.       Discharge Plan or Goal:  [facility] - [facility]     Barriers to Discharge:  None       Referral Status:      Legal Status:  Voluntary/Has legal guardian    County: [place]     Contacts:  Joellen Page through [facility], [phone]       Upcoming Meetings and Dates/Important Information and next steps:  Discharge to [facility]

## 2023-10-05 NOTE — PLAN OF CARE
Adult Inpatient Safety Plan:     [facility] Adult Inpatient Mental Health Units           [address]                                Bria Breen     SAFETY PLAN:  Step 1: Warning signs / cues (Thoughts, images, mood, situation, behavior) that a crisis may be developing:  Thoughts: Negative thinking  Behaviors: Lack of interest in working or doing hobbies      Step 2: Coping strategies - Things I can do to take my mind off of my problems without contacting another person (relaxation technique, physical activity):  Distress Tolerance Strategies:   draw, online shopping  Physical Activities: go for a walk    Step 3: Remind myself of people and things that are important to me and worth living for:    Goal to become a pharmacist  Travel    Step 4: When I am in crisis, I can ask these people to help me use my safety plan:   Name: Geraldo    Name: Claudia    Name: Demarco     Step 5: Making the environment safe:   I will remove alcohol and drugs, secure my medications, dispose of old medications, remove access to firearms, remove things I could use to hurt myself, and identify supportive people    Step 6: Professionals or agencies I can contact during a crisis:  National Suicide Prevention Line (www.mentalhealthmn.org): 825-663-IWNZ (6564)  [facility] Crisis (COPE) Response - Adult [phone]  988, 211      If unable to maintain safety despite working your plan, call 911 or go to my nearest emergency department.       Patient helped develop this safety plan and agreed to use it when needed.  Pt has been given a copy of this plan.          Today s date:  October 5, 2023  Completed by Clinician Name/ Credentials:  LAURA Hatch            Adapted from Safety Plan Template 2008 Darcy Zaragoza and Ap Melvin is reprinted with the express permission of the authors.  No portion of the Safety Plan Template may be reproduced without the express, written permission.  You can contact the authors at  alejo@[email or web address] or [email or web address].

## 2023-10-05 NOTE — PLAN OF CARE
Pt discharging today to [facility] facility. AVS was given and discussed with pt; they communicated understanding. They denied any active SI/SIB thoughts or urges; no acute safety concerns at this time. Pt was discharged with medications from  pharmacy. Their valuables and belongings were returned to them. Around 0950, writer escorted pt to [facility] where a cab was waiting.     Problem: Adult Behavioral Health Plan of Care  Goal: Plan of Care Review  Outcome: Adequate for Care Transition

## 2023-10-05 NOTE — PLAN OF CARE
No PRNs given or requested this shift.  No concerns noted this shift. Appears to have slept for 6.50 hours. Will continue to monitor and offer support.      Problem: Sleep Disturbance  Goal: Adequate Sleep/Rest  Outcome: Progressing   Goal Outcome Evaluation:

## 2023-10-13 ENCOUNTER — HOSPITAL ENCOUNTER (EMERGENCY)
Facility: CLINIC | Age: 43
End: 2023-10-13
Payer: MEDICAID

## 2023-10-13 ENCOUNTER — HOSPITAL ENCOUNTER (EMERGENCY)
Facility: CLINIC | Age: 43
Discharge: HOME | End: 2023-10-13
Attending: STUDENT IN AN ORGANIZED HEALTH CARE EDUCATION/TRAINING PROGRAM | Admitting: STUDENT IN AN ORGANIZED HEALTH CARE EDUCATION/TRAINING PROGRAM
Payer: MEDICAID

## 2023-10-13 VITALS
BODY MASS INDEX: 25.61 KG/M2 | OXYGEN SATURATION: 96 % | HEART RATE: 82 BPM | HEIGHT: 64 IN | TEMPERATURE: 97.4 F | RESPIRATION RATE: 18 BRPM | SYSTOLIC BLOOD PRESSURE: 133 MMHG | DIASTOLIC BLOOD PRESSURE: 83 MMHG | WEIGHT: 150 LBS

## 2023-10-13 DIAGNOSIS — G43.809 OTHER MIGRAINE WITHOUT STATUS MIGRAINOSUS, NOT INTRACTABLE: ICD-10-CM

## 2023-10-13 PROCEDURE — 96374 THER/PROPH/DIAG INJ IV PUSH: CPT

## 2023-10-13 PROCEDURE — 258N000003 HC RX IP 258 OP 636: Performed by: STUDENT IN AN ORGANIZED HEALTH CARE EDUCATION/TRAINING PROGRAM

## 2023-10-13 PROCEDURE — 96361 HYDRATE IV INFUSION ADD-ON: CPT

## 2023-10-13 PROCEDURE — 99284 EMERGENCY DEPT VISIT MOD MDM: CPT | Mod: 25

## 2023-10-13 PROCEDURE — 250N000011 HC RX IP 250 OP 636: Performed by: EMERGENCY MEDICINE

## 2023-10-13 PROCEDURE — 250N000011 HC RX IP 250 OP 636: Mod: JZ | Performed by: STUDENT IN AN ORGANIZED HEALTH CARE EDUCATION/TRAINING PROGRAM

## 2023-10-13 RX ORDER — MAGNESIUM SULFATE HEPTAHYDRATE 40 MG/ML
2 INJECTION, SOLUTION INTRAVENOUS ONCE
Status: COMPLETED | OUTPATIENT
Start: 2023-10-13 | End: 2023-10-13

## 2023-10-13 RX ORDER — DEXAMETHASONE SODIUM PHOSPHATE 10 MG/ML
10 INJECTION, SOLUTION INTRAMUSCULAR; INTRAVENOUS ONCE
Status: COMPLETED | OUTPATIENT
Start: 2023-10-13 | End: 2023-10-13

## 2023-10-13 RX ORDER — METOCLOPRAMIDE HYDROCHLORIDE 5 MG/ML
10 INJECTION INTRAMUSCULAR; INTRAVENOUS ONCE
Status: COMPLETED | OUTPATIENT
Start: 2023-10-13 | End: 2023-10-13

## 2023-10-13 RX ORDER — ONDANSETRON 4 MG/1
4 TABLET, ORALLY DISINTEGRATING ORAL ONCE
Status: COMPLETED | OUTPATIENT
Start: 2023-10-13 | End: 2023-10-13

## 2023-10-13 RX ADMIN — METOCLOPRAMIDE 10 MG: 5 INJECTION, SOLUTION INTRAMUSCULAR; INTRAVENOUS at 05:26

## 2023-10-13 RX ADMIN — ONDANSETRON 4 MG: 4 TABLET, ORALLY DISINTEGRATING ORAL at 02:43

## 2023-10-13 RX ADMIN — SODIUM CHLORIDE 1000 ML: 9 INJECTION, SOLUTION INTRAVENOUS at 05:23

## 2023-10-13 RX ADMIN — SODIUM CHLORIDE 1000 ML: 9 INJECTION, SOLUTION INTRAVENOUS at 07:33

## 2023-10-13 ASSESSMENT — ACTIVITIES OF DAILY LIVING (ADL)
ADLS_ACUITY_SCORE: 35
ADLS_ACUITY_SCORE: 35

## 2023-10-13 NOTE — ED PROVIDER NOTES
"  History     Chief Complaint:  Headache       HPI   Bria Breen is a 43 year old adult with a history of migraines who presents with a left-sided headache since a few hours ago. They took a dose of Excedrin prior to arrival with no relief. Other symptoms mentioned include an aura, blurry vision, and vomiting. Additionally, they feels that they are dehydrated. They is not currently undergoing hormone therapy. They deny hemoptysis, fevers, neck pain, or photophobia.     Independent Historian:   None - Patient Only    Review of External Notes:   None    Medications:  '  Concerta   Nicoderm   Zyprexa     Past Medical History:    Anxiety   Depressive disorder   Suicide attempt  Migraine   Cocaine abuse, uncomplicated     Mood disorder     ADHD     Schizoaffective disorder, unspecified type     Bulimia nervosa     Hyperlipidemia   Nicotine dependence  Body dysmorphic disorder   OCD (obsessive compulsive disorder)   Eating disorder- bulimia, binge eating     Anxiety state, unspecified       Migraine, unspecified, with intractable migraine, so stated, without mention of status migrainosus      Past Surgical History:    Back lipoma resection   Rhinoplasty   Septoplasty     Physical Exam   Patient Vitals for the past 24 hrs:   BP Temp Temp src Pulse Resp SpO2 Height Weight   10/13/23 0725 133/83 -- -- 82 -- 96 % -- --   10/13/23 0723 -- -- -- -- -- 97 % -- --   10/13/23 0235 (!) 181/107 97.4  F (36.3  C) Temporal 66 18 96 % 1.626 m (5' 4\") 68 kg (150 lb)        Physical Exam  General: Awake, alert, in no acute distress   HEENT: Atraumatic   EOM normal   External ears normal   Trachea midline  Neck: Supple, normal ROM  CV: Regular rate and rhythm   No murmur   No lower extremity edema  2+ radial pulses  PULM: Breath sounds normal bilaterally. No wheezes or rales  ABD: Soft, non-tender, non-distended. Normal bowel sounds   No rebound or guarding   MSK: No gross deformities  NEURO: Alert, no focal deficits. 5/5 strength in " bilateral upper and lower extremities.  No gross sensory deficits.  Patient moves in a coordinated fashion.  Cranial nerves 2-12 intact.  Cerebellar testing intact.  Walks with steady gait  Skin: Warm, dry and intact      Emergency Department Course   Procedures   None    Emergency Department Course & Assessments:             Interventions:  Medications   ondansetron (ZOFRAN ODT) ODT tab 4 mg (4 mg Oral $Given 10/13/23 0243)   metoclopramide (REGLAN) injection 10 mg (10 mg Intravenous $Given 10/13/23 0526)   sodium chloride 0.9% BOLUS 1,000 mL (0 mLs Intravenous Stopped 10/13/23 0623)   magnesium sulfate 2 g in 50 mL sterile water intermittent infusion (2 g Intravenous Not Given 10/13/23 0722)   dexAMETHasone PF (DECADRON) injection 10 mg (10 mg Intravenous Not Given 10/13/23 0722)   sodium chloride 0.9% BOLUS 1,000 mL (0 mLs Intravenous Stopped 10/13/23 0913)        Assessments:  See ED course      Independent Interpretation (X-rays, CTs, rhythm strip):  None    Consultations/Discussion of Management or Tests:  None   ED Course as of 10/13/23 0940   Fri Oct 13, 2023   0622 Recheck.  Pain went down from a 10 to a 7.   0730 Evaluation.  Patient is requesting more fluids.  Declining Decadron and magnesium.   0844 Patient left before AVS could be printed.       Social Determinants of Health affecting care:   None    Disposition:  The patient was discharged to home.     Impression & Plan    CMS Diagnoses: None        Medical Decision Making:  Vitals WNL on arrival  This is a patient that presents with migraine that is typical for the  No infectious signs or symptoms.  No meningeal signs  Ordered migraine cocktail as well as multitude of other migraine adjuncts including magnesium, Decadron all of which patient declines.  They are requesting only IV fluids  The patient received 2 L IV fluids in the ED before removing their own IV which I found lying on the ground on a reevaluation  The patient is telling me they only  "received 1 L of fluids.  Nursing staff is certain the patient received at least 2 L.  The patient actually left the room to come speak with me directly.  Despite attempts at verbal de-escalation and request for them to return to their room to protect other patients privacy, the patient remained frustrated stating \"they are lying\"  I told the patient we can give him another liter of fluids.  They did go back to the room willingly although when they arrived there, the nurse said that the patient again changed their mind and left.  Security escorted them out  The patient is alert, oriented, able to make her own decisions.  Simply left before AVS was able to be printed.  Was planning on discharging this patient given a normal neurologic exam without any red flag signs or symptoms for more serious intracranial pathology        Diagnosis:    ICD-10-CM    1. Other migraine without status migrainosus, not intractable  G43.809                 Susana Muñoz, DO  10/13/2023   Susana Guan, Susana Ortiz, DO  10/13/23 0940    "

## 2023-10-13 NOTE — ED NOTES
Writer RN called and spoke with patient's legal guardian Zita Castro ([phone]) and gave update on patient.

## 2023-10-13 NOTE — ED NOTES
Second RN went into patient room to start an IV, and give another bag of fluids. Security present at bedside.

## 2023-10-13 NOTE — ED NOTES
"Writer RN went into room to stop pt's second bag of fluid. The bag was empty, and patient had pulled the IV. Pt then stated that they never got the second bag of fluid. Writer RN provided education that this was the second bag of fluid, and that the other one had been given by the previous RN. Pt then stated, \"this is fucking bullshit, I am not leaving until I get the second bag of fluid\". MD updated.   "

## 2023-10-13 NOTE — ED NOTES
Pt came out of the room into staff area, demanding to talk to MD. Pt instructed to go back into the room, and that the MD would come speak to them. Pt continued to walk in staff area, and walked over to MD. Pt asking MD for another bag of fluids, and MD instructed pt to go back into room, and that they will get a third bag of fluids. Pt then asking for a new nurse. Pt was then asked to go back into room again, and would not. Security was present, and walked patient back to room.

## 2023-10-13 NOTE — ED TRIAGE NOTES
2 hours ago onset of headache, R side of head with light sensitivity and nausea. Denies hx of migraines. Denies any trauma     Triage Assessment (Adult)       Row Name 10/13/23 0233          Triage Assessment    Airway WDL WDL        Respiratory WDL    Respiratory WDL WDL        Skin Circulation/Temperature WDL    Skin Circulation/Temperature WDL WDL        Cardiac WDL    Cardiac WDL WDL        Peripheral/Neurovascular WDL    Peripheral Neurovascular WDL WDL        Cognitive/Neuro/Behavioral WDL    Cognitive/Neuro/Behavioral WDL WDL

## 2023-10-13 NOTE — ED NOTES
"Pt in staff only area, asked multiple times to go back into room, security called, RN offered to start IV an administer a third bag of fluids, security escorted pt back into room, pt then walks out pt/visitor door, pt reports \"no I am good, I am out of here\"   "

## 2024-02-04 ENCOUNTER — HEALTH MAINTENANCE LETTER (OUTPATIENT)
Age: 44
End: 2024-02-04

## 2024-09-01 ENCOUNTER — HEALTH MAINTENANCE LETTER (OUTPATIENT)
Age: 44
End: 2024-09-01

## 2024-09-25 ENCOUNTER — TELEPHONE (OUTPATIENT)
Dept: FAMILY MEDICINE | Facility: CLINIC | Age: 44
End: 2024-09-25
Payer: COMMERCIAL

## 2024-09-25 NOTE — TELEPHONE ENCOUNTER
Patient has not been seen at [facility] for over a decade.    Thanks,  BETHANY Espana  [facility]

## 2024-09-25 NOTE — TELEPHONE ENCOUNTER
Propranolol     Last Written Prescription Date:    Last Fill Quantity: ,   # refills:   Last Office Visit:   Future Office visit:       Routing refill request to provider for review/approval because:  Drug not active on patient's medication list

## 2024-12-07 ENCOUNTER — HOSPITAL ENCOUNTER (OUTPATIENT)
Facility: CLINIC | Age: 44
Setting detail: OBSERVATION
Discharge: HOME | End: 2024-12-08
Attending: EMERGENCY MEDICINE | Admitting: EMERGENCY MEDICINE
Payer: COMMERCIAL

## 2024-12-07 DIAGNOSIS — F19.90 POLYSUBSTANCE USE DISORDER: ICD-10-CM

## 2024-12-07 DIAGNOSIS — F25.1 SCHIZOAFFECTIVE DISORDER, DEPRESSIVE TYPE (H): Primary | ICD-10-CM

## 2024-12-07 PROBLEM — G43.809 OTHER MIGRAINE WITHOUT STATUS MIGRAINOSUS, NOT INTRACTABLE: Status: ACTIVE | Noted: 2024-12-07

## 2024-12-07 PROBLEM — F32.9 MAJOR DEPRESSION: Status: ACTIVE | Noted: 2024-12-07

## 2024-12-07 PROBLEM — F32.A DEPRESSION, UNSPECIFIED DEPRESSION TYPE: Status: ACTIVE | Noted: 2024-12-07

## 2024-12-07 PROCEDURE — 99223 1ST HOSP IP/OBS HIGH 75: CPT | Mod: 95 | Performed by: NURSE PRACTITIONER

## 2024-12-07 PROCEDURE — G0378 HOSPITAL OBSERVATION PER HR: HCPCS

## 2024-12-07 PROCEDURE — 96361 HYDRATE IV INFUSION ADD-ON: CPT

## 2024-12-07 PROCEDURE — 96375 TX/PRO/DX INJ NEW DRUG ADDON: CPT

## 2024-12-07 PROCEDURE — 250N000012 HC RX MED GY IP 250 OP 636 PS 637: Performed by: NURSE PRACTITIONER

## 2024-12-07 PROCEDURE — 250N000011 HC RX IP 250 OP 636: Performed by: EMERGENCY MEDICINE

## 2024-12-07 PROCEDURE — 258N000003 HC RX IP 258 OP 636: Performed by: EMERGENCY MEDICINE

## 2024-12-07 PROCEDURE — 250N000013 HC RX MED GY IP 250 OP 250 PS 637: Performed by: NURSE PRACTITIONER

## 2024-12-07 PROCEDURE — 250N000013 HC RX MED GY IP 250 OP 250 PS 637: Performed by: EMERGENCY MEDICINE

## 2024-12-07 PROCEDURE — 99285 EMERGENCY DEPT VISIT HI MDM: CPT

## 2024-12-07 PROCEDURE — 96374 THER/PROPH/DIAG INJ IV PUSH: CPT

## 2024-12-07 RX ORDER — METOCLOPRAMIDE HYDROCHLORIDE 5 MG/ML
10 INJECTION INTRAMUSCULAR; INTRAVENOUS ONCE
Status: COMPLETED | OUTPATIENT
Start: 2024-12-07 | End: 2024-12-07

## 2024-12-07 RX ORDER — ARIPIPRAZOLE 5 MG/1
5 TABLET ORAL
COMMUNITY
Start: 2024-08-06

## 2024-12-07 RX ORDER — BENZTROPINE MESYLATE 0.5 MG/1
0.5 TABLET ORAL AT BEDTIME
Status: DISCONTINUED | OUTPATIENT
Start: 2024-12-07 | End: 2024-12-08 | Stop reason: HOSPADM

## 2024-12-07 RX ORDER — ACETAMINOPHEN 325 MG/1
975 TABLET ORAL ONCE
Status: COMPLETED | OUTPATIENT
Start: 2024-12-07 | End: 2024-12-07

## 2024-12-07 RX ORDER — ONDANSETRON 4 MG/1
4 TABLET, ORALLY DISINTEGRATING ORAL ONCE
Status: COMPLETED | OUTPATIENT
Start: 2024-12-07 | End: 2024-12-07

## 2024-12-07 RX ORDER — ARIPIPRAZOLE 10 MG/1
10 TABLET ORAL DAILY
COMMUNITY
Start: 2024-08-05

## 2024-12-07 RX ORDER — BUPRENORPHINE 8 MG/1
8 TABLET SUBLINGUAL 2 TIMES DAILY
COMMUNITY
Start: 2024-10-17

## 2024-12-07 RX ORDER — VENLAFAXINE HYDROCHLORIDE 75 MG/1
75 CAPSULE, EXTENDED RELEASE ORAL DAILY
Status: DISCONTINUED | OUTPATIENT
Start: 2024-12-08 | End: 2024-12-08 | Stop reason: HOSPADM

## 2024-12-07 RX ORDER — PROPRANOLOL HCL 20 MG
1 TABLET ORAL 2 TIMES DAILY
COMMUNITY
Start: 2024-10-02

## 2024-12-07 RX ORDER — VENLAFAXINE HYDROCHLORIDE 75 MG/1
1 CAPSULE, EXTENDED RELEASE ORAL DAILY
COMMUNITY
Start: 2024-07-01 | End: 2025-11-25

## 2024-12-07 RX ORDER — ARIPIPRAZOLE 5 MG/1
5 TABLET ORAL DAILY
Status: DISCONTINUED | OUTPATIENT
Start: 2024-12-07 | End: 2024-12-08 | Stop reason: HOSPADM

## 2024-12-07 RX ORDER — IBUPROFEN 600 MG/1
600 TABLET, FILM COATED ORAL EVERY 6 HOURS PRN
Status: DISCONTINUED | OUTPATIENT
Start: 2024-12-07 | End: 2024-12-08 | Stop reason: HOSPADM

## 2024-12-07 RX ORDER — LOSARTAN POTASSIUM 25 MG/1
1 TABLET ORAL DAILY
COMMUNITY
Start: 2024-12-04 | End: 2025-12-04

## 2024-12-07 RX ORDER — METHYLPHENIDATE HYDROCHLORIDE 18 MG/1
36 TABLET ORAL EVERY MORNING
Status: DISCONTINUED | OUTPATIENT
Start: 2024-12-08 | End: 2024-12-08

## 2024-12-07 RX ORDER — POLYETHYLENE GLYCOL 3350 17 G
4 POWDER IN PACKET (EA) ORAL
Status: DISCONTINUED | OUTPATIENT
Start: 2024-12-07 | End: 2024-12-08 | Stop reason: HOSPADM

## 2024-12-07 RX ORDER — DEXAMETHASONE SODIUM PHOSPHATE 10 MG/ML
10 INJECTION, SOLUTION INTRAMUSCULAR; INTRAVENOUS ONCE
Status: COMPLETED | OUTPATIENT
Start: 2024-12-07 | End: 2024-12-07

## 2024-12-07 RX ORDER — BENZTROPINE MESYLATE 0.5 MG/1
0.5 TABLET ORAL AT BEDTIME
COMMUNITY
Start: 2024-04-01

## 2024-12-07 RX ORDER — ONDANSETRON 4 MG/1
4 TABLET, ORALLY DISINTEGRATING ORAL EVERY 6 HOURS PRN
Status: DISCONTINUED | OUTPATIENT
Start: 2024-12-07 | End: 2024-12-08 | Stop reason: HOSPADM

## 2024-12-07 RX ORDER — OLANZAPINE 5 MG/1
5 TABLET, ORALLY DISINTEGRATING ORAL 4 TIMES DAILY PRN
Status: DISCONTINUED | OUTPATIENT
Start: 2024-12-07 | End: 2024-12-08 | Stop reason: HOSPADM

## 2024-12-07 RX ORDER — PROPRANOLOL HYDROCHLORIDE 10 MG/1
10 TABLET ORAL 2 TIMES DAILY
Status: DISCONTINUED | OUTPATIENT
Start: 2024-12-07 | End: 2024-12-08 | Stop reason: HOSPADM

## 2024-12-07 RX ORDER — POLYETHYLENE GLYCOL 3350 17 G
2 POWDER IN PACKET (EA) ORAL
Status: DISCONTINUED | OUTPATIENT
Start: 2024-12-07 | End: 2024-12-07

## 2024-12-07 RX ORDER — BUPRENORPHINE 8 MG/1
8 TABLET SUBLINGUAL ONCE
Status: COMPLETED | OUTPATIENT
Start: 2024-12-07 | End: 2024-12-07

## 2024-12-07 RX ORDER — LOSARTAN POTASSIUM 25 MG/1
25 TABLET ORAL DAILY
Status: DISCONTINUED | OUTPATIENT
Start: 2024-12-07 | End: 2024-12-08 | Stop reason: HOSPADM

## 2024-12-07 RX ORDER — OLANZAPINE 10 MG/2ML
10 INJECTION, POWDER, FOR SOLUTION INTRAMUSCULAR 2 TIMES DAILY PRN
Status: DISCONTINUED | OUTPATIENT
Start: 2024-12-07 | End: 2024-12-08 | Stop reason: HOSPADM

## 2024-12-07 RX ORDER — KETOROLAC TROMETHAMINE 15 MG/ML
15 INJECTION, SOLUTION INTRAMUSCULAR; INTRAVENOUS ONCE
Status: COMPLETED | OUTPATIENT
Start: 2024-12-07 | End: 2024-12-07

## 2024-12-07 RX ORDER — TRAZODONE HYDROCHLORIDE 50 MG/1
50 TABLET, FILM COATED ORAL
Status: DISCONTINUED | OUTPATIENT
Start: 2024-12-07 | End: 2024-12-08 | Stop reason: HOSPADM

## 2024-12-07 RX ORDER — PROPRANOLOL HYDROCHLORIDE 10 MG/1
1 TABLET ORAL
COMMUNITY
Start: 2024-04-01

## 2024-12-07 RX ADMIN — KETOROLAC TROMETHAMINE 15 MG: 15 INJECTION, SOLUTION INTRAMUSCULAR; INTRAVENOUS at 14:04

## 2024-12-07 RX ADMIN — LOSARTAN POTASSIUM 25 MG: 25 TABLET, FILM COATED ORAL at 23:29

## 2024-12-07 RX ADMIN — BENZTROPINE MESYLATE 0.5 MG: 0.5 TABLET ORAL at 22:40

## 2024-12-07 RX ADMIN — PROPRANOLOL HYDROCHLORIDE 10 MG: 10 TABLET ORAL at 22:41

## 2024-12-07 RX ADMIN — NICOTINE POLACRILEX 4 MG: 2 LOZENGE ORAL at 22:39

## 2024-12-07 RX ADMIN — ACETAMINOPHEN 975 MG: 325 TABLET, FILM COATED ORAL at 10:26

## 2024-12-07 RX ADMIN — DEXAMETHASONE SODIUM PHOSPHATE 10 MG: 10 INJECTION, SOLUTION INTRAMUSCULAR; INTRAVENOUS at 14:06

## 2024-12-07 RX ADMIN — SODIUM CHLORIDE 1000 ML: 9 INJECTION, SOLUTION INTRAVENOUS at 14:03

## 2024-12-07 RX ADMIN — METOCLOPRAMIDE 10 MG: 5 INJECTION, SOLUTION INTRAMUSCULAR; INTRAVENOUS at 14:06

## 2024-12-07 RX ADMIN — BUPRENORPHINE HCL 8 MG: 8 TABLET SUBLINGUAL at 22:39

## 2024-12-07 RX ADMIN — ONDANSETRON 4 MG: 4 TABLET, ORALLY DISINTEGRATING ORAL at 10:27

## 2024-12-07 ASSESSMENT — COLUMBIA-SUICIDE SEVERITY RATING SCALE - C-SSRS
1. IN THE PAST MONTH, HAVE YOU WISHED YOU WERE DEAD OR WISHED YOU COULD GO TO SLEEP AND NOT WAKE UP?: NO
2. HAVE YOU ACTUALLY HAD ANY THOUGHTS OF KILLING YOURSELF IN THE PAST MONTH?: NO
6. HAVE YOU EVER DONE ANYTHING, STARTED TO DO ANYTHING, OR PREPARED TO DO ANYTHING TO END YOUR LIFE?: NO

## 2024-12-07 ASSESSMENT — ACTIVITIES OF DAILY LIVING (ADL)
ADLS_ACUITY_SCORE: 47
DEPENDENT_IADLS:: TRANSPORTATION

## 2024-12-07 NOTE — DISCHARGE INSTRUCTIONS
Your Upcoming Appointment  Type: Therapy - Initial (In-Person)  Date: Wednesday, 12/11/2024  Time: 1:00 pm - 2:00 pm  Provider: Cruzito GAMBOA  LICSW  Location: [facility], [facility], [address], [address], [place] [address]  Phone: [phone]  Patient instructions  New Client Paperwork can be downloaded at [email or web address]    Type: Medication Mgmt - Initial (In-Person)  Date: Thursday, 12/26/2024  Time: 2:00 pm - 3:00 pm  Provider: Winsome Diop  MSN  CNP,PMHNP,RN  Location: [facility], [address] [place] [address], [address], [place] [address]  Phone: [phone]  Patient instructions: Accepts patients between the ages of 18-72yrs. Primary Scope of practice is Mental Health Medication management, not therapy. [facility] providers do not specialize in treating Dementia/Alzheimers patients. Please refrain from scheduling patients with these diagnoses.    [facility]     [facility] provides bridging services to people with Opiate Use Disorders (OUD) seeking care. This is a front door to Medication Assisted Treatments (MAT), Peer Recovery and Nursing services and referrals to Substance Use Disorder (ESCOBAR) Assessments.      There are three ways for people to access the [facility]:   1. People being seen for Opiate Use Disorder (OUD) withdrawal in the [facility] Emergency Department can be referred by that care team   2. People receiving care in a [facility] system can be referred by their provider   3. People who use the walk-in hours Important Information: People ages 16 years and older are served.     Open 5 days weekly   Walk In hours:   Monday, Wednesdays & Fridays 9-11:00 AM & 1-3:00 PM   Tuesdays & Thursdays 1:30 PM to 4:00 PM      Location: [facility], [address], [address], [place] [address] (next to outpatient lab)   Phone: [phone]      Please note: People in withdrawal and/or  need medical attention should go to the Emergency Departments.    [place] Department of [facility]'s Office of Health Facility Complaints (OHFC)  Call [phone] for [facility] facilities, or [phone] for [facility]   Office of [place] for Long-Term Care  Call [phone] for metro area residents, or look up a regional budsman   Office of [place] for Mental Health and Developmental Disabilities (OMHDD)  Call [phone] or [phone], email [email or web address] or mail to [address] [place] [address], [address] , [place] [address]   [place] [employer] [place]  File a complaint online, by email at [email or web address]., or by phone at [phone] or [phone]   [place]   File a [place]/Nonprofit Report Form to address concerns with charitable or nonprofit organizations       Volunteer  Network ([facility])  [phone] (intake by phone available Monday through Thursday from 10 a.m. to 1 p.m.)  Statewide legal services available for low-income people through a network of legal clinics and volunteer attorneys. [place] legal services available. Assistance can range from brief advice to screening for full representation depending on income level, county of residence, legal issue, and merits of the case.  Clinic assistance is available for family, civil, housing, immigration, criminal expungement, bankruptcy, LGBTQ+, and youth legal issues. Visit [facility]'s website for a current clinic schedule.  Phone legal assistance is also available in the following areas:  Administrative Law (DHS disqualifications)  Bankruptcy (Chapter 7, Adversary Proceedings, Student loan hardship discharge)  Civil (Consumer, Debtor-Creditor)  Employment  Family  Landlord-tenant  Real Estate  Immigration  Tax (State and Federal, once  has declined to assist)  [facility] Legal Services ([place] Office)  [phone]; New Clients:  [phone] (4-104-CV-[place]-LAW)  [address], [address], [place] [address]  Free civil (non-criminal) legal assistance to low-income people in many areas of law including housing, family law, government benefits, domestic abuse, immigration, disability and others.
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
## 2024-12-08 VITALS
OXYGEN SATURATION: 97 % | HEART RATE: 94 BPM | DIASTOLIC BLOOD PRESSURE: 103 MMHG | BODY MASS INDEX: 31.38 KG/M2 | RESPIRATION RATE: 18 BRPM | TEMPERATURE: 98.6 F | SYSTOLIC BLOOD PRESSURE: 168 MMHG | WEIGHT: 182.8 LBS

## 2024-12-08 PROCEDURE — G0378 HOSPITAL OBSERVATION PER HR: HCPCS

## 2024-12-08 PROCEDURE — 250N000013 HC RX MED GY IP 250 OP 250 PS 637: Performed by: NURSE PRACTITIONER

## 2024-12-08 PROCEDURE — 99238 HOSP IP/OBS DSCHRG MGMT 30/<: CPT | Performed by: NURSE PRACTITIONER

## 2024-12-08 RX ORDER — BENZTROPINE MESYLATE 0.5 MG/1
1 TABLET ORAL AT BEDTIME
Qty: 30 TABLET | Refills: 0 | Status: SHIPPED | OUTPATIENT
Start: 2024-12-08

## 2024-12-08 RX ORDER — BUPRENORPHINE 8 MG/1
8 TABLET SUBLINGUAL 2 TIMES DAILY
Qty: 20 TABLET | Refills: 0 | Status: SHIPPED | OUTPATIENT
Start: 2024-12-08

## 2024-12-08 RX ORDER — ARIPIPRAZOLE 5 MG/1
5 TABLET ORAL DAILY
Qty: 30 TABLET | Refills: 0 | Status: SHIPPED | OUTPATIENT
Start: 2024-12-08

## 2024-12-08 RX ORDER — VENLAFAXINE HYDROCHLORIDE 75 MG/1
75 CAPSULE, EXTENDED RELEASE ORAL DAILY
Qty: 30 CAPSULE | Refills: 0 | Status: SHIPPED | OUTPATIENT
Start: 2024-12-08

## 2024-12-08 RX ORDER — PROPRANOLOL HYDROCHLORIDE 10 MG/1
10 TABLET ORAL 2 TIMES DAILY
Qty: 60 TABLET | Refills: 0 | Status: SHIPPED | OUTPATIENT
Start: 2024-12-08

## 2024-12-08 RX ADMIN — VENLAFAXINE HYDROCHLORIDE 75 MG: 75 CAPSULE, EXTENDED RELEASE ORAL at 09:27

## 2024-12-08 RX ADMIN — LOSARTAN POTASSIUM 25 MG: 25 TABLET, FILM COATED ORAL at 09:27

## 2024-12-08 RX ADMIN — PROPRANOLOL HYDROCHLORIDE 10 MG: 10 TABLET ORAL at 09:27

## 2024-12-08 RX ADMIN — NICOTINE POLACRILEX 4 MG: 2 LOZENGE ORAL at 09:49

## 2024-12-08 RX ADMIN — METHYLPHENIDATE HYDROCHLORIDE 36 MG: 18 TABLET, EXTENDED RELEASE ORAL at 09:45

## 2024-12-08 RX ADMIN — IBUPROFEN 600 MG: 600 TABLET ORAL at 16:08

## 2024-12-08 RX ADMIN — TRAZODONE HYDROCHLORIDE 50 MG: 50 TABLET ORAL at 03:48

## 2024-12-08 RX ADMIN — NICOTINE POLACRILEX 4 MG: 2 LOZENGE ORAL at 01:41

## 2024-12-08 ASSESSMENT — ACTIVITIES OF DAILY LIVING (ADL)
ADLS_ACUITY_SCORE: 47

## 2024-12-08 ASSESSMENT — COLUMBIA-SUICIDE SEVERITY RATING SCALE - C-SSRS
2. HAVE YOU ACTUALLY HAD ANY THOUGHTS OF KILLING YOURSELF?: NO
1. SINCE LAST CONTACT, HAVE YOU WISHED YOU WERE DEAD OR WISHED YOU COULD GO TO SLEEP AND NOT WAKE UP?: NO
6. HAVE YOU EVER DONE ANYTHING, STARTED TO DO ANYTHING, OR PREPARED TO DO ANYTHING TO END YOUR LIFE?: NO
TOTAL  NUMBER OF ABORTED OR SELF INTERRUPTED ATTEMPTS SINCE LAST CONTACT: NO
SUICIDE, SINCE LAST CONTACT: NO
TOTAL  NUMBER OF INTERRUPTED ATTEMPTS SINCE LAST CONTACT: NO
ATTEMPT SINCE LAST CONTACT: NO
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
## 2024-12-08 NOTE — ED PROVIDER NOTES
Patient signed out to me at shift change by Dr. Cummings pending DEC  and social work evaluation.  Originally presenting for migraine treatment, but subsequently raised concern regarding mental health in addition to current living situation.    1838 - updated by DEC  who evaluated the patient.  Patient currently in poor living condition due to being a 42-year-old living in a nursing home surrounded by older adults.  Patient reported to DEC  that as a result of this, it has exacerbated their depression and now endorsing some passive suicidal ideation.  Per DEC , patient does not have a history of abuse of this system and [facility]/disposition planning.    Disposition: Sent to [facility]     Rene Gregorio,   12/07/24 1839       Rene Gregorio,   12/07/24 1835

## 2024-12-08 NOTE — ED NOTES
Pt slept in the morning until about 0900 and was woken up for her medications. Client is alert and oriented x 3, calm and cooperative with cares, affect in fair. Client has since been up and about, ate breakfast and took all prescribed medications except she wanted Abilify 5 mg be held. She however requested for suboxone 8-2 mg films BID
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
## 2024-12-08 NOTE — PLAN OF CARE
Goal Outcome Evaluation:      Plan of Care Reviewed With: patient    PETR Huynh  [facility]  Inpatient Care Management

## 2024-12-08 NOTE — ED NOTES
Patient has been discharged back to their supported living group home with a safety plan in place. Discharge instructions were reviewed with the patient, including follow-up care plan. All medications scripts were sent to the patient's outpatient [facility] pharmacy. Reviewed safety plan and outpatient resources. Patient denies suicidal ideation and homicidal ideation. All belongings that were brought into the hospital have been returned to the patient. Escorted off the unit at 1640 accompanied by [facility] staff. Transportation was provided via taxi cab.

## 2024-12-08 NOTE — PROGRESS NOTES
Pt was brought over to [facility] from ed23. Pt initially came in for HA and later disclosed that the had been feeling more depressed and anxious. Pt reports passive SI with no plan. Pt also reports some issues with the neighbor. Pt denies any AV/H. Pt denies any drug or etoh use.   Nursing and risk assessments completed. Assessments reviewed with LMHP and physician. Admission information reviewed with patient. Patient given a tour of [facility] and instructions on using the facility. Questions regarding [facility] addressed. Pt safety search completed.

## 2024-12-08 NOTE — PLAN OF CARE
Lazaro Fraser E Deziel  December 7, 2024  Plan of Care Hand-off Note     Patient Care Path: observation    Plan for Care:   Patient is a 44-year-old binary individual using they/them pronouns with a history notable for schizoaffective disorder, poly substance use, ADHD, OCD, BPD, MDD and body dysmorphia. They originally presented to the emergency room due to concerns about migraines. After this issue was resolved, they reported concern about their current living situation, not feeling safe and also increased depression and SI as a result of this situation. They reported that if they had a gun they would probably have killed themselves. They do not have a gun. Patient endorsed jumping off a bridge as other plan they considered. Pt reported that they have been sober for one year. Their commitment (no MONTENEGRO) is set to end on Monday 12/9/24 and the guardianship of her mother will end on 1/2-25. After this they explained that they would prefer to be unhoused as they feel better cared for in the unhoused community. They also reported feeling that their mental health is better when unhoused. They denied current AVH as well as HI and SIB. Chart review indicates that pt has had multiple psychotic breaks since 2011, her last hospitalization occurring in September of 2023.            A lower level of care has been unsuccessful in treating and stabilizing patient s mental health symptoms. Patient will remain on [facility] unit under observation for continued monitoring, treatment and therapeutic intervention of mental health symptoms. Observation at [facility] could help mitigate the need for a more restrictive level of care in an inpatient setting.    Identified Goals and Safety Issues: Decrease depression    Overview:  Mom Guardian Zita Castro [phone] Customized Living Director at [facility] ([phone]) DEEDEE on file    Pt may be interested in discharging to a crisis facility rather than returning to the  facility.          Legal Status: Legal Status at Admission: Commitment, Guardian/ad litum    Psychiatry Consult: [facility]        Updated DNP and RN  regarding plan of care.           BENNY Nicole

## 2024-12-08 NOTE — PROGRESS NOTES
Triage & Transition Services, Extended Care     Client Name: Bria Breen    Date: December 8, 2024    Patient was seen    Mode of Assessment:      Service Type: (P) refused to attend group session  Session Start Time:  (P) 0915    Session End Time: (P) 0935  Session Length: (P) 20  Site Location: [facility] EMERGENCY DEPT                             [id number]  Total Number ofAttendees: (P) 2  Topic:   (P) coping skills/lifestyle management   Response:       Katya Steen [facility]   Licensed Mental Health Professional (LMHP), Extended Care  [phone]

## 2024-12-08 NOTE — CONSULTS
Diagnostic Evaluation Consultation  Crisis Assessment    Patient Name: Bria Breen  Age:  44 year old  Legal Sex: female  Gender Identity: other  Pronouns: he/him/his  Race: White  Ethnicity: Not  or   Language: English      Patient was assessed: In person   Crisis Assessment Start Date: 12/07/24  Crisis Assessment Start Time: 1803  Crisis Assessment Stop Time: 1833  Patient location: [facility] EMERGENCY DEPT                             [id number]    Referral Data and Chief Complaint  Bria Breen presents to the ED by  self. Patient is presenting to the ED for the following concerns: Worsening psychosocial stress, Depression, Suicidal ideation.   Factors that make the mental health crisis life threatening or complex are:  Patient is a 44-year-old binary individual using they/them pronouns with a history notable for schizoaffective disorder, ADHD, OCD, BPD, MDD and body dysmorphia. They originally presented to the emergency room due to concerns about migraines. After this issue was resolved, they reported concern about their current living situation, not feeling safe and also increased depression and SI as a result of this situation..      Informed Consent and Assessment Methods  Explained the crisis assessment process, including applicable information disclosures and limits to confidentiality, assessed understanding of the process, and obtained consent to proceed with the assessment.  Assessment methods included conducting a formal interview with patient, review of medical records, collaboration with medical staff, and obtaining relevant collateral information from family and community providers when available.  : done     Patient response to interventions: acceptance expressed  Coping skills were attempted to reduce the crisis:  None noted.     History of the Crisis   Patient shared that they are living in a nursing home and their roommate is an 80 year old woman with mental health  issues. Patient explained that they are allergic to marijuana and their neighbors are smoking marijuana frequently. They do not feel that their concerns are being addressed and feels that they are harassed by other tenants and neglected by staff. Patient share that the situation has slowly been building over the last two months and has culminated at this point. They reported that if they had a gun they would probably have killed themselves. They do not have a gun. Patient endorsed jumping off a bridge as other plan they considered. Pt reported that they take Effexor, Abilify and suboxone and are med compliant. Pt reported that they have been sober for one year. Their commitment (no MONTENEGRO) is set to end on Monday 12/9/24 and the guardianship of her mother will end on 1/2-25. After this they explained that they would prefer to be unhoused as they feel better cared for in the unhoused community. They also reported feeling that their mental health is better when unhoused. They denied current AVH as well as HI and SIB. Chart review indicates that pt has had multiple psychotic breaks since 2011, her last hospitalization occurring in September of 2023.  The director at the [facility] where pt lives reported that pt is medication compliant only 50% of the time and that there are frequent verbal aggression towards staff when pt is promoted to take their medications.    Brief Psychosocial History  Family:  , Children no  Support System:  Facility resident(s)/Staff, Parent(s)  Employment Status:  disabled (Pt has been on stability for three months.)  Source of Income:  disability  Financial Environmental Concerns:  other (see comments) (Pt's SSI goes towards housing leaving pt with no funds. Pt is very frustrated by this.)  Current Hobbies:  reading  Barriers in Personal Life:  mental health concerns    Significant Clinical History  Current Anxiety Symptoms:  anxious  Current Depression/Trauma:   irritable  Current Somatic Symptoms:     Current Psychosis/Thought Disturbance:     Current Eating Symptoms:     Chemical Use History:  Alcohol: None  Benzodiazepines: None  Opiates: None  Cocaine: None  Marijuana: None  Other Use: None   Past diagnosis:  Schizophrenia, Personality Disorder, ADHD, Depression, Substance Use Disorder  Family history:  ADHD, Depression  Past treatment:  Individual therapy, Case management, Civil Commitment, Primary Care, Psychiatric Medication Management, Residential Treatment, Inpatient Hospitalization, Supportive Living Environment (group home, senior living house, etc), ACT  Details of most recent treatment:  Pt has a psychiatrist through [facility] (didn't remember the name) and a  (didn't remember the name) through [facility]  Other relevant history:  Chart review indicates commitment 12 years ago following the suicide attempt by vehicle. Pt is currently under commitment and guardianship.       Collateral Information  Is there collateral information: Yes     Collateral information name, relationship, phone number:  Kia Castro, mother (legal guardian),[phone]    What happened today: Mom didn't know the specific of what led to the ED visit today.     What is different about patient's functioning: The current residence is not a good fit for her. She can't address her mental health there due to current discord and she is getting pushed into crisis. In the past when this occurs she leaves for another city or country. Due to legal concerns she can't do that. She has been through mental MH and [facility] treatment facilities in the last year and has worked hard in these programs. She has been sober for a year. She has never been this unhappy in a facility before. I worry that she will be back on the streets using drugs again.     Concern about alcohol/drug use:  No     What do you think the patient needs:  A different facility.     Has patient made comments  about wanting to kill themselves/others: no    If d/c is recommended, can they take part in safety/aftercare planning:  yes    Additional collateral information:      The facility director at [facility], Cara ([phone]), reported that pt moved in 6 weeks ago. She explained that it has been very up and down. There has been some conflict with other roommates. They have been very challenging with medication compliance, only taking meds 50% of the time. They often become verbally aggressive towards staff when prompted to take medications, with the exception of suboxone which they voluntarily request. Cara reported that they want to word with pt towards a better way of pt taking their meds and to look at alternate units that may be a better fit for pt. Cara reported that pt has their own room but shared a bathroom with another resident.     Risk Assessment  Davison Suicide Severity Rating Scale Full Clinical Version:  Suicidal Ideation  Q6 Suicide Behavior (Lifetime): no          Davison Suicide Severity Rating Scale Recent:   Suicidal Ideation (Recent)  Q1 Wished to be Dead (Past Month): yes  Q2 Suicidal Thoughts (Past Month): yes  Q3 Suicidal Thought Method: yes  Q4 Suicidal Intent without Specific Plan: yes  Q5 Suicide Intent with Specific Plan: no  Level of Risk per Screen: high risk  Intensity of Ideation (Recent)  Most Severe Ideation Rating (Past 1 Month): 3  Frequency (Past 1 Month): 2-5 times in week  Duration (Past 1 Month): Less than 1 hour/some of the time  Deterrents (Past 1 Month): Deterrents probably stopped you  Reasons for Ideation (Past 1 Month): Mostly to end or stop the pain (You couldn't go on living with the pain or how you were feeling)  Suicidal Behavior (Recent)  Actual Attempt (Past 3 Months): No  Has subject engaged in non-suicidal self-injurious behavior? (Past 3 Months): No  Interrupted Attempts (Past 3 Months): No  Aborted or Self-Interrupted  Attempt (Past 3 Months): No  Preparatory Acts or Behavior (Past 3 Months): No    Environmental or Psychosocial Events: legal issues such as DWI, DUI, lawsuit, CPS involvement, etc., neither working nor attending school, other life stressors, challenging interpersonal relationships, bullied/abused  Protective Factors: Protective Factors: reality testing ability, help seeking, able to access care without barriers, lives in a responsibly safe and stable environment    Does the patient have thoughts of harming others? Feels Like Hurting Others: no  Previous Attempt to Hurt Others: no  Is the patient engaging in sexually inappropriate behavior?: no    Is the patient engaging in sexually inappropriate behavior?  no        Mental Status Exam   Affect: Appropriate  Appearance: Disheveled  Attention Span/Concentration: Attentive  Eye Contact: Engaged    Fund of Knowledge: Appropriate   Language /Speech Content: Fluent  Language /Speech Volume: Normal  Language /Speech Rate/Productions: Articulate  Recent Memory: Intact  Remote Memory: Intact  Mood: Irritable  Orientation to Person: Yes   Orientation to Place: Yes  Orientation to Time of Day: Yes  Orientation to Date: Yes     Situation (Do they understand why they are here?): Yes  Psychomotor Behavior: Normal  Thought Content: Clear  Thought Form: Intact     Mini-Cog Assessment  Number of Words Recalled:    Clock-Drawing Test:     Three Item Recall:    Mini-Cog Total Score:       Medication  Psychotropic medications:   Medication Orders - Psychiatric (From admission, onward)      Start     Dose/Rate Route Frequency Ordered Stop    12/08/24 0800  methylphenidate HCl ER (OSM) (CONCERTA) CR tablet 36 mg         36 mg Oral EVERY MORNING 12/07/24 2110 12/08/24 0800  venlafaxine (EFFEXOR XR) 24 hr capsule 75 mg         75 mg Oral DAILY 12/07/24 2110 12/07/24 2115  ARIPiprazole (ABILIFY) tablet 5 mg         5 mg Oral DAILY 12/07/24 2110               Current Care  Team  Patient Care Team:  Radha Zeng PA-C as PCP - General    Diagnosis  Patient Active Problem List   Diagnosis Code    CARDIOVASCULAR SCREENING; LDL GOAL LESS THAN 160 Z13.6    Cholecystitis K81.9    Suicidal ideation R45.851    Auditory hallucinations R44.0    Depression with anxiety F41.8    Schizoaffective disorder, depressive type (H) F25.1    ADHD (attention deficit hyperactivity disorder), 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    Polysubstance use disorder F19.90    Major depression F32.9       Primary Problem This Admission  Active Hospital Problems    Major depression        Clinical Summary and Substantiation of Recommendations   Patient is a 44-year-old binary individual using they/them pronouns with a history notable for schizoaffective disorder, poly substance use, ADHD, OCD, BPD, MDD and body dysmorphia. They originally presented to the emergency room due to concerns about migraines. After this issue was resolved, they reported concern about their current living situation, not feeling safe and also increased depression and SI as a result of this situation. They reported that if they had a gun they would probably have killed themselves. They do not have a gun. Patient endorsed jumping off a bridge as other plan they considered. Pt reported that they have been sober for one year. Their commitment (no MONTENEGRO) is set to end on Monday 12/9/24 and the guardianship of her mother will end on 1/2-25. After this they explained that they would prefer to be unhoused as they feel better cared for in the unhoused community. They also reported feeling that their mental health is better when unhoused. They denied current AVH as well as HI and SIB. Chart review indicates that pt has had multiple psychotic breaks since 2011, her last hospitalization occurring in September of 2023.            A lower level of care has been unsuccessful in treating and stabilizing patient s mental health symptoms. [facility] for continued monitoring, treatment and therapeutic intervention of mental health symptoms. Observation at [facility] could help mitigate the need for a more restrictive level of care in an inpatient setting.                          Patient coping skills attempted to reduce the crisis:  None noted.    Disposition  Recommended disposition: Observation        Reviewed case and recommendations with attending provider. Attending Name: Amanda Quiroz DNP       Attending concurs with disposition: yes       Patient and/or validated legal guardian concurs with disposition:   yes       Final disposition:  observation    Legal status on admission: Commitment, Guardian/ad litum    Assessment Details   Total duration spent with the patient: 30 min     CPT code(s) utilized: 35350 - Psychotherapy for Crisis - 60 (30-74*) min    BENNY Nicole, Psychotherapist  DEC - Triage & Transition Services  Callback: [phone]
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
## 2024-12-09 ENCOUNTER — TELEPHONE (OUTPATIENT)
Dept: BEHAVIORAL HEALTH | Facility: CLINIC | Age: 44
End: 2024-12-09
Payer: COMMERCIAL

## 2024-12-30 ENCOUNTER — OFFICE VISIT (OUTPATIENT)
Dept: BEHAVIORAL HEALTH | Facility: CLINIC | Age: 44
End: 2024-12-30
Payer: COMMERCIAL

## 2024-12-30 VITALS — DIASTOLIC BLOOD PRESSURE: 96 MMHG | HEART RATE: 97 BPM | OXYGEN SATURATION: 98 % | SYSTOLIC BLOOD PRESSURE: 148 MMHG

## 2024-12-30 DIAGNOSIS — F11.91 OPIOID USE DISORDER IN REMISSION: Primary | ICD-10-CM

## 2024-12-30 DIAGNOSIS — Z79.891 ENCOUNTER FOR MONITORING OPIOID MAINTENANCE THERAPY: ICD-10-CM

## 2024-12-30 DIAGNOSIS — Z51.81 ENCOUNTER FOR MONITORING OPIOID MAINTENANCE THERAPY: ICD-10-CM

## 2024-12-30 PROBLEM — F15.91 HISTORY OF METHAMPHETAMINE USE: Status: ACTIVE | Noted: 2024-07-09

## 2024-12-30 PROBLEM — N30.00 ACUTE CYSTITIS: Status: RESOLVED | Noted: 2022-10-10 | Resolved: 2024-12-30

## 2024-12-30 PROBLEM — Z87.898 HISTORY OF INTRAVENOUS DRUG USE: Status: RESOLVED | Noted: 2024-07-09 | Resolved: 2024-12-30

## 2024-12-30 PROBLEM — I10 HYPERTENSION: Status: ACTIVE | Noted: 2024-10-15

## 2024-12-30 PROBLEM — D50.9 MICROCYTIC ANEMIA: Status: ACTIVE | Noted: 2022-10-10

## 2024-12-30 PROBLEM — E87.6 HYPOKALEMIA: Status: RESOLVED | Noted: 2022-10-10 | Resolved: 2024-12-30

## 2024-12-30 PROBLEM — Z82.49 FAMILY HISTORY OF HYPERTENSION: Status: ACTIVE | Noted: 2024-11-25

## 2024-12-30 PROBLEM — R03.0 ELEVATED BLOOD PRESSURE READING: Status: ACTIVE | Noted: 2022-10-10

## 2024-12-30 PROBLEM — Z87.898 HISTORY OF INTRAVENOUS DRUG USE: Status: ACTIVE | Noted: 2024-07-09

## 2024-12-30 PROBLEM — E78.5 HYPERLIPIDEMIA: Status: ACTIVE | Noted: 2021-07-01

## 2024-12-30 PROBLEM — F14.10 COCAINE ABUSE, UNCOMPLICATED (H): Status: ACTIVE | Noted: 2023-06-01

## 2024-12-30 PROBLEM — K02.9 DENTAL CARIES: Status: ACTIVE | Noted: 2024-11-25

## 2024-12-30 LAB
AMPHETAMINE QUAL URINE POCT: NEGATIVE
BARBITURATE QUAL URINE POCT: NEGATIVE
BENZODIAZEPINE QUAL URINE POCT: NEGATIVE
BUPRENORPHINE QUAL URINE POCT: ABNORMAL
COCAINE QUAL URINE POCT: NEGATIVE
CREAT UR-MCNC: 40 MG/DL
CREATININE QUAL URINE POCT: ABNORMAL
INTERNAL QC QUAL URINE POCT: ABNORMAL
MDMA QUAL URINE POCT: NEGATIVE
METHADONE QUAL URINE POCT: NEGATIVE
METHAMPHETAMINE QUAL URINE POCT: NEGATIVE
OPIATE QUAL URINE POCT: NEGATIVE
OXYCODONE QUAL URINE POCT: NEGATIVE
PH QUAL URINE POCT: ABNORMAL
PHENCYCLIDINE QUAL URINE POCT: NEGATIVE
POCT KIT EXPIRATION DATE: ABNORMAL
POCT KIT LOT NUMBER: ABNORMAL
SPECIFIC GRAVITY POCT: 1.01
TEMPERATURE URINE POCT: ABNORMAL
THC QUAL URINE POCT: NEGATIVE

## 2024-12-30 PROCEDURE — H0038 SELF-HELP/PEER SVC PER 15MIN: HCPCS

## 2024-12-30 RX ORDER — HYDROXYZINE PAMOATE 50 MG/1
50 CAPSULE ORAL 3 TIMES DAILY PRN
COMMUNITY
Start: 2024-07-01

## 2024-12-30 RX ORDER — ALPRAZOLAM 0.25 MG/1
0.25 TABLET ORAL
COMMUNITY
Start: 2024-12-11

## 2024-12-30 RX ORDER — ONDANSETRON 4 MG/1
4 TABLET, ORALLY DISINTEGRATING ORAL EVERY 6 HOURS PRN
COMMUNITY

## 2024-12-30 RX ORDER — FERROUS SULFATE 325(65) MG
325 TABLET ORAL EVERY OTHER DAY
COMMUNITY
Start: 2024-06-24

## 2024-12-30 RX ORDER — HYDROCORTISONE 25 MG/G
1 OINTMENT TOPICAL
COMMUNITY
Start: 2024-08-07

## 2024-12-30 RX ORDER — EPINEPHRINE 0.3 MG/.3ML
0.3 INJECTION SUBCUTANEOUS PRN
COMMUNITY
Start: 2024-07-01

## 2024-12-30 RX ORDER — BUPRENORPHINE AND NALOXONE 8; 2 MG/1; MG/1
1 FILM, SOLUBLE BUCCAL; SUBLINGUAL 2 TIMES DAILY
Qty: 30 EACH | Refills: 0 | Status: SHIPPED | OUTPATIENT
Start: 2024-12-30 | End: 2024-12-31

## 2024-12-30 RX ORDER — MEDROXYPROGESTERONE ACETATE 150 MG/ML
150 INJECTION, SUSPENSION INTRAMUSCULAR
COMMUNITY
Start: 2024-10-16

## 2024-12-30 RX ORDER — BUPRENORPHINE AND NALOXONE 8; 2 MG/1; MG/1
1 FILM, SOLUBLE BUCCAL; SUBLINGUAL 2 TIMES DAILY
Qty: 30 EACH | Refills: 0 | Status: SHIPPED | OUTPATIENT
Start: 2024-12-30 | End: 2024-12-30

## 2024-12-30 ASSESSMENT — PATIENT HEALTH QUESTIONNAIRE - PHQ9: SUM OF ALL RESPONSES TO PHQ QUESTIONS 1-9: 3
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
## 2024-12-31 ENCOUNTER — TELEPHONE (OUTPATIENT)
Dept: BEHAVIORAL HEALTH | Facility: CLINIC | Age: 44
End: 2024-12-31
Payer: COMMERCIAL

## 2024-12-31 DIAGNOSIS — F11.91 OPIOID USE DISORDER IN REMISSION: ICD-10-CM

## 2024-12-31 RX ORDER — BUPRENORPHINE AND NALOXONE 8; 2 MG/1; MG/1
1 FILM, SOLUBLE BUCCAL; SUBLINGUAL 2 TIMES DAILY
Qty: 30 EACH | Refills: 0 | Status: SHIPPED | OUTPATIENT
Start: 2024-12-31

## 2024-12-31 NOTE — TELEPHONE ENCOUNTER
Per chart, patient was seen yesterday in the Recovery Clinic and Suboxone rx was sent to [facility]. Attempted to call patient to clarify patient wants rx resent to a different pharmacy. No answer; LVM to call clinic[facility]    Zulema Stevenson RN on 12/31/2024 at 9:37 AM

## 2024-12-31 NOTE — TELEPHONE ENCOUNTER
Writer attempted to contact patient, no answer; left VM message asking for a return call to the Recovery Clinic.     Mya Bhardwaj RN on 12/31/2024 at 3:50 PM

## 2024-12-31 NOTE — PROGRESS NOTES
Called and spoke with mom/guardian.  Updated her that Cam had been in clinic yesterday, we are continuing Suboxone.  Also reviewed option of monthly Sublocade and let her know I will discuss this at future visit - provides additional stability.    Rhina Galo, DO on 12/31/2024 at 4:46 PM

## 2024-12-31 NOTE — TELEPHONE ENCOUNTER
Attempted to call patient. No answer; LVM to call clinic. Also left message with group home nurse, [facility], [phone]. DEEDEE on file.    Zulema Stevenson RN on 12/31/2024 at 1:03 PM

## 2024-12-31 NOTE — TELEPHONE ENCOUNTER
Attempted to call patient, no answer.  Given we are closed tomorrow, I called [facility] and they said patient usually fills meds at [facility].  Script sent to [facility] with request to deliver ASAP.      Rhina Galo DO on 12/31/2024 at 4:34 PM

## 2024-12-31 NOTE — TELEPHONE ENCOUNTER
2nd attempt to contact nursing office at [facility] re: patient's Suboxone rx. Per Dr. Galo, she confirmed with [facility], [address], [place], yesterday at patient's visit that they had Suboxone in stock. Per  staff, patient left message that pharmacy need to deliver rx (?) Requested [facility] nurse call the [facility] to sort out preferred pharmacy.    Zulema Stevenson RN on 12/31/2024 at 3:15 PM]

## 2024-12-31 NOTE — PROGRESS NOTES
[facility]    Peer  met with Bria Breen in the Recovery Clinic to introduce himself, detail services provided and discuss current status of recovery. Pt appeared alert, oriented and open to feedback during our discussion.     Pt arrives for visit with provider for suboxone.  PRC sees patient today under provider diagnosis of opioid substance disorder, severe, dependence  (H)       Subjective (S)-  Pt states they have been lean since November 2023, ran out of prescription of suboxone and is scared of withdrawls.    Objective (O)-  Pt presented as anxious    Assessment (A)- N/A    Plan (P)-   PRC Beti shared lived experience and discussed a recovery plan    PRC provided business card to pt welcoming contact for recovery based support and resources. PRC and pt agree to speak again during an upcoming  visit.           Service Type:     Individual     Session Start Time: 4:25 pm                      Session End Time: 4:54 pm       Session Length:  29 minutes           Patient Goal:   To utilize suboxone assisted treatment for sobriety and long term recovery.     Goal Progress:   Ongoing.    Key Risk Factors to Recovery:   PRC encourages being aware of risk factors that can lead to re-use which include avoiding isolation, avoiding triggers and managing cravings in a healthy manner. being open and willing to acceptance and change on a daily basis.  PRC encourages pt to establish a sober network calling tree to reach out to when needed.  Continue to practice honesty with ourselves and trusted support person(s).   PRC encourages regular attendance at recovery based meetings as well as finding a sponsor for mentoring and accountability.   PRC encourages consideration of other services such as counseling for mental health issues which can correlate with our substance use.      Support Needs:   Ongoing care, support and resources for opioid substance use disorder as well as other  substances.     Follow up:   PRC has provided pt with his contact information for support and resource needs.    PRC and pt agree to meet during an upcoming  visit.       [facility]  [address], [address]   [place], [place], [address]  Clinic Phone: [phone]  Clinic Fax: [phone]  Peer  phone:  [phone]    Open Monday - Friday  9:00am-4:00pm  Walk in hours: 9am-3pm      Joseph Bang  December 31, 2024  3:34 PM    YANN Stallings provides clinical oversite and supervision of care.

## 2025-01-02 LAB
NORBUPRENORPHINE UR CFM-MCNC: 70 NG/ML
NORBUPRENORPHINE/CREAT UR: 175 NG/MG {CREAT}

## 2025-01-02 NOTE — TELEPHONE ENCOUNTER
Thank you for follow-up!  Please cancel script with Treva, we will be seeing him back in 2 weeks for follow-up.    Rhina Galo, DO on 1/2/2025 at 12:47 PM

## 2025-01-02 NOTE — TELEPHONE ENCOUNTER
RN called [facility] and spoke with pharmacist Abdias and they cancelled script.     Lorrie Pina RN on 1/2/2025 at 1:08 PM

## 2025-01-02 NOTE — TELEPHONE ENCOUNTER
Writer spoke w/[facility] pharmacy who reports the prescription for Suboxone is too soon to fill until 01/12/2025 as it was already filled by [facility]'s pharmacy. Writer spoke with [facility] pharmacy who reports patient (or someone) picked up the prescription on 12/31/2024 @3:27 pm. Writer spoke with [facility] pharmacy and requested they profile the prescription for the time being.  again spoke with Vamshi and notified her that the prescription was picked up, Vamshi reported she will follow-up with patient.     Mya Bhardwaj RN on 1/2/2025 at 11:13 AM

## 2025-02-03 ENCOUNTER — DOCUMENTATION ONLY (OUTPATIENT)
Dept: OTHER | Facility: CLINIC | Age: 45
End: 2025-02-03
Payer: COMMERCIAL

## 2025-02-03 ENCOUNTER — HOSPITAL ENCOUNTER (OUTPATIENT)
Facility: CLINIC | Age: 45
Setting detail: OBSERVATION
Discharge: HOME | End: 2025-02-04
Attending: EMERGENCY MEDICINE | Admitting: PSYCHIATRY & NEUROLOGY
Payer: COMMERCIAL

## 2025-02-03 DIAGNOSIS — F90.0 ADHD (ATTENTION DEFICIT HYPERACTIVITY DISORDER), INATTENTIVE TYPE: ICD-10-CM

## 2025-02-03 DIAGNOSIS — R45.851 SUICIDAL IDEATION: ICD-10-CM

## 2025-02-03 DIAGNOSIS — F15.20 METHAMPHETAMINE USE DISORDER, MODERATE (H): ICD-10-CM

## 2025-02-03 DIAGNOSIS — F11.90 OPIOID USE DISORDER: ICD-10-CM

## 2025-02-03 DIAGNOSIS — F15.959 STIMULANT-INDUCED PSYCHOTIC DISORDER (H): ICD-10-CM

## 2025-02-03 PROBLEM — F25.1 SCHIZOAFFECTIVE DISORDER, DEPRESSIVE TYPE (H): Status: ACTIVE | Noted: 2023-10-05

## 2025-02-03 PROBLEM — F15.10 METHAMPHETAMINE USE (H): Status: ACTIVE | Noted: 2025-02-03

## 2025-02-03 PROBLEM — F32.A DEPRESSION, UNSPECIFIED DEPRESSION TYPE: Status: ACTIVE | Noted: 2024-12-07

## 2025-02-03 PROBLEM — F15.90 STIMULANT USE DISORDER: Status: ACTIVE | Noted: 2025-02-03

## 2025-02-03 PROBLEM — F20.9 SCHIZOPHRENIA (H): Status: ACTIVE | Noted: 2025-02-03

## 2025-02-03 PROBLEM — F19.90 POLYSUBSTANCE USE DISORDER: Status: ACTIVE | Noted: 2023-10-05

## 2025-02-03 PROCEDURE — 99222 1ST HOSP IP/OBS MODERATE 55: CPT

## 2025-02-03 PROCEDURE — 250N000013 HC RX MED GY IP 250 OP 250 PS 637

## 2025-02-03 PROCEDURE — 250N000013 HC RX MED GY IP 250 OP 250 PS 637: Performed by: EMERGENCY MEDICINE

## 2025-02-03 PROCEDURE — 99285 EMERGENCY DEPT VISIT HI MDM: CPT

## 2025-02-03 PROCEDURE — G0378 HOSPITAL OBSERVATION PER HR: HCPCS

## 2025-02-03 RX ORDER — GABAPENTIN 300 MG/1
300 CAPSULE ORAL 3 TIMES DAILY PRN
Status: DISCONTINUED | OUTPATIENT
Start: 2025-02-03 | End: 2025-02-04 | Stop reason: HOSPADM

## 2025-02-03 RX ORDER — DIAZEPAM 2 MG/1
2 TABLET ORAL ONCE
Status: COMPLETED | OUTPATIENT
Start: 2025-02-03 | End: 2025-02-03

## 2025-02-03 RX ORDER — VENLAFAXINE HYDROCHLORIDE 37.5 MG/1
37.5 CAPSULE, EXTENDED RELEASE ORAL
Status: DISCONTINUED | OUTPATIENT
Start: 2025-02-04 | End: 2025-02-04 | Stop reason: HOSPADM

## 2025-02-03 RX ORDER — OLANZAPINE 10 MG/1
10 TABLET, ORALLY DISINTEGRATING ORAL 2 TIMES DAILY PRN
Status: DISCONTINUED | OUTPATIENT
Start: 2025-02-03 | End: 2025-02-04 | Stop reason: HOSPADM

## 2025-02-03 RX ORDER — ACETAMINOPHEN 325 MG/1
650 TABLET ORAL EVERY 4 HOURS PRN
Status: DISCONTINUED | OUTPATIENT
Start: 2025-02-03 | End: 2025-02-04 | Stop reason: HOSPADM

## 2025-02-03 RX ORDER — IBUPROFEN 600 MG/1
600 TABLET, FILM COATED ORAL EVERY 6 HOURS PRN
Status: DISCONTINUED | OUTPATIENT
Start: 2025-02-03 | End: 2025-02-04 | Stop reason: HOSPADM

## 2025-02-03 RX ORDER — ACETAMINOPHEN 325 MG/1
650 TABLET ORAL EVERY 4 HOURS PRN
Status: DISCONTINUED | OUTPATIENT
Start: 2025-02-03 | End: 2025-02-03

## 2025-02-03 RX ORDER — CLONIDINE HYDROCHLORIDE 0.1 MG/1
0.1 TABLET ORAL 3 TIMES DAILY PRN
Status: DISCONTINUED | OUTPATIENT
Start: 2025-02-03 | End: 2025-02-04 | Stop reason: HOSPADM

## 2025-02-03 RX ORDER — TRAZODONE HYDROCHLORIDE 50 MG/1
50 TABLET ORAL
Status: DISCONTINUED | OUTPATIENT
Start: 2025-02-03 | End: 2025-02-04 | Stop reason: HOSPADM

## 2025-02-03 RX ORDER — NICOTINE 21 MG/24HR
1 PATCH, TRANSDERMAL 24 HOURS TRANSDERMAL DAILY
Status: DISCONTINUED | OUTPATIENT
Start: 2025-02-03 | End: 2025-02-04 | Stop reason: HOSPADM

## 2025-02-03 RX ADMIN — DIAZEPAM 2 MG: 2 TABLET ORAL at 19:46

## 2025-02-03 RX ADMIN — ACETAMINOPHEN 650 MG: 325 TABLET, FILM COATED ORAL at 14:57

## 2025-02-03 RX ADMIN — OLANZAPINE 10 MG: 10 TABLET, ORALLY DISINTEGRATING ORAL at 23:08

## 2025-02-03 RX ADMIN — TRAZODONE HYDROCHLORIDE 50 MG: 50 TABLET ORAL at 23:08

## 2025-02-03 RX ADMIN — NICOTINE 1 PATCH: 21 PATCH, EXTENDED RELEASE TRANSDERMAL at 13:59

## 2025-02-03 ASSESSMENT — COLUMBIA-SUICIDE SEVERITY RATING SCALE - C-SSRS
4. HAVE YOU HAD THESE THOUGHTS AND HAD SOME INTENTION OF ACTING ON THEM?: NO
ATTEMPT LIFETIME: NO
5. HAVE YOU STARTED TO WORK OUT OR WORKED OUT THE DETAILS OF HOW TO KILL YOURSELF? DO YOU INTEND TO CARRY OUT THIS PLAN?: NO
REASONS FOR IDEATION PAST MONTH: MOSTLY TO END OR STOP THE PAIN (YOU COULDN'T GO ON LIVING WITH THE PAIN OR HOW YOU WERE FEELING)
1. IN THE PAST MONTH, HAVE YOU WISHED YOU WERE DEAD OR WISHED YOU COULD GO TO SLEEP AND NOT WAKE UP?: YES
TOTAL  NUMBER OF INTERRUPTED ATTEMPTS LIFETIME: NO
2. HAVE YOU ACTUALLY HAD ANY THOUGHTS OF KILLING YOURSELF?: YES
1. HAVE YOU WISHED YOU WERE DEAD OR WISHED YOU COULD GO TO SLEEP AND NOT WAKE UP?: YES
TOTAL  NUMBER OF ABORTED OR SELF INTERRUPTED ATTEMPTS LIFETIME: NO
2. HAVE YOU ACTUALLY HAD ANY THOUGHTS OF KILLING YOURSELF?: YES
6. HAVE YOU EVER DONE ANYTHING, STARTED TO DO ANYTHING, OR PREPARED TO DO ANYTHING TO END YOUR LIFE?: NO

## 2025-02-03 ASSESSMENT — ACTIVITIES OF DAILY LIVING (ADL)
ADLS_ACUITY_SCORE: 47

## 2025-02-03 NOTE — ED NOTES
[facility]  ED to [facility] Checklist:      Goal for [facility]: Depression management and Suicidality    Current Behavior: Calm and Cooperative    Safety Concerns: Suicidal, no plan    Legal Hold Status: Voluntary    Medically Cleared by ED provider: Yes    Patient Therapeutically Searched: Not searched - Currently in triage    Belongings: Remain with patient    Independent Ambulation at Baseline: Yes/No: Yes    Participates in Care/Conversation: Yes/No: Yes    Patient Informed about [facility]: Yes/No: Yes    DEC: Not ordered    Patient Ready to be Transferred to [facility]? Yes/No: Yes

## 2025-02-03 NOTE — ED NOTES
Patient brought in from triage.  Patient came in with suicidal ideation. Denies having a plan. Patient stated he used meth today and fentanyl at 0600. He states he is living in a hotel and there are lots of drugs there. He has been wandering around since leaving a group facility where he was living just recently. Patient states they are waiting for a bed at [place]. Unsure if this is true or not. Patient also states he does not have a guardian but [facility] states that there is no documentation of that and mother states she is no longer her guardian. Patient also states he needs to be at court at 2pm tomorrow. Unable to clearly explain for what.  The patient is anxious and restless. The patient is polite but poor boundaries.Nursing and risk assessments completed.  Assessments reviewed with LMHP and physician. Video monitoring in progress, patient informed.  Admission information reviewed with patient. Patient given a tour of [facility] and instructions on using the facility. Questions regarding [facility] addressed.

## 2025-02-03 NOTE — ED NOTES
"Pt presents as tense and anxious with pressured speech. Pt is looking forward to having the CD assessment tomorrow, stating \"I really need it\". Pt wants to make it to court at 1400. BP elevated, pt states \"It's always high\". Pt met with the provider and one time dose of Valium was ordered as well as Effexor was restarted. Utox still needed. Mom is guardian. Pt will stay overnight on observation. Pt denies SI.   "

## 2025-02-03 NOTE — ED TRIAGE NOTES
Patient BIBA with c/o SI. No plan to harm themself. Patient has hx of SI. Patient reports last used meth today. Calm and cooperative. Patient wanting to go to [facility], has been before. Patient also interested in treatment center for meth and fentanyl use.     Triage Assessment (Adult)       Row Name 02/03/25 1041          Triage Assessment    Airway WDL WDL        Respiratory WDL    Respiratory WDL WDL        Skin Circulation/Temperature WDL    Skin Circulation/Temperature WDL WDL        Cardiac WDL    Cardiac WDL WDL        Peripheral/Neurovascular WDL    Peripheral Neurovascular WDL WDL        Cognitive/Neuro/Behavioral WDL    Cognitive/Neuro/Behavioral WDL WDL
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
## 2025-02-03 NOTE — PROGRESS NOTES
CD Consult acknowledged.  Staff will attempt to see pt for CD Consult Tuesday 2/4/25. If pt discharges prior to consult, they can call [facility] at [phone] to schedule an OP ESCOBAR Assessment.     [facility]ilson Robert [facility] on 2/3/2025 at 4:26 PM
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
## 2025-02-03 NOTE — ED NOTES
[facility] Note:    There is conflicting report regarding Pt's legal status. Per Pt, Pt reports he is no longer under guardianship of his mother, Zita Castro (emergency contact). Writer contacted Zita, who also reports operating under the belief that Pt is not under any guardianship.    She reports operating under the impression she is no longer the guardian of Pt. She provided Writer with contact information for Cara Vogt, manager for recent group home ([phone], [phone]) and Pt's , Damir Lopez ([phone], [phone]).     Chart review indicates Pt's mother is his guardian, yet Pt and guardian report belief Pt is no longer under guardianship as of January 2025. Writer consulted internal resources to determine status. [employer] indicated Pt is still under guardianship of Zita Castro (Pt's mother).

## 2025-02-04 VITALS
HEART RATE: 104 BPM | SYSTOLIC BLOOD PRESSURE: 181 MMHG | DIASTOLIC BLOOD PRESSURE: 98 MMHG | WEIGHT: 173.8 LBS | HEIGHT: 65 IN | BODY MASS INDEX: 28.96 KG/M2 | TEMPERATURE: 97.7 F | OXYGEN SATURATION: 99 % | RESPIRATION RATE: 20 BRPM

## 2025-02-04 PROBLEM — F15.20 METHAMPHETAMINE USE DISORDER, MODERATE (H): Status: ACTIVE | Noted: 2025-02-04

## 2025-02-04 PROBLEM — F15.959 STIMULANT-INDUCED PSYCHOTIC DISORDER (H): Status: ACTIVE | Noted: 2025-02-04

## 2025-02-04 PROCEDURE — 99239 HOSP IP/OBS DSCHRG MGMT >30: CPT | Performed by: PSYCHIATRY & NEUROLOGY

## 2025-02-04 PROCEDURE — G0378 HOSPITAL OBSERVATION PER HR: HCPCS

## 2025-02-04 RX ORDER — VENLAFAXINE HYDROCHLORIDE 75 MG/1
75 CAPSULE, EXTENDED RELEASE ORAL DAILY
Qty: 15 CAPSULE | Refills: 0 | Status: ON HOLD | OUTPATIENT
Start: 2025-02-04

## 2025-02-04 ASSESSMENT — COLUMBIA-SUICIDE SEVERITY RATING SCALE - C-SSRS
6. HAVE YOU EVER DONE ANYTHING, STARTED TO DO ANYTHING, OR PREPARED TO DO ANYTHING TO END YOUR LIFE?: NO
2. HAVE YOU ACTUALLY HAD ANY THOUGHTS OF KILLING YOURSELF?: NO
ATTEMPT SINCE LAST CONTACT: NO
TOTAL  NUMBER OF ABORTED OR SELF INTERRUPTED ATTEMPTS SINCE LAST CONTACT: NO
TOTAL  NUMBER OF INTERRUPTED ATTEMPTS SINCE LAST CONTACT: NO
SUICIDE, SINCE LAST CONTACT: NO
1. SINCE LAST CONTACT, HAVE YOU WISHED YOU WERE DEAD OR WISHED YOU COULD GO TO SLEEP AND NOT WAKE UP?: NO

## 2025-02-04 ASSESSMENT — ACTIVITIES OF DAILY LIVING (ADL)
ADLS_ACUITY_SCORE: 47
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
## 2025-02-04 NOTE — PROGRESS NOTES
"Patient was observed to wake up around 0130 in sensory room A and urinated on floor.  Patient explained they were unaware of what they were doing by responding, \"I was sleep walking.\"  Pt was directed back to chair in milieu and explained if this happens again they would go back to ED. Patient acknowledged. Sensory room was cleaned and patient remained in chair rest of shift.   "

## 2025-02-04 NOTE — ED NOTES
Discharge instructions reviewed with patient including follow-up care plan. Educated on medication regime and advised not to stop prescribed medication without consulting their physician. Reviewed safety plan and outpatient resources/appointments.Verbalized understanding of discharge instructions. Denies SI. All belongings which where brought into the hospital have been returned to patient. Escorted off the unit @ 1020 with staff.  Discharged to via cab to the [facility].

## 2025-02-04 NOTE — PROGRESS NOTES
"Pt was observed on camera with their pants down, urinating on the floor in sensory room A.  When approached by this RN, pt stepped over the puddle of urine on the floor and laid down on the mat with pants still down.  Pt stated \"I spilled something.\"  When asked about their pants were down, they stated \"I was sleepwalking.\"  Pt was asked to come out of the sensory room so staff could clean and air out the room; pt cooperative.  Pt is resting in the recliner with eyes closed at this time.  "

## 2025-02-04 NOTE — CONSULTS
"Called unit to meet with patient to discuss possible [facility] treatment options and to possibly complete an assessment. Informed by Unit staff that assessment/consult is no longer needed at this time.  \"We're going to be discharging him\".     Edil[facility] on 2/4/2025 at 9:49 AM        "

## 2025-02-04 NOTE — ED PROVIDER NOTES
"EmPATH Unit - Psychiatric Observation Discharge Summary  [facility] Emergency Department  Discharge Date: 2/4/2025    Bria Breen MRN: [id number]   Age: 44 year old YOB: 1980     Brief HPI & Initial ED Course     Chief Complaint   Patient presents with    Suicidal     HPI  Bria \"Cam\" ZANE Breen is a 44 year old adult with history notable for mood instability further complicated by substance use.  He is familiar to me from a prior visit to the EmPATH unit.  Documentation indicates that the patient presented to the emergency department reporting suicidal ideation in the context of methamphetamine use and a few days of nonadherence with medication.  His antidepressant medication was restarted and he has been monitored under observation status for nearly 24 hours now.  On reassessment today, the patient reports that he has a court hearing this afternoon and emphasized the importance of attending the court hearing.  He reports that his mood is improved.  Sleep was fair last night.  He is tolerating resuming Effexor without side effects.  His maintenance dose was reported to be 75 mg daily.  He asked that I send a prescription of this medicine to his outpatient pharmacy.  He is not seeking any other medications today.  He denied auditory and visual hallucinations and thus does not desire restarting antipsychotic or mood stabilizing medications.  He notes awareness of how to pursue a chemical health assessment in the community if desiring formal substance use disorder treatment.  He is not seeking her assistance with these resources today.  He has outpatient providers he may follow up with.  Anxiety is moderate, mostly related to coordinating a plan that allows him to get to court on time, otherwise his mood and anxiety symptoms have improved.  Suicidal thoughts have resolved with no active intent or plan reported.  He denied homicidal thoughts.  He interprets readiness to discharge back to the community " "today.        Physical Examination   BP: (!) 179/116  Pulse: 79  Temp: 98.3  F (36.8  C)  Resp: 20  Height: 165.1 cm (5' 5\")  Weight: 78.8 kg (173 lb 12.8 oz)  SpO2: 99 %    Physical Exam  General: Appears stated age.   Neuro: Alert and fully oriented. Extremities appear to demonstrate normal strength on visual inspection.   Integumentary/Skin: no rash visualized, normal color    Psychiatric Examination   Appearance: awake, alert  Attitude:  cooperative  Eye Contact:  fair  Mood:  better  Affect:  intensity is blunted  Speech:  clear, coherent  Psychomotor Behavior:  no evidence of tardive dyskinesia, dystonia, or tics  Thought Process:  logical and linear  Associations:  no loose associations  Thought Content:  no evidence of suicidal ideation or homicidal ideation and no evidence of psychotic thought  Insight:  fair  Judgement:  fair  Oriented to:  time, person, and place  Attention Span and Concentration:  fair  Recent and Remote Memory:  fair  Language: able to name/identify objects without impairment  Fund of Knowledge: intact with awareness of current and past events    Results        Labs Ordered and Resulted from Time of ED Arrival to Time of ED Departure - No data to display    Observation Course   The patient was found to have a psychiatric condition that would benefit from an observation stay in the emergency department for further psychiatric stabilization and/or coordination of a safe disposition. The plan upon observation admission included serial assessments of psychiatric condition, potential administration of medications if indicated, further disposition pending the patient's psychiatric course during the monitoring period.     Serial assessments of the patient's psychiatric condition were performed. Nursing notes were reviewed. During the observation period, the patient did not require medications for agitation, and did not require restraints/seclusion for patient and/or provider safety.     After " a period of working with the treatment team on the EmPATH unit, the patient's mental state improved to allow a safe transition to outpatient care. After counseling on the diagnosis, work-up, and treatment plan, the patient was discharged. Close follow-up with a psychiatrist and/or therapist was recommended and community psychiatric resources were provided. Patient is to return to the ED if any urgent or potentially life-threatening concerns.     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  Recommend pursuing a chemical health assessment in the community to help acquire additional sobriety promoting resources and substance use disorder treatments.  He should follow up with his outpatient provider to request a refill of Suboxone.  [place] prescription database indicates that he last filled a 30-day supply of the medicine on December 30.  -Resume outpatient medication management appointments and psychotherapy  -At this time, the patient does not meet criteria for psychiatric hospitalization or to be placed under an involuntary hold.  -Continue to coordinate his treatment and disposition plans with his guardian  -Discharge back to the community today      At the time of discharge, the patient's acute suicide risk was determined to be low due to the following factors: Reduction in the intensity of mood/anxiety symptoms that preceded the admission, denial of suicidal thoughts, denies feeling helpless or hopeless, not currently under the influence of alcohol or illicit substances, denies experiencing command hallucinations, no immediate access to  firearms. The patient's acute risk could be higher if noncompliant with their treatment plan, medications, follow-up appointments or using illicit substances or alcohol. Protective factors include: social supports    I spent more than 30 minutes on discharge day activities.    --  Gino Stuart MD  [facility] EMERGENCY DEPT  EmPATH Unit       Gino Stuart MD  02/04/25 1002

## 2025-02-04 NOTE — ED NOTES
Patient observed on camera by staff, pulling down his pants, and urinating all over floor in sensory room A. When staff approached patient about the situation, patient attempted to lay down on mat before staff entered room, when staff entered and approached patient, he told staff that something had spilt and that he was sleep walking. Sensory room cleaned by staff.

## 2025-02-04 NOTE — PROGRESS NOTES
"Triage and Transition Services Extended Care Reassessment     Patient: Cam goes by \"Cam,\" uses he/him pronouns  Date of Service: February 4, 2025  Site of Service: [facility] Emergency Dept                               Patient was seen yes  Mode of Assessment: In person     Reason for Reassessment:      History of Patient's Original Emergency Room Encounter: Pt reports historical diagnoses of ADHD, JOHANNA, and MDD. Chart review indicates historical diagnoses of ADHD, OCD, BPD, MDD, schizophrenia and body dysmorphia. Pt reports his suicidal ideation started today after using methamphetamines. Chart review indicates Pt's mother is his guardian, yet Pt and guardian report belief Pt is no longer under guardianship as of January 2025. Writer consulted internal resources to determine status.    Current Patient Presentation: Pt is calm, cooperative, and engaged with Writer during therapeutic check-in.    Presentation Summary: Writer was informed by Pt's care team (RN and attending psychiatric provider) that Pt is requesting to discharge and is cleared to do so. Writer approached Pt to engage in therapeutic check-in and safety planning. Pt was observed to be in his recliner. Writer stated purpose of interaction and Pt was agreeable to meet. Given limited space on [facility], the check-in was completed while Pt remained in the recliner. Writer identified Pt's goal of discharging in order ot present to his court hearing. Pt confirmed. Writer had printed out previous completed safety plan and identified desire of reviewing and editing the safety plan if need be. Pt was agreeable. Writer and Pt reviewed the safety plan and added a friend to the support section. When prompted by Writer, Pt denies any SI, HI, AH/VH. Writer confirmed plan of having Pt cabbed to the court house in order to present to court and Pt was in agreement. Writer asked if Pt had any other concerns or requests at this time to which Pt " declined.    Changes Observed Since Initial Assessment: decrease in presenting symptoms, patient/family request (Pt requests to discharge in order to make court appearance at 1400 in [facility].)    Therapeutic Interventions Provided: Engaged in safety planning, Engaged in social skills training.    Current Symptoms: anxious   anxious        Mental Status Exam   Affect: Appropriate  Appearance: Appropriate  Attention Span/Concentration: Attentive  Eye Contact: Engaged    Fund of Knowledge: Appropriate   Language /Speech Content: Fluent  Language /Speech Volume: Normal  Language /Speech Rate/Productions: Normal  Recent Memory: Intact  Remote Memory: Intact  Mood: Anxious, Other (please comment) (anxious about making his court appearance at 1400 through [facility].)  Orientation to Person: Yes   Orientation to Place: Yes  Orientation to Time of Day: Yes  Orientation to Date: Yes     Situation (Do they understand why they are here?): Yes  Psychomotor Behavior: Normal  Thought Content: Clear  Thought Form: Intact    Treatment Objective(s) Addressed: safety planning, assessing safety, identifying an appropriate aftercare plan    Patient Response to Interventions: acceptance expressed, verbalizes understanding    Progress Towards Goals:  Patient Reports Symptoms Are: improving  Patient Progress Toward Goals: is making progress  Comment: Pt denies any SI, HI, AH/VH.  Next Step to Work Toward Discharge: collaboration with OP team/family/friends, patient ability to engage in safety planning  Ability to Engage Comment: Had Pt engage in and complete an aftercare/safety plan.  Collaboration Comment: Communicated disposition with Pt's mother/possible guardian, Zita Castro.    Case Management: Case Management Included: collaborating with patient's support system  Details on Collaborating with Patient's Support System: Spoke with Zita Castro (mother, possible legal guardian, ([phone])  Summary of  Interaction: Writer spoke with Zita on the phone regarding recommended disposition. Writer informed Zita of Pt's desire to discharge in order to make his court appearance. Writer also identified how Pt will be cabbed to the court house for his court date. Zita states she understands and is in agreement with recommended disposition.    C-SSRS Since Last Contact:   1. Wish to be Dead (Since Last Contact): No  2. Non-Specific Active Suicidal Thoughts (Since Last Contact): No     Actual Attempt (Since Last Contact): No  Has subject engaged in non-suicidal self-injurious behavior? (Since Last Contact): No  Interrupted Attempts (Since Last Contact): No  Aborted or Self-Interrupted Attempt (Since Last Contact): No  Preparatory Acts or Behavior (Since Last Contact): No  Suicide (Since Last Contact): No     Calculated C-SSRS Risk Score (Since Last Contact): No Risk Indicated    Plan: Final Disposition / Recommended Care Path: discharge  Plan for Care reviewed with assigned Medical Provider: yes  Plan for Care Team Review: provider, RN  Comments: Dr. Stuart  Patient and/or validated legal guardian concurs: yes  Clinical Substantiation:     Writer was informed by Pt's care team (RN and attending psychiatric provider) that Pt is requesting to discharge and is cleared to do so. Writer approached Pt to engage in therapeutic check-in and safety planning. Pt was observed to be in his recliner. Writer stated purpose of interaction and Pt was agreeable to meet. Given limited space on [facility], the check-in was completed while Pt remained in the recliner. Writer identified Pt's goal of discharging in order ot present to his court hearing. Pt confirmed. Writer had printed out previous completed safety plan and identified desire of reviewing and editing the safety plan if need be. Pt was agreeable. Writer and Pt reviewed the safety plan and added a friend to the support section. When prompted by Writer, Pt denies any SI, HI, AH/VH.  Writer confirmed plan of having Pt cabbed to the court house in order to present to court and Pt was in agreement. Writer asked if Pt had any other concerns or requests at this time to which Pt declined.    Writer spoke with Zita on the phone regarding recommended disposition. Writer informed Zita of Pt's desire to discharge in order to make his court appearance. Writer also identified how Pt will be cabbed to the court house for his court date. Zita states she understands and is in agreement with recommended disposition.    At this time IP MH admission is not being recommended due to denial of SI, HI, AH/VH and displaying future focused thinking. Pt was able to safety plan with Writer. Pt's current presentation appears to be chronic in nature and Pt's current sx appear to be at Pt's baseline. Pt does appear to be at higher risk of death by suicide accidental or intentional due to mental health hx and substance use sx.  At this time IP MH admission does not appear to be the most therapeutically beneficial intervention/ level of care for Pt. Pt appears to be able to use and motivated to engage in supportive mental health/ community resources.       Legal Status: Legal Status: Voluntary/Patient has signed consent for treatment    Session Status: Time session started: 0940  Time session ended: 0943  Session Duration (minutes): 3 minutes  Session Number: 1  Anticipated number of sessions or this episode of care: 1    Session Start Time: 0940  Session Stop Time: 0943  CPT codes: Non-Billable  Time Spent: 3 minutes      CPT code(s) utilized: Non-Billable    Diagnosis:   Patient Active Problem List   Diagnosis Code    CARDIOVASCULAR SCREENING; LDL GOAL LESS THAN 160 Z13.6    Cholecystitis K81.9    Suicidal ideation R45.851    Auditory hallucinations R44.0    Depression with anxiety F41.8    Schizoaffective disorder, depressive type (H) F25.1    ADHD (attention deficit hyperactivity disorder), 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     Polysubstance use disorder F19.90    Major depression F32.9    Other migraine without status migrainosus, not intractable G43.809    Depression, unspecified depression type F32.A    Abnormal EKG R94.31    Anxiety F41.9    Dental caries K02.9    Bulimia nervosa F50.20    Elevated blood pressure reading R03.0    Family history of hypertension Z82.49    History of methamphetamine use F15.91    Hyperlipidemia E78.5    Hypertension I10    Microcytic anemia D50.9    Nicotine dependence F17.200    Opioid use disorder in remission F11.91    Body dysmorphic disorder F45.22    Depression, major, recurrent F33.9    Schizoaffective disorder (H) F25.9    Encounter for monitoring opioid maintenance therapy Z51.81, Z79.891    Schizophrenia (H) F20.9    Methamphetamine use (H) F15.10    Stimulant use disorder F15.90    Opioid use disorder F11.90    Methamphetamine use disorder, moderate (H) F15.20    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       Primary Problem This Admission: Active Hospital Problems    Methamphetamine use disorder, moderate (H)      Stimulant-induced psychotic disorder (H)      Schizophrenia (H)      Methamphetamine use (H)      Stimulant use disorder      Opioid use disorder      Depression, unspecified depression type      ADHD (attention deficit hyperactivity disorder), inattentive type      Polysubstance use disorder      Schizoaffective disorder, depressive type (H)      Suicidal ideation      Anxiety        Fermin Nur, [facility]   Licensed Mental Health Professional (LMHP), [facility]  [phone]

## 2025-02-04 NOTE — PROGRESS NOTES
Patient woke up around 0130 and was noted to be urinating on floor in sensory room A. When approached, pt stated     I was sleepwalking.  Pt was directed to chair in milieu and remained in chair rest of shift. The sensory room was clean and disinfected.  No distress or further concerns noted for the rest of shift. Patient appeared to sleep well thereafter.      Plan: Waiting on bed at [facility] w/CD assessment this morning. Pt states they have court at 2pm today and requires a cab ride. Still need utox.

## 2025-02-05 ENCOUNTER — TELEPHONE (OUTPATIENT)
Dept: BEHAVIORAL HEALTH | Facility: CLINIC | Age: 45
End: 2025-02-05
Payer: COMMERCIAL

## 2025-02-06 ENCOUNTER — PATIENT OUTREACH (OUTPATIENT)
Dept: CARE COORDINATION | Facility: CLINIC | Age: 45
End: 2025-02-06
Payer: COMMERCIAL

## 2025-02-06 NOTE — PROGRESS NOTES
Connected Care Resource Center Contact  [facility]/Voicemail     Clinical Data: Post-Discharge Outreach     Outreach attempted x 2.  Left message on patient's voicemail, providing [facility]'s central phone number of [phone] ([phone]) for questions/concerns and/or to schedule an appt with an [facility] provider, if they do not have a PCP.      Plan:  [facility] will do no further outreaches at this time.       PETR Ann  Connected [facility] Resource Center, [facility]    *Connected Care Resource Team does NOT follow patient ongoing. Referrals are identified based on internal discharge reports and the outreach is to ensure patient has an understanding of their discharge instructions.

## 2025-02-12 ENCOUNTER — HOSPITAL ENCOUNTER (INPATIENT)
Facility: CLINIC | Age: 45
End: 2025-02-12
Attending: EMERGENCY MEDICINE | Admitting: STUDENT IN AN ORGANIZED HEALTH CARE EDUCATION/TRAINING PROGRAM
Payer: COMMERCIAL

## 2025-02-12 DIAGNOSIS — F17.210 CIGARETTE NICOTINE DEPENDENCE WITHOUT COMPLICATION: ICD-10-CM

## 2025-02-12 DIAGNOSIS — F33.1 MODERATE EPISODE OF RECURRENT MAJOR DEPRESSIVE DISORDER (H): ICD-10-CM

## 2025-02-12 DIAGNOSIS — F41.9 ANXIETY: ICD-10-CM

## 2025-02-12 DIAGNOSIS — F10.90 ALCOHOL USE DISORDER: ICD-10-CM

## 2025-02-12 DIAGNOSIS — F25.9 SCHIZOAFFECTIVE DISORDER, UNSPECIFIED TYPE (H): ICD-10-CM

## 2025-02-12 DIAGNOSIS — I10 PRIMARY HYPERTENSION: ICD-10-CM

## 2025-02-12 DIAGNOSIS — I16.0 HYPERTENSIVE URGENCY: ICD-10-CM

## 2025-02-12 DIAGNOSIS — F15.959 STIMULANT-INDUCED PSYCHOTIC DISORDER (H): ICD-10-CM

## 2025-02-12 DIAGNOSIS — R00.0 TACHYCARDIA, UNSPECIFIED: Primary | ICD-10-CM

## 2025-02-12 DIAGNOSIS — R45.851 SUICIDAL THOUGHTS: ICD-10-CM

## 2025-02-12 PROCEDURE — 99285 EMERGENCY DEPT VISIT HI MDM: CPT | Performed by: EMERGENCY MEDICINE

## 2025-02-12 PROCEDURE — HZ2ZZZZ DETOXIFICATION SERVICES FOR SUBSTANCE ABUSE TREATMENT: ICD-10-PCS | Performed by: STUDENT IN AN ORGANIZED HEALTH CARE EDUCATION/TRAINING PROGRAM

## 2025-02-12 PROCEDURE — 82075 ASSAY OF BREATH ETHANOL: CPT | Performed by: EMERGENCY MEDICINE

## 2025-02-12 PROCEDURE — 250N000013 HC RX MED GY IP 250 OP 250 PS 637: Performed by: EMERGENCY MEDICINE

## 2025-02-12 RX ORDER — LORAZEPAM 1 MG/1
1-4 TABLET ORAL EVERY 30 MIN PRN
Status: DISCONTINUED | OUTPATIENT
Start: 2025-02-12 | End: 2025-02-13

## 2025-02-12 RX ADMIN — LORAZEPAM 1 MG: 1 TABLET ORAL at 21:33

## 2025-02-12 ASSESSMENT — ACTIVITIES OF DAILY LIVING (ADL)
ADLS_ACUITY_SCORE: 47

## 2025-02-13 ENCOUNTER — TELEPHONE (OUTPATIENT)
Dept: BEHAVIORAL HEALTH | Facility: CLINIC | Age: 45
End: 2025-02-13
Payer: COMMERCIAL

## 2025-02-13 ENCOUNTER — TELEPHONE (OUTPATIENT)
Dept: BEHAVIORAL HEALTH | Facility: CLINIC | Age: 45
End: 2025-02-13

## 2025-02-13 VITALS
BODY MASS INDEX: 29.51 KG/M2 | HEIGHT: 65 IN | DIASTOLIC BLOOD PRESSURE: 100 MMHG | WEIGHT: 177.1 LBS | TEMPERATURE: 97.6 F | HEART RATE: 77 BPM | SYSTOLIC BLOOD PRESSURE: 187 MMHG | RESPIRATION RATE: 16 BRPM | OXYGEN SATURATION: 99 %

## 2025-02-13 PROBLEM — R45.851 SUICIDAL THOUGHTS: Status: ACTIVE | Noted: 2025-02-13

## 2025-02-13 PROBLEM — F10.20 ALCOHOL DEPENDENCE (H): Status: ACTIVE | Noted: 2025-02-13

## 2025-02-13 PROBLEM — F10.90 ALCOHOL USE DISORDER: Status: ACTIVE | Noted: 2025-02-13

## 2025-02-13 PROBLEM — F32.9 MDD (MAJOR DEPRESSIVE DISORDER): Status: ACTIVE | Noted: 2024-12-07

## 2025-02-13 LAB
ALBUMIN SERPL BCG-MCNC: 4.1 G/DL (ref 3.5–5.2)
ALBUMIN UR-MCNC: NEGATIVE MG/DL
ALCOHOL BREATH TEST: 0 (ref 0–0.01)
ALP SERPL-CCNC: 79 U/L (ref 40–150)
ALT SERPL W P-5'-P-CCNC: 13 U/L (ref 0–70)
AMPHETAMINES UR QL SCN: NORMAL
ANION GAP SERPL CALCULATED.3IONS-SCNC: 8 MMOL/L (ref 7–15)
APPEARANCE UR: CLEAR
AST SERPL W P-5'-P-CCNC: 13 U/L (ref 0–45)
BARBITURATES UR QL SCN: NORMAL
BASOPHILS # BLD AUTO: 0 10E3/UL (ref 0–0.2)
BASOPHILS NFR BLD AUTO: 0 %
BENZODIAZ UR QL SCN: NORMAL
BILIRUB SERPL-MCNC: 0.3 MG/DL
BILIRUB UR QL STRIP: NEGATIVE
BUN SERPL-MCNC: 14.2 MG/DL (ref 6–20)
BZE UR QL SCN: NORMAL
CALCIUM SERPL-MCNC: 9 MG/DL (ref 8.8–10.4)
CANNABINOIDS UR QL SCN: NORMAL
CHLORIDE SERPL-SCNC: 110 MMOL/L (ref 98–107)
COLOR UR AUTO: ABNORMAL
CREAT SERPL-MCNC: 0.98 MG/DL (ref 0.51–1.17)
EGFRCR SERPLBLD CKD-EPI 2021: 74 ML/MIN/1.73M2
EOSINOPHIL # BLD AUTO: 0.1 10E3/UL (ref 0–0.7)
EOSINOPHIL NFR BLD AUTO: 1 %
ERYTHROCYTE [DISTWIDTH] IN BLOOD BY AUTOMATED COUNT: 13.3 % (ref 10–15)
FENTANYL UR QL: NORMAL
GLUCOSE SERPL-MCNC: 91 MG/DL (ref 70–99)
GLUCOSE UR STRIP-MCNC: NEGATIVE MG/DL
HCO3 SERPL-SCNC: 25 MMOL/L (ref 22–29)
HCT VFR BLD AUTO: 41.6 % (ref 35–53)
HGB BLD-MCNC: 13.8 G/DL (ref 11.7–17.7)
HGB UR QL STRIP: NEGATIVE
IMM GRANULOCYTES # BLD: 0 10E3/UL
IMM GRANULOCYTES NFR BLD: 0 %
KETONES UR STRIP-MCNC: NEGATIVE MG/DL
LEUKOCYTE ESTERASE UR QL STRIP: NEGATIVE
LYMPHOCYTES # BLD AUTO: 2.5 10E3/UL (ref 0.8–5.3)
LYMPHOCYTES NFR BLD AUTO: 36 %
MAGNESIUM SERPL-MCNC: 2 MG/DL (ref 1.7–2.3)
MCH RBC QN AUTO: 27.4 PG (ref 26.5–33)
MCHC RBC AUTO-ENTMCNC: 33.2 G/DL (ref 31.5–36.5)
MCV RBC AUTO: 83 FL (ref 78–100)
MONOCYTES # BLD AUTO: 0.5 10E3/UL (ref 0–1.3)
MONOCYTES NFR BLD AUTO: 8 %
MUCOUS THREADS #/AREA URNS LPF: PRESENT /LPF
NEUTROPHILS # BLD AUTO: 3.9 10E3/UL (ref 1.6–8.3)
NEUTROPHILS NFR BLD AUTO: 55 %
NITRATE UR QL: NEGATIVE
NRBC # BLD AUTO: 0 10E3/UL
NRBC BLD AUTO-RTO: 0 /100
OPIATES UR QL SCN: NORMAL
PCP QUAL URINE (ROCHE): NORMAL
PH UR STRIP: 5.5 [PH] (ref 5–7)
PHOSPHATE SERPL-MCNC: 3.6 MG/DL (ref 2.5–4.5)
PLATELET # BLD AUTO: 262 10E3/UL (ref 150–450)
POTASSIUM SERPL-SCNC: 4 MMOL/L (ref 3.4–5.3)
PROT SERPL-MCNC: 6.4 G/DL (ref 6.4–8.3)
RBC # BLD AUTO: 5.04 10E6/UL (ref 3.8–5.9)
RBC URINE: 0 /HPF
SODIUM SERPL-SCNC: 143 MMOL/L (ref 135–145)
SP GR UR STRIP: 1.01 (ref 1–1.03)
SQUAMOUS EPITHELIAL: 3 /HPF
UROBILINOGEN UR STRIP-MCNC: NORMAL MG/DL
WBC # BLD AUTO: 7.1 10E3/UL (ref 4–11)
WBC URINE: 1 /HPF

## 2025-02-13 PROCEDURE — 99255 IP/OBS CONSLTJ NEW/EST HI 80: CPT

## 2025-02-13 PROCEDURE — 99222 1ST HOSP IP/OBS MODERATE 55: CPT | Mod: AI

## 2025-02-13 PROCEDURE — 85025 COMPLETE CBC W/AUTO DIFF WBC: CPT | Performed by: EMERGENCY MEDICINE

## 2025-02-13 PROCEDURE — 82435 ASSAY OF BLOOD CHLORIDE: CPT

## 2025-02-13 PROCEDURE — 250N000013 HC RX MED GY IP 250 OP 250 PS 637: Performed by: EMERGENCY MEDICINE

## 2025-02-13 PROCEDURE — 84460 ALANINE AMINO (ALT) (SGPT): CPT

## 2025-02-13 PROCEDURE — 82570 ASSAY OF URINE CREATININE: CPT

## 2025-02-13 PROCEDURE — 82043 UR ALBUMIN QUANTITATIVE: CPT

## 2025-02-13 PROCEDURE — 120N000002 HC R&B MED SURG/OB UMMC

## 2025-02-13 PROCEDURE — 250N000013 HC RX MED GY IP 250 OP 250 PS 637

## 2025-02-13 PROCEDURE — 81001 URINALYSIS AUTO W/SCOPE: CPT

## 2025-02-13 PROCEDURE — 36415 COLL VENOUS BLD VENIPUNCTURE: CPT

## 2025-02-13 PROCEDURE — 99207 PR NO CHARGE LOS: CPT | Performed by: NURSE PRACTITIONER

## 2025-02-13 PROCEDURE — 84100 ASSAY OF PHOSPHORUS: CPT

## 2025-02-13 PROCEDURE — 80307 DRUG TEST PRSMV CHEM ANLYZR: CPT | Performed by: EMERGENCY MEDICINE

## 2025-02-13 PROCEDURE — 83735 ASSAY OF MAGNESIUM: CPT

## 2025-02-13 RX ORDER — FOLIC ACID 1 MG/1
1 TABLET ORAL DAILY
Status: DISCONTINUED | OUTPATIENT
Start: 2025-02-16 | End: 2025-02-13

## 2025-02-13 RX ORDER — FOLIC ACID 5 MG/ML
1 INJECTION, SOLUTION INTRAMUSCULAR; INTRAVENOUS; SUBCUTANEOUS DAILY
Status: DISCONTINUED | OUTPATIENT
Start: 2025-02-14 | End: 2025-02-13

## 2025-02-13 RX ORDER — CALCIUM CARBONATE 500 MG/1
1000 TABLET, CHEWABLE ORAL 4 TIMES DAILY PRN
Status: DISCONTINUED | OUTPATIENT
Start: 2025-02-13 | End: 2025-02-17 | Stop reason: HOSPADM

## 2025-02-13 RX ORDER — GABAPENTIN 100 MG/1
100 CAPSULE ORAL EVERY 8 HOURS
Status: DISCONTINUED | OUTPATIENT
Start: 2025-02-20 | End: 2025-02-14

## 2025-02-13 RX ORDER — OLANZAPINE 5 MG/1
5 TABLET ORAL
Status: DISCONTINUED | OUTPATIENT
Start: 2025-02-13 | End: 2025-02-17 | Stop reason: HOSPADM

## 2025-02-13 RX ORDER — THIAMINE HYDROCHLORIDE 100 MG/ML
200 INJECTION, SOLUTION INTRAMUSCULAR; INTRAVENOUS 3 TIMES DAILY
Status: DISCONTINUED | OUTPATIENT
Start: 2025-02-13 | End: 2025-02-13

## 2025-02-13 RX ORDER — ACETAMINOPHEN 325 MG/1
650 TABLET ORAL EVERY 4 HOURS PRN
Status: DISCONTINUED | OUTPATIENT
Start: 2025-02-13 | End: 2025-02-17 | Stop reason: HOSPADM

## 2025-02-13 RX ORDER — FOLIC ACID 5 MG/ML
1 INJECTION, SOLUTION INTRAMUSCULAR; INTRAVENOUS; SUBCUTANEOUS ONCE
Status: DISCONTINUED | OUTPATIENT
Start: 2025-02-13 | End: 2025-02-13

## 2025-02-13 RX ORDER — DIAZEPAM 10 MG/1
10 TABLET ORAL EVERY 30 MIN PRN
Status: DISCONTINUED | OUTPATIENT
Start: 2025-02-13 | End: 2025-02-14

## 2025-02-13 RX ORDER — GABAPENTIN 300 MG/1
900 CAPSULE ORAL EVERY 8 HOURS
Status: DISCONTINUED | OUTPATIENT
Start: 2025-02-13 | End: 2025-02-14

## 2025-02-13 RX ORDER — LOSARTAN POTASSIUM 25 MG/1
25 TABLET ORAL DAILY
Status: DISCONTINUED | OUTPATIENT
Start: 2025-02-14 | End: 2025-02-16

## 2025-02-13 RX ORDER — IBUPROFEN 600 MG/1
600 TABLET, FILM COATED ORAL ONCE
Status: COMPLETED | OUTPATIENT
Start: 2025-02-13 | End: 2025-02-13

## 2025-02-13 RX ORDER — FOLIC ACID 1 MG/1
1 TABLET ORAL DAILY
Status: DISCONTINUED | OUTPATIENT
Start: 2025-02-13 | End: 2025-02-17 | Stop reason: HOSPADM

## 2025-02-13 RX ORDER — FLUMAZENIL 0.1 MG/ML
0.2 INJECTION, SOLUTION INTRAVENOUS
Status: DISCONTINUED | OUTPATIENT
Start: 2025-02-13 | End: 2025-02-14

## 2025-02-13 RX ORDER — GABAPENTIN 300 MG/1
600 CAPSULE ORAL EVERY 8 HOURS
Status: DISCONTINUED | OUTPATIENT
Start: 2025-02-16 | End: 2025-02-14

## 2025-02-13 RX ORDER — LIDOCAINE 40 MG/G
CREAM TOPICAL
Status: DISCONTINUED | OUTPATIENT
Start: 2025-02-13 | End: 2025-02-17 | Stop reason: HOSPADM

## 2025-02-13 RX ORDER — GABAPENTIN 600 MG/1
1200 TABLET ORAL ONCE
Status: COMPLETED | OUTPATIENT
Start: 2025-02-13 | End: 2025-02-13

## 2025-02-13 RX ORDER — AMOXICILLIN 250 MG
2 CAPSULE ORAL 2 TIMES DAILY PRN
Status: DISCONTINUED | OUTPATIENT
Start: 2025-02-13 | End: 2025-02-17 | Stop reason: HOSPADM

## 2025-02-13 RX ORDER — DIAZEPAM 10 MG/2ML
5-10 INJECTION, SOLUTION INTRAMUSCULAR; INTRAVENOUS EVERY 30 MIN PRN
Status: DISCONTINUED | OUTPATIENT
Start: 2025-02-13 | End: 2025-02-14

## 2025-02-13 RX ORDER — GABAPENTIN 300 MG/1
300 CAPSULE ORAL EVERY 8 HOURS
Status: DISCONTINUED | OUTPATIENT
Start: 2025-02-18 | End: 2025-02-14

## 2025-02-13 RX ORDER — LOSARTAN POTASSIUM 25 MG/1
25 TABLET ORAL ONCE
Status: COMPLETED | OUTPATIENT
Start: 2025-02-13 | End: 2025-02-13

## 2025-02-13 RX ORDER — AMOXICILLIN 250 MG
1 CAPSULE ORAL 2 TIMES DAILY PRN
Status: DISCONTINUED | OUTPATIENT
Start: 2025-02-13 | End: 2025-02-17 | Stop reason: HOSPADM

## 2025-02-13 RX ORDER — MULTIPLE VITAMINS W/ MINERALS TAB 9MG-400MCG
1 TAB ORAL DAILY
Status: DISCONTINUED | OUTPATIENT
Start: 2025-02-13 | End: 2025-02-17 | Stop reason: HOSPADM

## 2025-02-13 RX ORDER — ACETAMINOPHEN 325 MG/1
975 TABLET ORAL ONCE
Status: COMPLETED | OUTPATIENT
Start: 2025-02-13 | End: 2025-02-13

## 2025-02-13 RX ORDER — HALOPERIDOL 5 MG/ML
2.5-5 INJECTION INTRAMUSCULAR EVERY 4 HOURS PRN
Status: DISCONTINUED | OUTPATIENT
Start: 2025-02-13 | End: 2025-02-13

## 2025-02-13 RX ADMIN — DIAZEPAM 10 MG: 10 TABLET ORAL at 19:53

## 2025-02-13 RX ADMIN — THIAMINE HCL TAB 100 MG 200 MG: 100 TAB at 19:49

## 2025-02-13 RX ADMIN — LORAZEPAM 2 MG: 1 TABLET ORAL at 03:21

## 2025-02-13 RX ADMIN — ACETAMINOPHEN 975 MG: 325 TABLET, FILM COATED ORAL at 08:56

## 2025-02-13 RX ADMIN — DIAZEPAM 10 MG: 10 TABLET ORAL at 17:24

## 2025-02-13 RX ADMIN — IBUPROFEN 600 MG: 600 TABLET ORAL at 16:32

## 2025-02-13 RX ADMIN — LORAZEPAM 2 MG: 1 TABLET ORAL at 05:48

## 2025-02-13 RX ADMIN — LOSARTAN POTASSIUM 25 MG: 25 TABLET, FILM COATED ORAL at 05:48

## 2025-02-13 ASSESSMENT — ACTIVITIES OF DAILY LIVING (ADL)
ADLS_ACUITY_SCORE: 47
ADLS_ACUITY_SCORE: 47
ADLS_ACUITY_SCORE: 52
ADLS_ACUITY_SCORE: 52
ADLS_ACUITY_SCORE: 47
ADLS_ACUITY_SCORE: 52
ADLS_ACUITY_SCORE: 47
ADLS_ACUITY_SCORE: 52
ADLS_ACUITY_SCORE: 47
ADLS_ACUITY_SCORE: 52
ADLS_ACUITY_SCORE: 52
ADLS_ACUITY_SCORE: 47

## 2025-02-13 NOTE — ED TRIAGE NOTES
"    Patient is interested in doing detox program, is also reporting active S.I. with a plan. Last drink earlier today around 8am, reports \"binge drinking\" regularly for aboud 2-3months. Patient also reports mis-using Klonopin, has old prescription, last took \"a couple days ago.\"  "

## 2025-02-13 NOTE — ED NOTES
MD ordered breathalyzer, pt refused saying he is uncomfortable with that and has PTSD associated with this. I asked if pt would be more comfortable having someone else perform the breathalyzer, they denied again.

## 2025-02-13 NOTE — TELEPHONE ENCOUNTER
7:42 AM  Intake received call from [facility] ED Dr. Monet that pt will be admitting to medical. Intake to remove from [facility] to [facility] work list. [facility] updated Intake no longer following pt for placement.

## 2025-02-13 NOTE — TELEPHONE ENCOUNTER
S: [facility] , DEC  Lucy  calling at 3:31 Am about a 44 year old/NB presenting with SI.      B: Pt arrived via Self . Presenting problem, stressors: Pt is presenting with substance use (alcohol, meth) and SI with plan to jump of bridge. Pt also endorses VH of seeing God. Triggers/Stressors: Worried about safety of friend.     Pt affect in ED: Irritable  and Labile  Pt Dx: Major Depressive Disorder, Generalized Anxiety Disorder, Schizophrenia, Borderline Personality Disorder, ADHD, OCD, Substance Use Disorder: Alcohol and meth, and Body dysmorphia  Previous [facility] hx? Yes: [facility] Sept 2023  Pt endorses SI with a plan to jump off a bridge    Hx of suicide attempt? Yes: 2 via jumping off bridge  Pt endorses SIB via cutting, most recent episode few weeks ago  Pt denies HI   Pt endorses visual hallucination .   Pt RARS Score: 3    Hx of aggression/violence, sexual offenses, legal concerns, Epic care plan? describe: No  Current concerns for aggression this visit? No  Does pt have a history of Civil Commitment? Yes, most recent commitment Dec [address]- July 2024  Is Pt their own guardian? Yes- Guardianship returned to them in January    Pt is not prescribed medication. Is patient medication compliant? No  Pt denies OP services   CD concerns: Actively using/consuming Alcohol and meth  Acute or chronic medical concerns: No- Medically cleared  Does Pt present with specific needs, assistive devices, or exclusionary criteria? None      Pt is ambulatory  Pt is able to perform ADLs independently      A: Pt to be reviewed for [facility] admission. Pt is Voluntary  Preferred placement: [facility]    COVID Symptoms: No  If yes, COVID test required   Utox: Ordered, not yet collected   CMP: Ordered, not yet collected   CBC: Ordered, not yet collected   HCG: Not ordered, intake to request lab     R: Patient cleared and ready for behavioral bed placement: Yes  Pt placed on [facility] worklist? Yes    Does Patient need a Transfer Center request  created? No, Pt is located within [facility] ED, [facility] ED, or [facility]

## 2025-02-13 NOTE — ED NOTES
"Patient's BP at 0300 was 175/131. Patient denies discomfort. He refused BP rechecked. He stated \"That's enough!\" Patient was given Ativan 2 mg for withdrawal. Patient was informed that this writer will come back to recheck the vitals. BP around 0530 was 187/121. Patient was asked what medication she has been taken at home for hypertension. Patient refused to answer.   "

## 2025-02-13 NOTE — CONSULTS
"Diagnostic Evaluation Consultation  Crisis Assessment    Patient Name: Bria Breen  Age:  44 year old  Legal Sex: female  Gender Identity: other  Pronouns: he/him/his  Race: White  Ethnicity: Not  or   Language: English      Patient was assessed: In person   Crisis Assessment Start Date: 02/13/25  Crisis Assessment Start Time: 0215  Crisis Assessment Stop Time: 0231  Patient location: [facility] Emergency Department                             ED12    Referral Data and Chief Complaint  Bria Breen presents to the ED by  self. Patient is presenting to the ED for the following concerns: Substance use, Suicidal ideation. Factors that make the mental health crisis life threatening or complex are: PT presented to [facility] ED via self due to substance use and SI concerns.  PT reports that the past couple of weeks they have experienced increased SI and substance use.  PT endorses SI plan with intent via jumping off of a bridge.  PT also endorses daily substance use with alcohol (last use last night) and meth (last use a day ago).  PT reports having the following withdrawal symptoms: Headaches and nausea.  PT endorses VH of seeing \"God \"but denies AH during the assessment.  However after the assessment was inform by ED nursing staff that PT reported \"hearing voices\" and with they observed paranoia/delusional thoughts.  PT endorses NSSI current urges via cutting with a reported last attempts a few days ago.  PT reports that they have been off of their medications the past few months.  PT reports current stressors being worried about her friend safety.  PT reports that they are seeking either detox or inpatient as they do not feel that they can keep themselves safe if discharged.  PT is open to inpatient stay..      Informed Consent and Assessment Methods  Explained the crisis assessment process, including applicable information disclosures and limits to confidentiality, assessed understanding of the " "process, and obtained consent to proceed with the assessment.  Assessment methods included conducting a formal interview with patient, review of medical records, collaboration with medical staff, and obtaining relevant collateral information from family and community providers when available.  : done     History of the Crisis   PT has Hx of past diagnoses of ADHD, JOHANNA, MDD, OCD, BPD, schizophrenia and body dysmorphia.  PT has Hx of SI with prior to attempts via jumping off of bridges.  PT reports SI thoughts as \"taunting.. negative... I need to come myself \".  PT reports Hx of NSSI via cutting as well as hallucinations.  PT has Hx of MICD inpatient stay with the latest being from 9/27/2023 to 10/5/2023 at [facility].  PT reports no Hx with programmatic care as well as not currently seeing any psychiatric medication management providers/therapists.  PTs appeared to be guarded and labile which impacted gathering historical information regarding the crisis.  PT has Hx of MICD commitment from 12/1/2023 to 7/1/2024.  Per chart review, it appears that PT had a guardianship with her mother up until this past January 2025 but there appears to be no paperwork confirming this.    Brief Psychosocial History  Family:  Single, Children no  Support System:  None  Employment Status:  unemployed  Source of Income:  other (see comments) (Financial support her mother)  Financial Environmental Concerns:  none  Current Hobbies:  social media/computer activities  Barriers in Personal Life:  emotional concerns, mental health concerns    Significant Clinical History  Current Anxiety Symptoms:  anxious, excessive worry  Current Depression/Trauma:  thoughts of death/suicide, sadness, hopelessness, helplessness, irritable, impaired decision making, crying or feels like crying, withdrawl/isolation, difficulty concentrating  Current Somatic Symptoms:  anxious, excessive worry  Current Psychosis/Thought Disturbance:  auditory hallucinations, visual " hallucinations, high risk behavior, agitation, anger, impulsive  Current Eating Symptoms:   (None reported)  Chemical Use History:  Alcohol: Daily  Last Use:: 02/12/25  Benzodiazepines: None  Opiates: None  Cocaine: None  Marijuana: None  Other Use: Methamphetamines  Last Use::  (Reported a few days ago)  Withdrawal Symptoms: Nausea (Headaches)  Addictions:  (None reported)   Past diagnosis:  ADHD, Anxiety Disorder, Depression, Personality Disorder, Suicide attempt(s), Schizophrenia, Substance Use Disorder, Other (BPD and body dysmorphia)  Family history:  No known history of mental health or chemical health concerns  Past treatment:  Psychiatric Medication Management, Individual therapy, Inpatient Hospitalization, Civil Commitment  Details of most recent treatment:     Other relevant history:       Have there been any medication changes in the past two weeks:  no       Is the patient compliant with medications:  no (PT has been off medication for last 2 months)        Collateral Information  Is there collateral information: No (Writer attempted to call pt's mother/guardian, Zita Castro,at [phone] on 2/13 at 0046. Left  with [facility] coordinator callback number.)             Risk Assessment  [place] Suicide Severity Rating Scale Full Clinical Version:  Suicidal Ideation  Q1 Wish to be Dead (Lifetime): Yes  Q2 Non-Specific Active Suicidal Thoughts (Lifetime): Yes  3. Active Suicidal Ideation with any Methods (Not Plan) Without Intent to Act (Lifetime): Yes  4. Active Suicidal Ideation with Some Intent to Act, Without Specific Plan (Lifetime): Yes  5. Active Suicidal Ideation with Specific Plan and Intent (Lifetime): Yes  Q6 Suicide Behavior (Lifetime): yes  Intensity of Ideation (Lifetime)  Most Severe Ideation Rating (Lifetime): 5  Description of Most Severe Ideation (Lifetime): negative thoughts..taunting.. I should kill myself  Frequency (Lifetime): Many times each day  Duration (Lifetime): More than 8  hours/persistent or continuous  Controllability (Lifetime): Unable to control thoughts  Deterrents (Lifetime): Deterrents definitely did not stop you  Reasons for Ideation (Lifetime): Completely to end or stop the pain (You couldn't go on living with the pain or how you were feeling)  Suicidal Behavior (Lifetime)  Actual Attempt (Lifetime): Yes  Total Number of Actual Attempts (Lifetime): 2  Actual Attempt Description (Lifetime): jumping off a bridge  Has subject engaged in non-suicidal self-injurious behavior? (Lifetime): Yes (via cutting)  Interrupted Attempts (Lifetime): No  Aborted or Self-Interrupted Attempt (Lifetime): No  Preparatory Acts or Behavior (Lifetime): Yes    [place] Suicide Severity Rating Scale Recent:   Suicidal Ideation (Recent)  Q1 Wished to be Dead (Past Month): yes  Q2 Suicidal Thoughts (Past Month): yes  Q3 Suicidal Thought Method: yes  Q4 Suicidal Intent without Specific Plan: yes  Q5 Suicide Intent with Specific Plan: yes  If yes to Q6, within past 3 months?: yes  Level of Risk per Screen: high risk  Intensity of Ideation (Recent)  Most Severe Ideation Rating (Past 1 Month): 5  Description of Most Severe Ideation (Past 1 Month): negative thoughts..taunting.. I should kill myself  Frequency (Past 1 Month): Many times each day  Duration (Past 1 Month): More than 8 hours/persistent or continuous  Controllability (Past 1 Month): Unable to control thoughts  Deterrents (Past 1 Month): Uncertain that deterrents stopped you  Reasons for Ideation (Past 1 Month): Mostly to end or stop the pain (You couldn't go on living with the pain or how you were feeling)  Suicidal Behavior (Recent)  Actual Attempt (Past 3 Months): No  Has subject engaged in non-suicidal self-injurious behavior? (Past 3 Months): Yes (via cutting)  Interrupted Attempts (Past 3 Months): No  Aborted or Self-Interrupted Attempt (Past 3 Months): No  Preparatory Acts or Behavior (Past 3 Months): No    Environmental or Psychosocial  Events: ongoing abuse of substances, other life stressors, helplessness/hopelessness, challenging interpersonal relationships  Protective Factors: Protective Factors: help seeking, able to access care without barriers, lives in a responsibly safe and stable environment    Does the patient have thoughts of harming others? Feels Like Hurting Others: no  Previous Attempt to Hurt Others: no  Current presentation: Irritable (Guarded)  Is the patient engaging in sexually inappropriate behavior?: no  Does Patient have a known history of aggressive behavior: No    Is the patient engaging in sexually inappropriate behavior?  no        Mental Status Exam   Affect: Labile  Appearance: Disheveled  Attention Span/Concentration:  (varible)  Eye Contact: Variable    Fund of Knowledge: Appropriate   Language /Speech Content: Fluent  Language /Speech Volume: Soft, Loud  Language /Speech Rate/Productions: Pressured, Slow, Normal  Recent Memory: Variable  Remote Memory: Variable  Mood: Anxious, Angry, Depressed, Sad, Irritable  Orientation to Person: Yes   Orientation to Place: Yes  Orientation to Time of Day: Yes  Orientation to Date: Yes     Situation (Do they understand why they are here?): Yes  Psychomotor Behavior: Normal  Thought Content: Hallucinations, Suicidal  Thought Form: Goal Directed    Medication  Psychotropic medications:   Medication Orders - Psychiatric (From admission, onward)      Start     Dose/Rate Route Frequency Ordered Stop    02/12/25 2012  LORazepam (ATIVAN) tablet 1-4 mg         1-4 mg Oral EVERY 30 MIN PRN 02/12/25 2012               Current Care Team  Patient Care Team:  Radha Zeng PA-C as PCP - General    Diagnosis  Patient Active Problem List   Diagnosis Code    CARDIOVASCULAR SCREENING; LDL GOAL LESS THAN 160 Z13.6    Cholecystitis K81.9    Suicidal ideation R45.851    Auditory hallucinations R44.0    Depression with anxiety F41.8    Schizoaffective disorder, depressive type (H) F25.1    ADHD  "(attention deficit hyperactivity disorder), 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    Polysubstance use disorder F19.90    MDD (major depressive disorder) F32.9    Other migraine without status migrainosus, not intractable G43.809    Depression, unspecified depression type F32.A    Abnormal EKG R94.31    Anxiety F41.9    Dental caries K02.9    Bulimia nervosa F50.20    Elevated blood pressure reading R03.0    Family history of hypertension Z82.49    History of methamphetamine use F15.91    Hyperlipidemia E78.5    Hypertension I10    Microcytic anemia D50.9    Nicotine dependence F17.200    Opioid use disorder in remission F11.91    Body dysmorphic disorder F45.22    Depression, major, recurrent F33.9    Schizoaffective disorder (H) F25.9    Encounter for monitoring opioid maintenance therapy Z51.81, Z79.891    Schizophrenia (H) F20.9    Methamphetamine use (H) F15.10    Stimulant use disorder F15.90    Opioid use disorder F11.90    Methamphetamine use disorder, moderate (H) F15.20    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 At this time the pt is presenting as an acute risk to self or others due to the following factors: Pt presented to the ED due to substance abuse and SI concerns.  PT continues to endorse SI with plans and intent to be of jumping off of a bridge.  PT endorses NSSI urges, and hallucination of \"God\" PT endorses daily substance use of alcohol and meth.  Pt denies HI.  ED staff reported observing PT having paranoia and " delusional thoughts. PT was observed to be labile and guarded throughout the assessment.  PT reports being off of medication for the past 2 months but is not able to give much information due to irritability level. PT has Hx of MICD commitments from 12/1/2023 to 7/1/2024.  Per chart review, it appears that guardianship for PT was discontinued this January 2025 and PT is her own legal guardian ; however it appears there is no paperwork in the chart to confirm this. PT reportedly no current ongoing outpatient support with medication management, therapy, and programmatic care, so it will be beneficial to connect as well as refer PT to these after [facility] IP discharge.    Goals for crisis stabilization:  Decrease in SI and MICD IP for medication stabilization    Next steps for Care Team:  psych consult, safety planning and connecting [facility] MICD outpatient services post MICD inpatient stay    Treatment Objectives Addressed:  rapport building, identifying an appropriate aftercare plan, assessing safety, processing feelings    Therapeutic Interventions:  Engaged in cognitive restructuring/ reframing, looked at common cognitive distortions and challenged negative thoughts., Engaged in guided discovery, explored patient's perspectives and helped expand them through socratic dialogue., Explored motivation for behavioral change.    Has a specific means been identified for suicidal/homicide actions: Yes    If yes, describe:  PT endorses SI plan via jumping off of bridge    Explain action steps toward mitigation:  PT was added to MICD IP work list for mental health and medication stabilization due to worsening SI and reported hallucinations.    Document completion of mitigation actions:  Done    The follow up action still needed prior to discharge:  Psych consult, safety planning and connecting with MICD outpatient services prior to discharge    Patient coping skills attempted to reduce the crisis:  sleeping and coming to  ED    Disposition  Recommended referrals: Individual Therapy, Medication Management, Programmatic Care        Reviewed case and recommendations with attending provider. Attending Name: Jodie hollingsworth       Attending concurs with disposition: yes       Patient and/or validated legal guardian concurs with disposition:   yes       Final disposition:  inpatient mental health         Imminent risk of harm: Suicidal Behavior  Severe psychiatric, behavioral or other comorbid conditions are appropriate for management at inpatient mental health as indicated by at least one of the following: Psychiatric Symptoms  Severe dysfunction in daily living is present as indicated by at least one of the following: Other evidence of severe dysfunction  Situation and expectations are appropriate for inpatient care: Patient management/treatment at lower level of care is not feasible or is inappropriate  Inpatient mental health services are necessary to meet patient needs and at least one of the following: Specific condition related to admission diagnosis is present and judged likely to deteriorate in absence of treatment at proposed level of care      Legal status: Voluntary/Patient has signed consent for treatment                                                                                                                                 Reviewed court records: yes       Assessment Details   Total duration spent with the patient: 16 min     CPT code(s) utilized: 34979 - Psychotherapy (with patient) - 30 (16-37*) min    LAURA Clark, Psychotherapist  [facility] - Triage & Transition Services  Callback:  [phone]

## 2025-02-13 NOTE — PROGRESS NOTES
Brief SW Note:    SW found Order Terminating Guardianship (Court File No. [id number]), effective January 2, 2025, on [place] Court Records Online (MCRO); however document is not signed by .    CECIL reached out to [facility] Attorney's Office to request the signed court order. CECIL was told that [facility] Attorney's office could not assist because it appears to be a private pursuit of guardianship vs the courts pursuing it - both attorneys involved are out of the office today. CECIL provided contact information for follow-up tomorrow.    [facility] Attorney's Office  PH: [phone]  Fax: [phone]  (ATTN:  Group)  [email or web address]       Next Steps: SW to urgently send signed Order Terminating Guardianship to [employer] once received.          Covering   PETR CALDERON   PHONE: [phone]     Weekday 8 MS  PH: [phone]     CECIL Coverage SEARCHABLE in [facility] - search 8 MS SW  (or by name)  Or click on link below:  8 Med Surg Vocera

## 2025-02-13 NOTE — PLAN OF CARE
"Bria E Nuha  February 13, 2025  Plan of Care Hand-off Note     Patient Recommended Care Path: inpatient mental health    Clinical Substantiation:  It is the recommendation of this clinician that pt is admitted into IP mental health for stabilization. At this time the pt is presenting as an acute risk to self or others due to the following factors: Pt presented to the ED due to substance abuse and SI concerns.  PT continues to endorse SI with plans and intent to be of jumping off of a bridge.  PT endorses NSSI urges, and hallucination of \"God\" PT endorses daily substance use of alcohol and meth.  Pt denies HI.  ED staff reported observing PT having paranoia and delusional thoughts. PT was observed to be labile and guarded throughout the assessment.  PT reports being off of medication for the past 2 months but is not able to give much information due to irritability level. PT has Hx of MICD commitments from 12/1/2023 to 7/1/2024.  Per chart review, it appears that guardianship for PT was discontinued this January 2025 and PT is her own legal guardian ; however it appears there is no paperwork in the chart to confirm this. PT reportedly no current ongoing outpatient support with medication management, therapy, and programmatic care, so it will be beneficial to connect as well as refer PT to these after [facility] IP discharge.    Goals for crisis stabilization:  Decrease in SI and MICD IP for medication stabilization    Next steps for Care Team:  psych consult, safety planning and connecting [facility] MICD outpatient services post MICD inpatient stay    Treatment Objectives Addressed:  rapport building, identifying an appropriate aftercare plan, assessing safety, processing feelings    Therapeutic Interventions:  Engaged in cognitive restructuring/ reframing, looked at common cognitive distortions and challenged negative thoughts., Engaged in guided discovery, explored patient's perspectives and helped expand them through " socratic dialogue., Explored motivation for behavioral change.    Has a specific means been identified for suicidal.homicide actions: Yes  If yes, describe: PT endorses SI plan via jumping off of bridge  Explain action steps toward mitigation: PT was added to [facility] IP work list for mental health and medication stabilization due to worsening SI and reported hallucinations.  Document completion of mitigation action: Done  The follow up action still needed prior to discharge: Psych consult, safety planning and connecting with [facility] outpatient services prior to discharge    Patient coping skills attempted to reduce the crisis:  sleeping and coming to ED       Imminent risk of harm: Suicidal Behavior  Severe psychiatric, behavioral or other comorbid conditions are appropriate for management at inpatient mental health as indicated by at least one of the following: Psychiatric Symptoms  Severe dysfunction in daily living is present as indicated by at least one of the following: Other evidence of severe dysfunction  Situation and expectations are appropriate for inpatient care: Patient management/treatment at lower level of care is not feasible or is inappropriate  Inpatient mental health services are necessary to meet patient needs and at least one of the following: Specific condition related to admission diagnosis is present and judged likely to deteriorate in absence of treatment at proposed level of care      Collateral contact information:       Legal Status: Voluntary/Patient has signed consent for treatment                                                                                                                                 Reviewed court records: yes     Psychiatry Consult: Patient has Psychiatry Consult Order    LAURA Clark
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
## 2025-02-13 NOTE — ED NOTES
IP MH Referral Acuity Rating Score (RARS)    LMHP complete at referral to IP MH, with DEC; and, daily while awaiting IP MH placement. Call score to PPS.  CRITERIA SCORING   New 74 HH and Involuntary for IP MH (not adolescent) 0/3   Boarding over 24 hours 0/1   Vulnerable adult at least 55+ with multiple co morbidities; or, Patient age 11 or under 0/1   Suicide ideation without relief of precipitating factors 1/1   Current plan for suicide 1/1   Current plan for homicide 0/1   Imminent risk or actual attempt to seriously harm another without relief of factors precipitating the attempt 0/1   Severe dysfunction in daily living (ex: complete neglect for self care, extreme disruption in vegetative function, extreme deterioration in social interactions) 0/1   Recent (last 2 weeks) or current physical aggression in the ED 0/1   Restraints or seclusion episode in ED 0/1   Verbal aggression, agitation, yelling, etc., while in the ED 1/1   Active psychosis with psychomotor agitation or catatonia 0/1   Need for constant or near constant redirection (from leaving, from others, etc).  0/1   Intrusive or disruptive behaviors 0/1   TOTAL 3

## 2025-02-13 NOTE — ED NOTES
DEC ACKNOWLEDGED. Writer attempted to meet with patient for DEC. Patient declined and reported wanted to sleep/need more time. Not ready for meeting at this time. Please let team know when patient is able and ready to participate.
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
## 2025-02-13 NOTE — ED PROVIDER NOTES
The pt was accepted at shift change sign out pending mental health evaluation. Pt reporting SI with plan and intent - jumping off bridge. Has been having urges to self harm, but last self harm was a couple of weeks ago. Endorses visual hallucinations - seeing God. Continued substance use with alcohol and meth. Reports that they started taking meds a couple of months ago. The  is recommending inpt mental health admission. Pt voluntary at this time.      Jodie Quarles MD  02/13/25 8176    It was reported to me by the patient's nurse that there blood pressure was escalating.  They had been getting benzos for potential withdrawal, but that was not helping blood pressure.  I did review their meds, did order losartan per their med list.  The patient seemed very disinterested in speaking with me when I attempted to inquire about their meds, however they were feeling.  Patient was not cooperative.  The patient continued to decline IV start or any IV medications.  After losartan, which they admitted and taken a long time, blood pressure went from 187/121 to 173/133.  Given diastolics over 130, I do not think the patient is medically stable to be admitted to psychiatry.  They will be admitted to family medicine for further treatment.  The family medicine physician will review the chart and make further determination on medications.    Dictation Disclaimer: Some of this Note has been completed with voice-recognition dictation software. Although errors are generally corrected real-time, there is the potential for a rare error to be present in the completed chart.         Jodie Quarles MD  02/13/25 7222

## 2025-02-13 NOTE — PLAN OF CARE
"[facility] [place]    Patient admitted to room 804 at approximately 1100 via cart from emergency room. Patient was accompanied by transport tech.     Verbal SBAR report received from ED RN prior to patient arrival.     Patient trasferred to bed via self. Patient alert and oriented X self, place, situation. Pain is controlled with current analgesics.  Medication(s) being used: ibuprofen (OTC). Pt initially complained of headache pain of 10/10. Ibuprofen was was brought to room and patient was asleep, pt declined having a HA and refused medications. Admission vital signs: Blood pressure (!) 172/118, pulse 88, temperature 97.3  F (36.3  C), temperature source Oral, resp. rate 16, height 1.651 m (5' 5\"), weight 80.3 kg (177 lb 1.6 oz), SpO2 97%. Pt refused additional attempts to retrieve a blood pressure. Patient was oriented to plan of care, call light, bed controls, tv, telephone, bathroom, and visiting hours.    Verified that pt does have IV access in R AC. 1:1 Sitter at bedside for suicidal ideations.      Risk Assessment    The following safety risks were identified during admission: fall, flight, and harm to self. Yellow risk band applied: YES.     Skin Initial Assessment    This writer admitted this patient and completed a full skin assessment and Edd score in the Adult PCS flowsheet.   Photo documentation of skin problem and/or wound competed via Cassatt application (located under Media):  No    Appropriate interventions initiated as needed.     Skin check not completed due to: Patient Refusal    Edd Risk Assessment  Sensory Perception: 4-->no impairment  Moisture: 4-->rarely moist  Activity: 3-->walks occasionally  Mobility: 4-->no limitation  Nutrition: 3-->adequate  Friction and Shear: 3-->no apparent problem  Edd Score: 21    Education    Patient has a Eckley to Observation order: No  Observation education completed and documented: N/A      Ana Rodriguez RN        "

## 2025-02-13 NOTE — PROGRESS NOTES
02/13/25 0044   Collateral Information   Is there collateral information No  (Writer attempted to call pt's mother/guardian, Zita Castro,at [phone] on 2/13 at 0046. Left VM with BEC coordinator callback number.)
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
## 2025-02-13 NOTE — ED NOTES
Pt refused blood draw, IV, and IV fluids. Attempted to educate pt on benefits, pt still denied, MD notified.

## 2025-02-14 ENCOUNTER — TELEPHONE (OUTPATIENT)
Dept: BEHAVIORAL HEALTH | Facility: CLINIC | Age: 45
End: 2025-02-14

## 2025-02-14 LAB
CREAT UR-MCNC: 80 MG/DL
MICROALBUMIN UR-MCNC: <12 MG/L
MICROALBUMIN/CREAT UR: NORMAL MG/G{CREAT}

## 2025-02-14 PROCEDURE — 120N000002 HC R&B MED SURG/OB UMMC

## 2025-02-14 PROCEDURE — 250N000013 HC RX MED GY IP 250 OP 250 PS 637

## 2025-02-14 PROCEDURE — 99232 SBSQ HOSP IP/OBS MODERATE 35: CPT

## 2025-02-14 PROCEDURE — 99232 SBSQ HOSP IP/OBS MODERATE 35: CPT | Mod: GC

## 2025-02-14 RX ORDER — VENLAFAXINE HYDROCHLORIDE 37.5 MG/1
37.5 CAPSULE, EXTENDED RELEASE ORAL
Status: DISCONTINUED | OUTPATIENT
Start: 2025-02-14 | End: 2025-02-17 | Stop reason: HOSPADM

## 2025-02-14 RX ADMIN — LOSARTAN POTASSIUM 25 MG: 25 TABLET, FILM COATED ORAL at 10:20

## 2025-02-14 RX ADMIN — DIAZEPAM 10 MG: 10 TABLET ORAL at 10:20

## 2025-02-14 RX ADMIN — DIAZEPAM 10 MG: 10 TABLET ORAL at 05:57

## 2025-02-14 RX ADMIN — FOLIC ACID 1 MG: 1 TABLET ORAL at 10:20

## 2025-02-14 RX ADMIN — THIAMINE HCL TAB 100 MG 200 MG: 100 TAB at 22:23

## 2025-02-14 RX ADMIN — Medication 1 TABLET: at 10:20

## 2025-02-14 RX ADMIN — THIAMINE HCL TAB 100 MG 200 MG: 100 TAB at 10:20

## 2025-02-14 ASSESSMENT — ACTIVITIES OF DAILY LIVING (ADL)
ADLS_ACUITY_SCORE: 52

## 2025-02-14 NOTE — PLAN OF CARE
Goal Outcome Evaluation: [id number]      Plan of Care Reviewed With: patient (to be referred as he - goes by Cam)    Overall Patient Progress: no changeOverall Patient Progress: no change     Pt awake and alert start of shift, pt noted to be anxious, but calm and cooperative. Pt declined gabapentin. Pt in and out of sleep. CIWA completed and valium administered X2. First recheck not completed as pt was sleeping. Noted more anxiety in the morning, and paranoia, noting he does not like the 1:1 staff that was covering a break. No vitals from previous, pt has been refusing. Requested 1:1 to try this morning.     Valuables and all cables from room, out of room and in the suicide cart in the hallway. Continue with POC.

## 2025-02-14 NOTE — CONSULTS
2/14/2025  Pt completed their ESCOBAR CA today. Pt would like to attend IP ESCOBAR treatment in the [facility] area. Pt prefers a ESCOBAR tx program where they can vape outside. Pt is open to any gender specific program. I emailed an DEEDEE for [facility] to pt's unit SW for pt to sign. Pt's ESCOBAR CA was sent to [facility] today. Pt was referred to [facility] today.    [facility] Service Initiation Date ID: [id number]    Recommendations:   1)  Complete a residential based or similar treatment program.  2)  Abstain from all mood-altering chemicals unless prescribed by a licensed provider.   3)  Attend, at minimum, 2 weekly support group meetings, such as 12 step based (AA/NA), SMART Recovery, Health Realizations, and/or Refuge Recovery meetings.     4)  Actively work with a mentor/sponsor on a weekly basis.   5)  Follow all the recommendations of your treatment/medical providers.    Clinical Substantiation:    Pt has a long history of substance use. They are open to IP ESCOBAR treatment because they are tired of getting high. Pt reports they want to get sober and make changes.     Referrals/ Alternatives:  [facility]:  Access Team for Referrals  Phone: [phone]  Fax: [phone]  Email: [email or web address]   Email: [email or web address]  [email or web address]    [facility]  [address] [place] [address]  Admissions Phone: [phone]  [facility] waitlist: [phone]  [email or web address]    ESCOBAR consult completed by: YANN Azevedo.  Phone Number: [phone]  E-mail Address: [email or web address] Evaluation Department  [address] [place] [address]     *Due to regulation of Title 42 of the Code of Federal Regulations (CFR) Part 2: Confidentiality laws apply to this  note and the information wherein.  Thus, this note cannot be copy and pasted into any other health care staff's note nor can it be included in general medical records sent to ANY outside agency without the patient's written consent.

## 2025-02-14 NOTE — TELEPHONE ENCOUNTER
R: [place]  Access Inpatient Bed Call Log 2/13/2025 @3:35 PM:     Intake has called facilities that have not updated their bed status within the last 12 hours.    [facility] is posting 0 beds.                  [facility] is posting 3 beds. [phone]. Per Adryan @ 3:39PM, they are at capacity   [facility] is posting 0 beds. Negative covid required.  [facility] is posting 0 beds. Neg covid. No high school/Savi-psych. [phone]. Per Elfego @ 3:40PM, high acuity and SD beds available - reviewing 2 other referrals on [facility]. Will try back later   [facility] is posting 0 beds. [phone].   [facility] is posting 0 beds. [phone].   [facility] is posting 6 beds. (Ages 18-35) Negative covid, no aggression, physical or sexual assault, violence hx or drug abuse, or psychosis.  [phone]. Per Cristopher @ 3:41PM, reviewing for last YA bed. Pt is not within age requirement  [facility] is posting 0 beds.    [facility] is posting 2 beds [phone]. No answer @ 3:46PM, will call back later. Per Clarice they are at capacity until 9AM    Pt remains on the work list pending appropriate bed availability.

## 2025-02-14 NOTE — PROGRESS NOTES
"[facility]    Progress Note - [facility] [place] [facility]       Date of Admission:  2/12/2025    Assessment & Plan   Cam Breen is a 44 year old adult with history of polysubstance use (alcohol, methamphetamine, opioids), HTN, MDD with SI, and anxiety who was admitted on 2/12/2025 for worsened SI with recommendation for inpatient psychiatric hospitalization. Admitted to medicine for management of his hypertension, which is stable. SI now resolved, no longer recommending psych admission, and currently working on transition to IP ESCOBAR treatment.     # Alcohol use disorder  # Opioid use disorder  On admission, UDS and BAL negative. Unclear how much patient has been drinking PTA when he states he has \"4-5 drinks of vodka\" per day, but patient is overall well appearing with no sign of withdrawal symptoms on exam. Continues to be well appearing without signs of withdrawal, and scoring on CIWA mostly attributed to anxiety, so discontinued CIWA protocol on 2/14.   Per PDMP, patient has been on suboxone in the past with last refill for 15 day supply on 12/30/24. Previous dose was 8 mg BID. Psych addressed this with him today and patient did not want to restart suboxone.   Unknown if patient has had recent methamphetamine use, but UDS is negative for amphetamines.  - Discontinue CIWA protocol  - Discontinued gabapentin taper; pt reports has been on this before and was not helpful  - Chemical dependency consult   - Patient wishes to go to IP ESCOBAR treatment   - Patient can be accepted to [facility] on 2/17  - Folic acid 1 mg daily  - PO thiamine     # Suicidal ideation, resolved  # MDD  Endorsed SI with plan and intent on admission; DEC  saw patient in ED and recommended inpatient psychiatric admission for mood stabilization. Patient has not been taking any medications for several months. 2/14 patient reports no SI, SIB, AVH. Psych assessed and no longer " recommend IP psych admission. Patient stated that if he were to be discharged prior to admission to IP ESCOBAR treatment, he would kill himself. Does not have current active SI, but endorses a plan and intent if he were to be discharged.  - Psych consulted, appreciate recommendations  - Discontinued 1:1 sitter/ q 15 min checks  - Removed from IP psych wait list. Recommend ESCOBAR treatment  - Restart Effexor at 37.5 mg daily  - Suicide precautions    # Hypertension  BP has been 170-180/100-130 since arrival to the ED. Has known history of HTN and white coat hypertension with blood pressures much higher in healthcare settings than at home, per chart review of PCP's note. Has not taken PTA Losartan in several months. Patient is well appearing with no signs of organ damage on labs. Pt reports his headache has improved, and denies new symptoms including CP/vision changes/dyspnea. For asymptomatic hypertension, no need to treat pressures with IV PRN medications, but will work on optimizing outpatient hypertension regimen. Restarted PTA Losartan 25 mg daily.   - Losartan 25 mg daily  - Tylenol 650 mg q4h PRN  - Consider repeat lab work +/- imaging (CT Head) if patient develops new symptoms concerning for hypertensive emergency     # Tobacco dependence  - nicotine lozenge q1h PRN    # Guardianship status  Psych and SW teams clarified current guardianship status: patient is currently his own guardian. His mother is no longer his guardian. Patient is legally able to make his own healthcare decisions.        Diet: Combination Diet Regular Diet Adult    DVT Prophylaxis: Low Risk/Ambulatory with no VTE prophylaxis indicated  Ross Catheter: Not present  Fluids: PO  Lines: None     Cardiac Monitoring: None  Code Status: Full Code      Clinically Significant Risk Factors          # Hyperchloremia: Highest Cl = 110 mmol/L in last 2 days, will monitor as appropriate              # Hypertension: Noted on problem list            #  "Overweight: Estimated body mass index is 29.47 kg/m  as calculated from the following:    Height as of this encounter: 1.651 m (5' 5\").    Weight as of this encounter: 80.3 kg (177 lb 1.6 oz)., PRESENT ON ADMISSION     # Financial/Environmental Concerns: none  # Support System: poor social support noted in nursing assessment         Social Drivers of Health   Food Insecurity: Patient Declined (2/10/2025)    Received from [facility]    Hunger Vital Sign     Worried About Running Out of Food in the Last Year: Patient declined     Ran Out of Food in the Last Year: Patient declined   Housing Stability: High Risk (2/10/2025)    Received from [facility]    Housing Stability     What is your living situation today?: I do not have a steady place to live (I am temporarily staying with others, in a hotel, in a murray...   Tobacco Use: Low Risk  (2/10/2025)    Received from [facility]    Patient History     Smoking Tobacco Use: Never     Smokeless Tobacco Use: Never   Recent Concern: Tobacco Use - High Risk (12/30/2024)    Patient History     Smoking Tobacco Use: Some Days     Smokeless Tobacco Use: Never   Financial Resource Strain: High Risk (2/22/2023)    Received from [facility]    Overall Financial Resource Strain (CARDIA)     How hard is it for you to pay for the very basics like food, housing, medical care, and heating?: Very hard   Alcohol Use: Alcohol Misuse (9/27/2023)    AUDIT-C     Frequency of Alcohol Consumption: 2-4 times a month     Average Number of Drinks: 3 or 4     Frequency of Binge Drinking: Monthly   Transportation Needs: Unmet Transportation Needs (2/10/2025)    Received from [facility]    PRAPARE - Transportation     Lack of Transportation (Medical): Yes     Lack of Transportation (Non-Medical): Patient declined   Interpersonal Safety: Patient Declined (2/10/2025)    Received from [facility]    Humiliation, Afraid, Rape, and Kick questionnaire     Fear of Current or " Ex-Partner: Patient declined     Emotionally Abused: Patient declined     Physically Abused: Patient declined     Sexually Abused: Patient declined   Stress: Stress Concern Present (2/22/2023)    Received from [facility]    Citizen of [place] of Occupational Health - Occupational Stress Questionnaire     Do you feel stress - tense, restless, nervous, or anxious, or unable to sleep at night because your mind is troubled all the time - these days?: Very much   Social Connections: Socially Isolated (2/22/2023)    Received from [facility]    Social Connection and Isolation Panel [NHANES]     Frequency of Communication with Friends and Family: Once a week     Frequency of Social Gatherings with Friends and Family: Never     Attends Denominational Services: Never     Active Member of Clubs or Organizations: No     Attends Club or Organization Meetings: Never     Marital Status: Never          Disposition Plan        The patient's care was discussed with the Attending Physician, Dr. Obregon .    Bro Brooke MD  [facility]  [facility]  Securely message with [employer] (more info)  Text page via [employer] Paging/Directory   See signed in provider for up to date coverage information  ______________________________________________________________________    Interval History   No overnight events. Significantly more alert and interactive today. Reports he normally has acute/worsened SI when high/altered on a substance or when withdrawing from a substance, but denies current SI now. Confirms he has no plan or intent, and would like to go to IP ESCOBAR treatment.   Patient stated that if he were to be discharged prior to admission to IP ESCOBAR treatment, he would kill himself. Does not have current active SI, but endorses a plan and intent if he were to be discharged.     Physical Exam   Vital Signs: Temp: 97.5  F  (36.4  C) Temp src: Oral BP: (!) 168/112 Pulse: 89   Resp: 16   O2 Device: None (Room air)    Weight: 177 lbs 1.6 oz    Constitutional: alert, sitting up in bed, involved in conversation  ENT: Normocephalic, without obvious abnormality, atraumatic, moist mucous membranes  Respiratory: No increased work of breathing  Cardiovascular: Regular rate  Neurologic: Awake, alert, no focal deficits.   Neuropsychiatric: General: calm overall, but does become irritable quickly. Denies SI, no plan or intent. Denies SIB. Speech is normal, not slowed or tangential. Patient is oriented x3.    Medical Decision Making   Please see A&P for additional details of medical decision making.      Data         Imaging results reviewed over the past 24 hrs:   No results found for this or any previous visit (from the past 24 hours).

## 2025-02-14 NOTE — PROGRESS NOTES
"Order Terminating Guardianship (Court File No. [id number]-) found on [place] Court Records Online (MCRO), this copy is signed by  and therefore is enforceable.    SW sent copy via email, marked urgently, to [facility] [employer] to validate document and update patient's chart.  It can be assumed now that patient does not have a guardian.     CECIL informed resident doctor of the above information.     Current plan is to transfer to IP .    The following things must be completed in order for patient to transfer to IP :    1) Patient must be medically stable and independent. Provider to write \"patient medically stable for transfer to IP MH\" in daily progress note.  2) Psychiatry consult in the last 48 hours that recommends an IP MH stay.  3) Charge RN to call Psych Intake (P: [phone]) to add patient to wait list.     Social Work/Care management does not assist with transfers to IP MH.    ADDENDUM 2:53 PM  Informed by provider that patient no longer needs IP MH. Plan is for discharge today. Referrals have been placed for IP ESCOBAR Tx, but it is not standard for patients to wait in the hospital until bed is available.   Received DEEDEE for [place] from [facility].   SW met with patient at bedside to obtain signature on DEEDEE. There was a wet spot on the table so SW stated \"careful of the wet spot so the paper doesn't get wet. Patient then grabbed the paper and moved it right into the wet spot, looked up and smiled at SW. Patient signed DEEDEE. No further questions.  DEEDEE dried to best of ability and then placed in patient's hard chart.    Patient set to go to [facility] on Monday at 10am. Will need 30 days medications sent with them and nurse-to-nurse 1 hour before.      No further care management intervention anticipated at this time.  Please re-consult if further needs arise.  Care management signing off.            GOYO DoshiW, LSW  8 Med Surg   [facility]  Phone: " [phone]  Vocera: Searchable by name or group: 8 Med Surg Vocera

## 2025-02-14 NOTE — TELEPHONE ENCOUNTER
2/14/2025  Pt is interested in [facility].    LP SCREEN TELEPHONE NOTE   Bria Breen paperwork was reviewed by YANN Azevedo and the patient was deemed ELIGIBLE for the LP program.     Inpatient or Community Referral: Inpatient referral     Medical Screening (As Needed): currently on UR 8MS     Regular use of benzodiazapine's (other than detox): occasional use      Insurance: [facility] Department is responsible for obtaining ALL authorizations for treatment services at the EOP, DOP and LP levels of care.           IV use or Pregnant: This patient is an IV drug user and will have PRIORITY status.     Business office: [phone]          Group: Men's Group, Women's Group, or Mixed Group     Additional Info as needed: Pt does not have a gender preference for programming.      The best current contact telephone number for the patient is: [phone]

## 2025-02-14 NOTE — TELEPHONE ENCOUNTER
Date: 2/14/2025    To: NIKIA RN    Please call this patient at [phone] to complete a medical screening to clarify their medications and medical condition and to complete a COVID-19 screening.      The patient has a history of:  Pt is currently hospitalized on unit 8 m/s.  Has stabilized medically and psychiatrically as the recommendations have gone from IP Psych after med stabilization to not needing IP psych according to Yenny Horan.   HBP due to withdrawal      INSIDE: This patient had a substance abuse assessment with Caity Hidalgo MA, YANN , so no paperwork will be sent up to the 6th floor because all of the clinical documentation is in the patient's electronic medical record in [facility].      Thanks,  YANN Verma

## 2025-02-14 NOTE — TELEPHONE ENCOUNTER
Writer and other Admissions Counselor Morris STRONG Went to visit pt. Pt appeared a little distressed about leaving the hospital as they were afraid they would go out and continue use. Pt lived in a group home on and off throughout their life and reports SI when they are coming down off the drugs. Pt denies any current SI and a willingness to attend LP. They have a court date next week to transfer criminal court case to  court. Pt was cooperative and had questions about the LP program.

## 2025-02-14 NOTE — DISCHARGE INSTRUCTIONS
Some possible Direct Access ESCOBAR programs include:  [facility]:  Access Team for Referrals  Phone: [phone]  Fax: [phone]  Email: [email or web address]   Email: [email or web address]  [email or web address]://www.[email or web address]/programs/skcscpvwzjz-ngkrisvfh-[email or web address]/    [place] [facility]/IP  Phone: [phone]  Fax: [phone]  [email or web address]  Locations: [place], [place], [place], [place], [place]                Same-day, Walk-in Assessments Appointments: 8a-2p                Same day Intakes, contingent on bed availability                Transports provided throughout [place]  **[place] [facility] [place]:  Admissions Department- [place]  Intake Address: [address] [place] [address]  Phone: [phone]  Fax: [phone]  Email: [email or web address]    [facility]:  (methadone friendly)   RESIDENTIAL ADMISSIONS ([facility], [facility], [facility])  Phone: [phone]  Fax: [phone]  Email: [email or web address]  [email or web address]    [facility] IP  [address] [id number]  Phone: [phone]  Fax: [phone]  Email: [email or web address]  [email or web address]/[email or web address]/    [facility]:  Assessments  Community Health[address] [place] [address]  Phone: [phone]  Fax: [phone]  email: [email or web address]  [email or web address]    [facility] (IP- Men & Women)  General Admissions  [address] [place] [address]  Phone: [phone]  Fax:  [phone]  Email: [email or web address]  [email or web address]
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
## 2025-02-14 NOTE — PROGRESS NOTES
2/14/2025  Pt's Resident doctor requested additional ESCOBAR tx programs that may be able to accept pt ASAP. Pt's referrals to [facility] and [facility] are pending at this time. It may take 24 hours for their admissions team to review and get back to pt. I suggested the below programs maybe accommodate this pt if they have bed availability. Pt can call the programs directly to get started. The below information was placed in pt's AVS today.    Some possible Direct Access ESCOBAR programs include:  [facility]:  Access Team for Referrals  Phone: [phone]  Fax: [phone]  Email: [email or web address]   Email: [email or web address]  [email or web address]    [place] [facility]  Phone: [phone]  Fax: [phone]  [email or web address]  Locations: [place], [place], [place], [place], [place]   Same-day, Walk-in Assessments Appointments: 8a-2p   Same day Intakes, contingent on bed availability   Transports provided throughout [place]  **[place] [facility] [place]:  Admissions Department- [place]  Intake Address: [address] [place] [address]  Phone: [phone]  Fax: [phone]  Email: [email or web address]    [facility]:  (methadone friendly)   RESIDENTIAL ADMISSIONS ([facility], [facility], [facility])  Phone: [phone]  Fax: [phone]  Email: [email or web address]  [email or web address]    [facility] IP  [address] [place] [address]  Phone: [phone]  Fax: [phone]  Email: [email or web address]  [email or web address]    [facility]:  Assessments  [address] [place] [address]  Phone: [phone]  Fax: [phone]  email: [email or web address]  [email or web address]    [facility] (IP- Men & Women)  General Admissions  [address] [place] [address]  Phone: [phone]  Fax:  [phone]  Email: [email or web address]  [email or web address]      Caity Hidalgo MA [facility]  ESCOBAR Evaluation Counselor  [phone]  [email or web address]
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
## 2025-02-15 PROCEDURE — 99231 SBSQ HOSP IP/OBS SF/LOW 25: CPT | Mod: GC

## 2025-02-15 PROCEDURE — 120N000002 HC R&B MED SURG/OB UMMC

## 2025-02-15 ASSESSMENT — ACTIVITIES OF DAILY LIVING (ADL)
ADLS_ACUITY_SCORE: 52

## 2025-02-15 NOTE — PLAN OF CARE
Goal Outcome Evaluation:    Shift [id number]    Pt is alert and orientated x4, independent in room, denied CP, SOB, and N/V. No acute changes overnight. Call light within reach, able to make needs known, continue with plan of care

## 2025-02-15 NOTE — PLAN OF CARE
Goal Outcome Evaluation:      Plan of Care Reviewed With: patient    Overall Patient Progress: improvingOverall Patient Progress: improving    Outcome Evaluation: pt AOx4. denies c/p & n/v. pt ind in room . provider d/c 1:1 sitter. pt [facility]. pt can become verbally aggressive toward staffing    PT no longer is on Ciwa scoring. Discontinued per provider. Monitor for obvious signs of withdrawl

## 2025-02-15 NOTE — PROGRESS NOTES
"[facility]    Progress Note - [facility] Medicine Service       Date of Admission:  2/12/2025    Major Plans  - [facility] on 2/17    Assessment & Plan   Cam Breen is a 44 year old adult with history of polysubstance use (alcohol, methamphetamine, opioids), HTN, MDD with SI, and anxiety who was admitted on 2/12/2025 for worsened SI with recommendation for inpatient psychiatric hospitalization. Admitted to medicine for management of his hypertension, which is stable. SI now resolved, no longer recommending psych admission, and currently working on transition to IP ESCOBAR treatment.     # Alcohol use disorder  # Opioid use disorder  On admission, UDS and BAL negative. Unclear how much patient has been drinking PTA when he states he has \"4-5 drinks of vodka\" per day, but patient is overall well appearing with no sign of withdrawal symptoms on exam. Continues to be well appearing without signs of withdrawal, and scoring on CIWA mostly attributed to anxiety, so discontinued CIWA protocol on 2/14.   Per PDMP, patient has been on suboxone in the past with last refill for 15 day supply on 12/30/24. Previous dose was 8 mg BID. Psych addressed this with him today and patient did not want to restart suboxone.   Unknown if patient has had recent methamphetamine use, but UDS is negative for amphetamines.    - Chemical dependency consult   - Patient wishes to go to IP ESCOBAR treatment   - [facility] can be accepted [facility]  - Folic acid 1 mg daily  - PO thiamine     # Suicidal ideation, resolved  # MDD  Endorsed SI with plan and intent on admission; DEC  saw patient in ED and recommended inpatient psychiatric admission for mood stabilization. Patient has not been taking any medications for several months. 2/14 patient reports no SI, SIB, AVH. Psych assessed and no longer recommend IP psych admission. Patient stated that if he were to be discharged prior " to admission to IP ESCOBAR treatment, he would kill himself. Does not have current active SI, but endorses a plan and intent if he were to be discharged.  - Psych consulted, appreciate recommendations  - Discontinued 1:1 sitter/ q 15 min checks  - Removed from IP psych wait list. Recommend ESCOBAR treatment  - Restart Effexor at 37.5 mg daily  - Suicide precautions    # Hypertension, improving  BP has been 170-180/100-130 since arrival to the ED. Has known history of HTN and white coat hypertension with blood pressures much higher in healthcare settings than at home, per chart review of PCP's note. Has not taken PTA Losartan in several months. Patient is well appearing with no signs of organ damage on labs. Pt reports his headache has improved, and denies new symptoms including CP/vision changes/dyspnea. For asymptomatic hypertension, no need to treat pressures with IV PRN medications, but will work on optimizing outpatient hypertension regimen. Restarted PTA Losartan 25 mg daily.   - Losartan 25 mg daily  - Consider increase to 50mg on 2/15 (50mg also considered starting dose on Lexicomp)  - Tylenol 650 mg q4h PRN  - Consider repeat lab work +/- imaging (CT Head) if patient develops new symptoms concerning for hypertensive emergency     # Tobacco dependence  - nicotine lozenge q1h PRN    # Guardianship status  Psych and SW teams clarified current guardianship status: patient is currently his own guardian. His mother is no longer his guardian. Patient is legally able to make his own healthcare decisions.        Diet: Combination Diet Regular Diet Adult    DVT Prophylaxis: Low Risk/Ambulatory with no VTE prophylaxis indicated  Ross Catheter: Not present  Fluids: PO  Lines: None     Cardiac Monitoring: None  Code Status: Full Code      Clinically Significant Risk Factors                   # Hypertension: Noted on problem list            # Overweight: Estimated body mass index is 29.47 kg/m  as calculated from the  "following:    Height as of this encounter: 1.651 m (5' 5\").    Weight as of this encounter: 80.3 kg (177 lb 1.6 oz)., PRESENT ON ADMISSION       # Financial/Environmental Concerns: none  # Support System: poor social support noted in nursing assessment         Social Drivers of Health   Food Insecurity: Patient Declined (2/10/2025)    Received from [facility]    Hunger Vital Sign     Worried About Running Out of Food in the Last Year: Patient declined     Ran Out of Food in the Last Year: Patient declined   Housing Stability: High Risk (2/10/2025)    Received from [facility]    Housing Stability     What is your living situation today?: I do not have a steady place to live (I am temporarily staying with others, in a hotel, in a murray...   Tobacco Use: Low Risk  (2/10/2025)    Received from [facility]    Patient History     Smoking Tobacco Use: Never     Smokeless Tobacco Use: Never   Recent Concern: Tobacco Use - High Risk (12/30/2024)    Patient History     Smoking Tobacco Use: Some Days     Smokeless Tobacco Use: Never   Financial Resource Strain: High Risk (2/22/2023)    Received from [facility]    Overall Financial Resource Strain (CARDIA)     How hard is it for you to pay for the very basics like food, housing, medical care, and heating?: Very hard   Alcohol Use: Alcohol Misuse (9/27/2023)    AUDIT-C     Frequency of Alcohol Consumption: 2-4 times a month     Average Number of Drinks: 3 or 4     Frequency of Binge Drinking: Monthly   Transportation Needs: Unmet Transportation Needs (2/10/2025)    Received from [facility]    PRAPARE - Transportation     Lack of Transportation (Medical): Yes     Lack of Transportation (Non-Medical): Patient declined   Interpersonal Safety: Patient Declined (2/10/2025)    Received from [facility]    Humiliation, Afraid, Rape, and Kick questionnaire     Fear of Current or Ex-Partner: Patient declined     Emotionally Abused: Patient declined     " Physically Abused: Patient declined     Sexually Abused: Patient declined   Stress: Stress Concern Present (2/22/2023)    Received from [facility]    [employer] - Occupational Stress Questionnaire     Do you feel stress - tense, restless, nervous, or anxious, or unable to sleep at night because your mind is troubled all the time - these days?: Very much   Social Connections: Socially Isolated (2/22/2023)    Received from [facility]    Social Connection and Isolation Panel [NHANES]     Frequency of Communication with Friends and Family: Once a week     Frequency of Social Gatherings with Friends and Family: Never     Attends Baptism Services: Never     Active Member of Clubs or Organizations: No     Attends Club or Organization Meetings: Never     Marital Status: Never          Disposition Plan        The patient's care was discussed with the Attending Physician, Dr. Obregon .    Dawna Marcelo MD  [facility]  [facility]  Securely message with Vocera (more info)  Text page via [employer] Paging/Directory   See signed in provider for up to date coverage information  ______________________________________________________________________    Interval History   No overnight events.   No new concerns. Looking forward to discharge to [facility] on Monday.    Physical Exam   Vital Signs: Temp: 97.6  F (36.4  C) Temp src: Oral BP: (!) 157/123 Pulse: 85   Resp: 16 SpO2: 99 % O2 Device: None (Room air)    Weight: 177 lbs 1.6 oz  GENERAL: alert and no distress  HEENT: Eyes grossly normal to inspection, conjunctivae normal,  sclerae normal, external ears normal, and nose normal  RESP: no increased work of breathing, speaking in full sentences  MS: no gross musculoskeletal defects noted  NEURO: alert, moving extremities appropriately  PSYCH: mentation appears normal, affect  normal/bright     Medical Decision Making   Please see A&P for additional details of medical decision making.      Data         Imaging results reviewed over the past 24 hrs:   No results found for this or any previous visit (from the past 24 hours).

## 2025-02-15 NOTE — PROGRESS NOTES
Patient has been educated on potential risks of choosing to leave the unit and that the responsibility for patient well-being will belong to the patient. Pt has been informed that admission to hospital is due to need for medical treatment. Education given to the patient on some of the potential risks included but are not limited to:      - lack of access to nursing intervention      - possible missed appointments with MD, therapies, tests      - possible missed medications, antibiotics, management of IV's    Patient Response:Going to get some fresh air (told different staff member    Patient notified staff prior to leaving unit: Yes  Coban wrap placed over IV prior to pt leaving unit

## 2025-02-15 NOTE — TELEPHONE ENCOUNTER
"  February 15, 2025    Referral Medical Screening:  Per client self report and chart review.  Writer went to pt's hospital floor and met with pt in person.     1) Medications?  Losartan, venlafaxine, thiamine, multivitamin, folic acid  For hospital or any facility \"door to door\"...Nurse to nurse report required and \"too soon to refill\" medication issues must be resolved prior to pt admission.  We have no rounding provider at [facility] to asess medical issues or trouble shoot medication issues.    2) Medical conditions?  IV drug use, electrolyte imbalances, headaches  Respiratory Condition? (Asthma, COPD, RAD, Bronchitis, Emphysema, Cystic fibrosis, ALBERT, etc.)  None  If yes what kind? N/a  What medications or equipment do you use? (Nebulizer, inhalers, CPAP, BiPap...etc) n/a    Writer informed patient they need to bring all respiratory equipment / medications in order to [facility] to [facility]    3) Independent with ADL'S?  Yes     4) Assistive devices (ambulatory, orthotics, hearing, c-pap etc.)?  None    5) Fall risk?  None    6) Pain management concerns?  None    7) Dental health concerns?  None    8) Skin integrity concerns (wounds, rash, etc)?  None    9) Infectious disease concerns (MRSA, ESBL, VRE, C-Diff, TB, etc.)  None    10) Mental health concerns /suicidal ideation?  Pt was reporting active suicidal ideations upon admitting on 2/13/25. Pt has a history of suicidality and suicide attempts (2011). Pt has noted that when they are \"coming down off meth\" they often become suicidal. When writer met with pt today, pt denied thoughts of suicide. Per note from last night pt stated they would only become suicidal again if they were not allowed to go to treatment. Per psych note from 2/13/25 pt has \"hx of past diagnoses of ADHD, JOHANNA, MDD, OCD, BPD, schizophrenia and body dysmorphia.\" Pt is taking venlafaxine and has PRN Zyprexa while on the hospital floor. Psychiatry should be set up for pt when admitting to " [facility].    11) COVID-19 Symptom Screening Tool:     Do you have any of the following NEW or worsening symptoms NOT attributed to pre-existing conditions?    No,     Fever of 100.0  F (37.8 C) or over  Chills  Cough  Shortness of Breath  Loss of taste or smell  Generalized body aches  Persistent headache  Sore throat (or trouble eating or drinking in young children?)  Nausea, Vomiting, or diarrhea (loose stools)    Did you test positive for COVID-19 in the last 14  days or are you waiting on the test results due to an exposure or symptoms?  No,     Has anyone told you to self-quarantine due to exposure to someone with COVID-19?  No,      Positive screen - LPRN to reach out to [facility] Infection Prevention for advisement/recommendations     Based on above assessment: Client meets medical criteria for admission to [facility]    IS THIS PT CONSIDERED COMPLEX? No    IF patient is APPROVED for Admission to , they are informed of the following (below) by LPRN:    1) No drug or alcohol use for a minimum of 24 hours prior to admission to .  2) Should you acquire COVID or influenza symptoms, you may not admit to LP.  Call LPRN PRIOR TO ADMISSION for assessment / recommendations at [phone].  3)Structured outdoor activities may involve walking several city blocks. If you think you may have difficulty safely navigating this distance please speak with the LP Nurse.    4) Bring 30 day supply of all medications.  All OTC's must be sealed for use at   5) All medication issues must be resolved prior to admission.  We have no rounding provider at [facility] to assess medical issues or trouble shoot medication issues.    [facility]  Nurse Liaison / CD Adult [facility]  O: [phone]  Fax:[phone]  LPRN Pool [id number]  M-F: 7AM to 5:30PM   Sat-Sun: 7AM to 3PM  After hours: [phone]

## 2025-02-16 PROCEDURE — 120N000002 HC R&B MED SURG/OB UMMC

## 2025-02-16 PROCEDURE — 99232 SBSQ HOSP IP/OBS MODERATE 35: CPT | Mod: GC

## 2025-02-16 RX ORDER — VENLAFAXINE HYDROCHLORIDE 37.5 MG/1
37.5 CAPSULE, EXTENDED RELEASE ORAL DAILY
Qty: 30 CAPSULE | Refills: 0 | Status: SHIPPED | OUTPATIENT
Start: 2025-02-16

## 2025-02-16 RX ORDER — LOSARTAN POTASSIUM 50 MG/1
50 TABLET ORAL DAILY
Status: DISCONTINUED | OUTPATIENT
Start: 2025-02-16 | End: 2025-02-17 | Stop reason: HOSPADM

## 2025-02-16 RX ORDER — LOSARTAN POTASSIUM 50 MG/1
50 TABLET ORAL DAILY
Qty: 30 TABLET | Refills: 0 | Status: SHIPPED | OUTPATIENT
Start: 2025-02-16

## 2025-02-16 ASSESSMENT — ACTIVITIES OF DAILY LIVING (ADL)
ADLS_ACUITY_SCORE: 26
ADLS_ACUITY_SCORE: 26
ADLS_ACUITY_SCORE: 52
ADLS_ACUITY_SCORE: 26
ADLS_ACUITY_SCORE: 52
ADLS_ACUITY_SCORE: 26
ADLS_ACUITY_SCORE: 26
ADLS_ACUITY_SCORE: 52
ADLS_ACUITY_SCORE: 26
ADLS_ACUITY_SCORE: 52
ADLS_ACUITY_SCORE: 52
ADLS_ACUITY_SCORE: 26
ADLS_ACUITY_SCORE: 52
ADLS_ACUITY_SCORE: 52
ADLS_ACUITY_SCORE: 26
ADLS_ACUITY_SCORE: 52
ADLS_ACUITY_SCORE: 26
ADLS_ACUITY_SCORE: 52
ADLS_ACUITY_SCORE: 52

## 2025-02-16 NOTE — PROGRESS NOTES
Pt is alert and orientated x4, independent in room, denied CP, SOB, and N/V. No acute changes during shift.  Call light within reach, able to make needs known, however pt denied all medication passes , cares and vitals signs after multiple reattempts. Pt awaiting discharge  transfer to [facility] Monday 02/17/25.   continue with plan of care

## 2025-02-16 NOTE — PLAN OF CARE
Goal Outcome Evaluation:    Shift [id number]     Pt is alert and orientated x4, independent in room, denied CP, SOB, and N/V. Remained asleep throughout most of night. No acute changes overnight. Call light within reach, able to make needs known. Plan for discharge to [facility] 2/17, continue with plan of care

## 2025-02-16 NOTE — PROGRESS NOTES
"[facility]    Progress Note - [facility] Medicine Service       Date of Admission:  2/12/2025    Major Plans  - [facility] on 2/17  - Titrate up losartan to 50 mg daily    Assessment & Plan   Cam Breen is a 44 year old adult with history of polysubstance use (alcohol, methamphetamine, opioids), HTN, MDD with SI, and anxiety who was admitted on 2/12/2025 for worsened SI with recommendation for inpatient psychiatric hospitalization. Admitted to medicine for management of his hypertension, which is stable. SI now resolved, no longer recommending psych admission, and currently working on transition to IP ESCOBAR treatment.     # Alcohol use disorder  # Opioid use disorder  On admission, UDS and BAL negative. Unclear how much patient has been drinking PTA when he states he has \"4-5 drinks of vodka\" per day, but patient was overall well appearing with no sign of withdrawal symptoms on exam. Continues to be well appearing without signs of withdrawal, and scoring on CIWA mostly attributed to anxiety, so discontinued CIWA protocol on 2/14.   Per PDMP, patient has been on suboxone in the past with last refill for 15 day supply on 12/30/24. Previous dose was 8 mg BID. Psych addressed this with him and patient did not want to restart suboxone.   Unknown if patient has had recent methamphetamine use, but UDS is negative for amphetamines.    - Chemical dependency consult   - Patient wishes to go to IP ESCOBAR treatment   - [facility] be [facility]  - Folic acid 1 mg daily  - PO thiamine     # Suicidal ideation, resolved  # MDD  Endorsed SI with plan and intent on admission; DEC  saw patient in ED and recommended inpatient psychiatric admission for mood stabilization. Patient has not been taking any medications for several months. 2/14 patient reports no SI, SIB, AVH. Psych assessed and no longer recommend IP psych admission. Patient stated that if " "he were to be discharged prior to admission to IP ESCOBAR treatment, he would kill himself. Does not have current active SI, but endorses a plan and intent if he were to be discharged.  - Psych consulted, appreciate recommendations  - Discontinued 1:1 sitter/ q 15 min checks  - Removed from IP psych wait list. Recommend ESCOBAR treatment  - Effexor 37.5 mg daily  - Suicide precautions    # Hypertension, improving  BP has been 170-180/100-130 since arrival to the ED. Has known history of HTN and white coat hypertension with blood pressures much higher in healthcare settings than at home, per chart review of PCP's note. Has not taken PTA Losartan in several months. Patient is well appearing with no signs of organ damage on labs. Pt reports his headache has improved, and denies new symptoms including CP/vision changes/dyspnea. For asymptomatic hypertension, no need to treat pressures with IV PRN medications, but will work on optimizing outpatient hypertension regimen.  - Losartan 50 mg daily  - Tylenol 650 mg q4h PRN     # Tobacco dependence  - nicotine lozenge q1h PRN    # Guardianship status  Psych and SW teams clarified current guardianship status: patient is currently his own guardian. His mother is no longer his guardian. Patient is legally able to make his own healthcare decisions.          Diet: Combination Diet Regular Diet Adult    DVT Prophylaxis: Low Risk/Ambulatory with no VTE prophylaxis indicated  Ross Catheter: Not present  Fluids: PO  Lines: None     Cardiac Monitoring: None  Code Status: Full Code      Clinically Significant Risk Factors                   # Hypertension: Noted on problem list            # Overweight: Estimated body mass index is 29.47 kg/m  as calculated from the following:    Height as of this encounter: 1.651 m (5' 5\").    Weight as of this encounter: 80.3 kg (177 lb 1.6 oz)., PRESENT ON ADMISSION       # Financial/Environmental Concerns: none  # Support System: poor social support noted in " nursing assessment         Social Drivers of Health   Food Insecurity: Patient Declined (2/10/2025)    Received from [facility]    Hunger Vital Sign     Worried About Running Out of Food in the Last Year: Patient declined     Ran Out of Food in the Last Year: Patient declined   Housing Stability: High Risk (2/10/2025)    Received from [facility]    Housing Stability     What is your living situation today?: I do not have a steady place to live (I am temporarily staying with others, in a hotel, in a murray...   Tobacco Use: Low Risk  (2/10/2025)    Received from [facility]    Patient History     Smoking Tobacco Use: Never     Smokeless Tobacco Use: Never   Recent Concern: Tobacco Use - High Risk (12/30/2024)    Patient History     Smoking Tobacco Use: Some Days     Smokeless Tobacco Use: Never   Financial Resource Strain: High Risk (2/22/2023)    Received from [facility]    Overall Financial Resource Strain (CARDIA)     How hard is it for you to pay for the very basics like food, housing, medical care, and heating?: Very hard   Alcohol Use: Alcohol Misuse (9/27/2023)    AUDIT-C     Frequency of Alcohol Consumption: 2-4 times a month     Average Number of Drinks: 3 or 4     Frequency of Binge Drinking: Monthly   Transportation Needs: Unmet Transportation Needs (2/10/2025)    Received from [facility]    PRAPARE - Transportation     Lack of Transportation (Medical): Yes     Lack of Transportation (Non-Medical): Patient declined   Interpersonal Safety: Unknown (2/15/2025)    Interpersonal Safety     Do you feel physically and emotionally safe where you currently live?: Patient declined     Within the past 12 months, have you been hit, slapped, kicked or otherwise physically hurt by someone?: Patient declined     Within the past 12 months, have you been humiliated or emotionally abused in other ways by your partner or ex-partner?: Patient declined   Stress: Stress Concern Present (2/22/2023)     Received from [facility]    [place] [place] [employer] - Occupational Stress Questionnaire     Do you feel stress - tense, restless, nervous, or anxious, or unable to sleep at night because your mind is troubled all the time - these days?: Very much   Social Connections: Socially Isolated (2/22/2023)    Received from [facility]    Social Connection and Isolation Panel [NHANES]     Frequency of Communication with Friends and Family: Once a week     Frequency of Social Gatherings with Friends and Family: Never     Attends Taoist Services: Never     Active Member of Clubs or Organizations: No     Attends Club or Organization Meetings: Never     Marital Status: Never          Disposition Plan        The patient's care was discussed with the Attending Physician, Dr. Obregon .    Bro Brooke MD  [facility]  [facility]  Securely message with [employer] (more info)  Text page via [employer] Paging/Directory   See signed in provider for up to date coverage information  ______________________________________________________________________    Interval History   No overnight events. Patient has no new concerns today, and is agreeable to increasing his dose of Losartan. He is looking forward to going to [facility] tomorrow.    Physical Exam   Vital Signs:                    Weight: 177 lbs 1.6 oz  GENERAL: alert and no distress  HEENT: Eyes grossly normal to inspection, conjunctivae normal,  sclerae normal, external ears normal, and nose normal  RESP: no increased work of breathing, speaking in full sentences  MS: no gross musculoskeletal defects noted  NEURO: alert, moving extremities appropriately  PSYCH: mentation appears normal, affect normal/bright     Medical Decision Making   Please see A&P for additional details of medical decision making.      Data

## 2025-02-16 NOTE — DISCHARGE SUMMARY
"[facility]  Discharge Summary - Medicine & Pediatrics       Date of Admission:  2/12/2025  Date of Discharge:  2/17/2025  Discharging Provider: Dr. Cara Obregon  Discharge Service: hospitals Family Medicine Service    Discharge Diagnoses   Polysubstance use disorder  Alcohol use disorder  Opioid use disorder  MDD  Suicidal ideation, now resolved  Hypertension  Tobacco dependence    Clinically Significant Risk Factors     # Overweight: Estimated body mass index is 29.47 kg/m  as calculated from the following:    Height as of this encounter: 1.651 m (5' 5\").    Weight as of this encounter: 80.3 kg (177 lb 1.6 oz).       Follow-ups Needed After Discharge   Follow-up Appointments       ADULT [facility] Specialty Follow-up and recommended labs and tests      Follow up: Follow up with your primary care provider once you are completed with inpatient ESCOBAR treatment. You will need lab work (BMP) monitoring while increasing your blood pressure medication.     Appointments on [place] and/or [facility] (with [facility] or [facility] provider or service). Call [phone] if you haven't heard regarding these appointments within 7 days of discharge.              Discharge Disposition   Discharged to rehabilitation facility ([facility] treatment)  Condition at discharge: Stable    Hospital Course   Cam Breen is a 44 year old NB adult with history of polysubstance use (alcohol, methamphetamine, opioids), HTN, MDD with SI, and anxiety who was admitted on 2/12/2025 for worsened SI with initial recommendation for inpatient psychiatric hospitalization. Admitted to medicine for management of his hypertension.     The following problems were addressed during Cam's hospitalization:    Cam presented to the ED on 2/12 for ongoing substance use and suicidal ideation. He reported increased SI over the past couple of weeks, and explained that his substance use made suicidal ideation " "worse. He endorsed daily substance use with 4-5 \"drinks\" of alcohol (last use 2/11) and methamphetamine with last use on 2/10.  However, substance use is overall unclear due to limited information given by patient and BAL and UDS were all negative on admission.  Underwent DEC assessment while in ED, and was recommended for inpatient psychiatry admission.    Patient was then admitted to medicine due to concerns of significant hypertension with systolics above 200, with plan to transfer to psych once BP stabilized. However, patient was asymptomatic with normal labs and required no treatment with IV medications to lower his blood pressure. He was restarted on his PTA losartan 25 mg as he had not taken this for several months, titrated up to 50 mg daily. Monitored for alcohol withdrawal with CIWA x 24 hours, but patient did not demonstrate any signs/symptoms of alcohol withdrawal.  Psychiatry was consulted and the next day after admission, psychiatry no longer recommended inpatient psych admission as patient no longer was endorsing SI. Psychiatry recommended restarting patient's PTA Effexor back at 37.5 mg as he had stated he was not taking any psych medications for several months prior to admission. Patient declined to restart suboxone for OUD. Patient then requested to meet with chemical dependency to go to an inpatient ESCOBAR treatment facility. Chem Dep met with him and sent referrals to programs, and patient stated that he would kill himself if discharged from the hospital and not directly to ESCOBAR treatment.   He only endorsed SI after discussion of discharge, and did not exhibit any suicidal ideation, gestures, or self injurious behavior on the medical unit. The SI he reported developed only after plan for discharge was shared, indicative of secondary gain to stay admitted. That same day, patient was accepted to [facility] and was kept inpatient until 2/17 when he could transfer, however on further evaluation, " [facility] did not feel patient was appropriate for placement at their facility due to behavioral concerns, and he was discharged to a shelter with resources provided by care coordination/social work. Patient was not agreeable to forming a plan for outpatient follow up regarding substance use, hypertension and mental health.    For hypertension: discharged with one month supply of Losartan 50 mg. Recommended follow up with PCP for continued BP management    For mental health: recommended follow up with PTA mental health provider, sent with one month supply of Effexor 37.5 mg    For ESCOBAR: given information by care coordination for direct access ESCOBAR programs    Consultations This Hospital Stay   DIAGNOSTIC EVALUATION CENTER (DEC) ASSESSMENT ORDER  DIAGNOSTIC EVALUATION CENTER (DEC) ASSESSMENT ORDER  PSYCHIATRY IP CONSULT  CHEMICAL DEPENDENCY IP CONSULT  CARE MANAGEMENT / SOCIAL WORK IP CONSULT    Code Status   Full Code       The patient was discussed with Dr. Obregon.    Bro Brooke MD  [facility]  [facility] [address]  [address] [place] [id number]  Phone: [phone]  Fax: [phone]  ______________________________________________________________________    Physical Exam   Vital Signs:   Temp src: Oral BP: (!) 167/126 Pulse: 85   Resp: 16 SpO2: 100 % O2 Device: None (Room air)    Weight: 177 lbs 1.6 oz  GENERAL: alert and no distress  HEENT: Eyes grossly normal to inspection, conjunctivae normal,  sclerae normal, external ears normal, and nose normal  RESP: no increased work of breathing, speaking in full sentences  MS: no gross musculoskeletal defects noted  NEURO: alert, moving extremities appropriately  PSYCH: Flat affect, uncooperative attitude. Very hostile towards healthcare workers and does not wish to engage in conversation. Speech is normal, not slowed/tangential/pressured. Does not endorse current SI or HI.      Primary Care Physician   Radha Zeng    Discharge  Orders      Reason for your hospital stay    Suicidal ideation and alcohol withdrawal     Activity    Your activity upon discharge: activity as tolerated     ADULT [facility] Specialty Follow-up and recommended labs and tests    Follow up: Follow up with your primary care provider once you are completed with inpatient ESCOBAR treatment. You will need lab work (BMP) monitoring while increasing your blood pressure medication.     Appointments on [place] and/or [facility] (with [facility] or [facility] provider or service). Call [phone] if you haven't heard regarding these appointments within 7 days of discharge.     Diet    Follow this diet upon discharge: Regular Diet       Significant Results and Procedures   Most Recent 3 CBC's:  Recent Labs   Lab Test 02/13/25  0728 09/27/23  0212 06/19/22 2005   WBC 7.1 10.2 11.2*   HGB 13.8 12.0 12.2   MCV 83 75* 81    239 280     Most Recent 3 BMP's:  Recent Labs   Lab Test 02/13/25  0729 09/27/23  0212 06/19/22 2005    135 139   POTASSIUM 4.0 3.7 3.6   CHLORIDE 110* 101 108   CO2 25 24 27   BUN 14.2 9.3 8   CR 0.98 0.95 0.86   ANIONGAP 8 10 4   YFN 9.0 9.2 9.0   GLC 91 101* 119*   ,   Results for orders placed or performed during the hospital encounter of 06/19/22   Abdomen US, limited (RUQ only)    Narrative    EXAM: US ABDOMEN LIMITED  LOCATION: [facility]  DATE/TIME: 6/19/2022 8:42 PM    INDICATION: upper abd pain x1 day  COMPARISON: None.  TECHNIQUE: Limited abdominal ultrasound.    FINDINGS:    GALLBLADDER: Cholelithiasis, several of which are nonmobile in the gallbladder neck. There is gallbladder wall edema and positive sonographic Sullivan's sign. No definite pericholecystic fluid.    BILE DUCTS: No biliary dilatation. The common duct measures 4 mm.    LIVER: Hepatomegaly with possible steatosis. No focal mass.    RIGHT KIDNEY: No hydronephrosis.    PANCREAS: The visualized portions are normal.    No ascites.      Impression     IMPRESSION:  1.  Findings suspicious for acute cholecystitis.  2.  Hepatomegaly with possible steatosis.           Discharge Medications   Current Discharge Medication List        CONTINUE these medications which have CHANGED    Details   losartan (COZAAR) 50 MG tablet Take 1 tablet (50 mg) by mouth daily.  Qty: 30 tablet, Refills: 0    Associated Diagnoses: Primary hypertension      nicotine (NICORETTE) 4 MG lozenge Place 1 lozenge (4 mg) inside cheek every hour as needed for nicotine withdrawal symptoms.  Qty: 108 lozenge, Refills: 0    Associated Diagnoses: Cigarette nicotine dependence without complication      venlafaxine (EFFEXOR XR) 37.5 MG 24 hr capsule Take 1 capsule (37.5 mg) by mouth daily.  Qty: 30 capsule, Refills: 0    Associated Diagnoses: Moderate episode of recurrent major depressive disorder (H); Anxiety           CONTINUE these medications which have NOT CHANGED    Details   EPINEPHrine (ANY BX GENERIC EQUIV) 0.3 MG/0.3ML injection 2-pack Inject 0.3 mg into the muscle as needed.      propranolol (INDERAL) 20 MG tablet Take 1 tablet by mouth 2 times daily. Not taking  Not taking      FEROSUL 325 (65 Fe) MG tablet Take 325 mg by mouth every other day. Not taking      medroxyPROGESTERone (DEPO-PROVERA) 150 MG/ML IM injection Inject 150 mg into the muscle every 3 months.      naloxone (NARCAN) 4 MG/0.1ML nasal spray Spray 4 mg into one nostril alternating nostrils once as needed for opioid reversal.           Allergies   Allergies   Allergen Reactions    Bee Venom Anaphylaxis     16 year's ago anaphylaxis went to hospital,  Did have positive venom skin testing    Wasp Venom Protein Anaphylaxis    Sulfa Antibiotics Rash

## 2025-02-16 NOTE — PROGRESS NOTES
End of Shift Summary [id number]    Pt refused all assessments, medication passes, vital signs. Declined to answer orientation questions, refused cares. No acute concerns, safety checks completed. Pt in room and ambulating hallway throughout shift. Plan is to discharge to [facility] 2/17. Continue POC.    Елена Mullen RN

## 2025-02-17 ENCOUNTER — TELEPHONE (OUTPATIENT)
Dept: BEHAVIORAL HEALTH | Facility: CLINIC | Age: 45
End: 2025-02-17
Payer: COMMERCIAL

## 2025-02-17 VITALS
SYSTOLIC BLOOD PRESSURE: 167 MMHG | WEIGHT: 177.1 LBS | RESPIRATION RATE: 16 BRPM | HEART RATE: 85 BPM | OXYGEN SATURATION: 100 % | BODY MASS INDEX: 29.51 KG/M2 | DIASTOLIC BLOOD PRESSURE: 126 MMHG | HEIGHT: 65 IN | TEMPERATURE: 99.2 F

## 2025-02-17 PROCEDURE — 99238 HOSP IP/OBS DSCHRG MGMT 30/<: CPT | Mod: GC

## 2025-02-17 ASSESSMENT — ACTIVITIES OF DAILY LIVING (ADL)
ADLS_ACUITY_SCORE: 26
ADLS_ACUITY_SCORE: 25
ADLS_ACUITY_SCORE: 25
ADLS_ACUITY_SCORE: 26
ADLS_ACUITY_SCORE: 25
ADLS_ACUITY_SCORE: 25
DEPENDENT_IADLS:: TRANSPORTATION
ADLS_ACUITY_SCORE: 25
ADLS_ACUITY_SCORE: 26
ADLS_ACUITY_SCORE: 25
ADLS_ACUITY_SCORE: 26
ADLS_ACUITY_SCORE: 25
ADLS_ACUITY_SCORE: 26
ADLS_ACUITY_SCORE: 25
ADLS_ACUITY_SCORE: 26

## 2025-02-17 NOTE — PLAN OF CARE
Goal Outcome Evaluation:    Shift [id number]     Pt is alert and orientated x4, independent in room, denied CP, SOB, and N/V. Remained asleep throughout most of night. No acute changes overnight. Call light within reach, able to make needs known. Plan for discharge to [facility] today, continue with plan of care

## 2025-02-17 NOTE — TELEPHONE ENCOUNTER
----- Message from Derek Sánchez sent at 2/17/2025 11:27 AM CST -----  Regarding: cancel admission  Please cancel today's admission for this pt.     Thanks,  Pepe

## 2025-02-17 NOTE — TELEPHONE ENCOUNTER
Writer reviewed pt psych history: she came into the ED testing negative for all substances and was later deemed appropriate for LP due to Psych note from Dr. Yenny Horan stating the mental health symptoms have decreased as pt became sober. Pt was prescribed Abilify and Suboxone in December 2024, when the commitment ended in December 2024 this prescription ended as did the guardianship pt was under. Pt has not been positive for any substances for previous hospital visits other than amphetamines for which pt was prescribed Concerta at the time.     Pt affect was significantly different than the visit on 2/14/25 as pt was cooperative and pleasant on 2/14 and pt today 2/17 affect was flat. Pt appears to be struggling with MH concerns. Pt is refusing medications on the medical unit and is not appropriate for LP. Recommend a MI program for pt.

## 2025-02-17 NOTE — TELEPHONE ENCOUNTER
----- Message from Derek Sánchez sent at 2/14/2025  3:46 PM CST -----  Regarding: schedule admission  Scheduling Request    Patient Name: Bria Breen  Location of programming: [facility]  Start Date: February / 17 / 2025  Group:  [facility] on Monday at 10:00 AM   Attending Provider (MD): Geraldine  Duration of Appointment in minutes: 840  Visit Type: In-person or Treatment - 870    Additional notes: 8 M/S Transfer

## 2025-02-17 NOTE — PLAN OF CARE
Goal Outcome Evaluation:      Plan of Care Reviewed With: patient    Overall Patient Progress: improvingOverall Patient Progress: improving     Edelmira Kern, RN    Nurse Coordinator     Covering for: Chris 8MS    Phone: *[phone]    Social Work and Care Management Department    SEARCHABLE in [facility] - search CARE COORDINATOR    [place] & [place] ([phone]) Saturday & Sunday; ([phone]) [place] Recognized Holidays    Units: 5A, 5B & 5C  Pager: [phone]    Units: 6B, 6C & 6D    Pager: [phone]    Units: 7A, 7B, 7C & 7D    Pager: [phone]    Units: 6A & ICU   Pager: [phone]    Units: 5 Ortho, 5MS & WB ED Pager: [phone]    Units: 6MS, 8A & 10 ICU  Pager [phone]

## 2025-02-17 NOTE — PROGRESS NOTES
Patient is alert and oriented x4, independent in room. Denies chest pain, shortness of breath, or nausea/vomiting. Rested throughout shift [id number] with no acute changes. Pt refused 0800 med pass after multiple attempts. Call light within reach and able to communicate needs. Discharge planned for [facility] on 2/17. Continue with the plan of care.

## 2025-02-17 NOTE — DISCHARGE SUMMARY
DISCHARGE SUMMARY    Pt discharging to: home  Transportation: walked out  AVS given and discussed: Yes, no further questions.   Medications given: Yes, discussed. No further questions.   Belongings returned: Yes, ensured all belongings packed and sent with pt. No items in security.   Comments: Escorted safely to elevators.     Abiodun York RN

## 2025-02-17 NOTE — CONSULTS
Care Management Initial Consult    General Information  Assessment completed with: Patient,    Type of CM/SW Visit: Initial Assessment    Primary Care Provider verified and updated as needed: Yes   Readmission within the last 30 days: no previous admission in last 30 days      Reason for Consult: discharge planning  Advance Care Planning: Advance Care Planning Reviewed: no concerns identified          Communication Assessment  Patient's communication style: spoken language (English or Bilingual)    Hearing Difficulty or Deaf: no   Wear Glasses or Blind: no    Cognitive  Cognitive/Neuro/Behavioral: WDL, .WDL except, mood/behavior  Level of Consciousness: alert  Arousal Level: opens eyes spontaneously  Orientation: oriented x 4  Mood/Behavior: anxious, restless  Best Language: 0 - No aphasia  Speech: spontaneous    Living Environment:   People in home: alone     Current living Arrangements: homeless      Able to return to prior arrangements: no       Family/Social Support:  Care provided by: self  Provides care for: no one  Marital Status: Single  Support system: None          Description of Support System:      Support Assessment: Complicated family dynamics, Limited social contact and support, Lacks adequate emotional support, Lacks adequate physical care    Current Resources:   Patient receiving home care services: No        Community Resources: None  Equipment currently used at home: none  Supplies currently used at home: None    Employment/Financial:  Employment Status: unemployed        Financial Concerns: none   Referral to Financial Worker: No       Does the patient's insurance plan have a 3 day qualifying hospital stay waiver?  No    Lifestyle & Psychosocial Needs:  Social Drivers of Health     Food Insecurity: Unknown (2/16/2025)    Food Insecurity     Within the past 12 months, did you worry that your food would run out before you got money to buy more?: Patient declined     Within the past 12 months, did  the food you bought just not last and you didn t have money to get more?: Patient declined   Depression: Not at risk (12/30/2024)    PHQ-2     PHQ-2 Score: 2   Housing Stability: Unknown (2/16/2025)    Housing Stability     Do you have housing? : Patient declined     Are you worried about losing your housing?: Patient declined   Recent Concern: Housing Stability - High Risk (2/10/2025)    Received from [facility]    Housing Stability     What is your living situation today?: I do not have a steady place to live (I am temporarily staying with others, in a hotel, in a murray...   Tobacco Use: Low Risk  (2/10/2025)    Received from [facility]    Patient History     Smoking Tobacco Use: Never     Smokeless Tobacco Use: Never     Passive Exposure: Not on file   Recent Concern: Tobacco Use - High Risk (12/30/2024)    Patient History     Smoking Tobacco Use: Some Days     Smokeless Tobacco Use: Never     Passive Exposure: Not on file   Financial Resource Strain: Unknown (2/16/2025)    Financial Resource Strain     Within the past 12 months, have you or your family members you live with been unable to get utilities (heat, electricity) when it was really needed?: Patient declined   Alcohol Use: Alcohol Misuse (9/27/2023)    AUDIT-C     Frequency of Alcohol Consumption: 2-4 times a month     Average Number of Drinks: 3 or 4     Frequency of Binge Drinking: Monthly   Transportation Needs: Unknown (2/16/2025)    Transportation Needs     Within the past 12 months, has lack of transportation kept you from medical appointments, getting your medicines, non-medical meetings or appointments, work, or from getting things that you need?: Patient declined   Recent Concern: Transportation Needs - Unmet Transportation Needs (2/10/2025)    Received from [facility]    PRAPARE - Transportation     Lack of Transportation (Medical): Yes     Lack of Transportation (Non-Medical): Patient declined   Physical Activity: Sufficiently Active  "(2/22/2023)    Received from [facility]    Exercise Vital Sign     Days of Exercise per Week: 7 days     Minutes of Exercise per Session: 30 min   Interpersonal Safety: Unknown (2/15/2025)    Interpersonal Safety     Do you feel physically and emotionally safe where you currently live?: Patient declined     Within the past 12 months, have you been hit, slapped, kicked or otherwise physically hurt by someone?: Patient declined     Within the past 12 months, have you been humiliated or emotionally abused in other ways by your partner or ex-partner?: Patient declined   Stress: Stress Concern Present (2/22/2023)    Received from [facility]    [place] [employer] - Occupational Stress Questionnaire     Do you feel stress - tense, restless, nervous, or anxious, or unable to sleep at night because your mind is troubled all the time - these days?: Very much   Social Connections: Socially Isolated (2/22/2023)    Received from [facility]    Social Connection and Isolation Panel [NHANES]     Frequency of Communication with Friends and Family: Once a week     Frequency of Social Gatherings with Friends and Family: Never     Attends Congregation Services: Never     Active Member of Clubs or Organizations: No     Attends Club or Organization Meetings: Never     Marital Status: Never    Health Literacy: Not on file       Functional Status:  Prior to admission patient needed assistance:   Dependent ADLs:: Independent  Dependent IADLs:: Transportation       Mental Health Status:          Chemical Dependency Status:                Values/Beliefs:  Spiritual, Cultural Beliefs, Congregation Practices, Values that affect care: no               Discussed  Partnership in Safe Discharge Planning  document with patient/family: Yes:     Additional Information:  Per H&P: \"Cam Breen is a 44 year old adult with history of polysubstance use " "(alcohol, methamphetamine, opioids), HTN, MDD with SI, and anxiety who was admitted on 2/12/2025 for worsened SI with recommendation for inpatient psychiatric hospitalization. Admitted to medicine for management of his hypertension, which is stable. SI now resolved, no longer recommending psych admission, and currently working on transition to IP [facility] treatment.\"    RNCC met with patient to complete initial assessment and introduce RNCC role.  Patient reports that they are homeless.  Patient states that they are independent in all cares.  They state that no use of supplies or other resources are used.       Patient states they have mental health, CD, and financial concerns.  Patient states they don't have a job and no place to live.    RNCC was notified by  that patient no longer qualifies to discharge to [facility].  RNCC notified patient of this and provided them with [facility] information.      Patient wanted RNCC to be present while on the phone with their mom, so she could hear reasoning why patient isn't able to discharge to [facility].      RNCC provided patient with [facility] ride information for discharge.      No further care management intervention anticipated at this time.  Please re-consult if further needs arise.  Care management signing off.       Edelmira Kern, BETHANY    Nurse Coordinator     Covering for: Chris Mejia MS    Phone: *[phone]    Social Work and Care Management Department    SEARCHABLE in [facility] - search CARE COORDINATOR    [place] ([phone]) Saturday & Sunday; ([phone]) [place] Recognized Holidays    Units: 5A, 5B & 5C  Pager: [phone]    Units: 6B, 6C & 6D    Pager: [phone]    Units: 7A, 7B, 7C & 7D    Pager: [phone]    Units: 6A & ICU   Pager: [phone]    Units: 5 Ortho, 5MS & WB ED Pager: [phone]    Units: 6MS, 8A & 10 ICU  Pager [phone]           "

## 2025-03-28 NOTE — PROGRESS NOTES
Problem: Potential for Falls  Goal: Patient will remain free of falls  Description: INTERVENTIONS:- Educate patient/family on patient safety including physical limitations- Instruct patient to call for assistance with activity - Consult OT/PT to assist with strengthening/mobility - Keep Call bell within reach- Keep bed low and locked with side rails adjusted as appropriate- Keep care items and personal belongings within reach- Initiate and maintain comfort rounds- Make Fall Risk Sign visible to staff- Offer Toileting every 2 Hours, in advance of need- Initiate/Maintain bed/chair alarm- Obtain necessary fall risk management equipment- Apply yellow socks and bracelet for high fall risk patients- Consider moving patient to room near nurses station  INTERVENTIONS:- Educate patient/family on patient safety including physical limitations- Instruct patient to call for assistance with activity - Consult OT/PT to assist with strengthening/mobility - Keep Call bell within reach- Keep bed low and locked with side rails adjusted as appropriate- Keep care items and personal belongings within reach- Initiate and maintain comfort rounds- Make Fall Risk Sign visible to staff- Offer Toileting every 2 Hours, in advance of need- Initiate/Maintain bed/chair alarm- Obtain necessary fall risk management equipment- Apply yellow socks and bracelet for high fall risk patients- Consider moving patient to room near nurses station  Outcome: Adequate for Discharge     Problem: PAIN - ADULT  Goal: Verbalizes/displays adequate comfort level or baseline comfort level  Description: Interventions:- Encourage patient to monitor pain and request assistance- Assess pain using appropriate pain scale- Administer analgesics based on type and severity of pain and evaluate response- Implement non-pharmacological measures as appropriate and evaluate response- Consider cultural and social influences on pain and pain management- Notify physician/advanced  LM with guardian that pt would be returning back to [place] today.  Left our call back number.    SAEID GilbertSW    practitioner if interventions unsuccessful or patient reports new pain  Outcome: Adequate for Discharge     Problem: INFECTION - ADULT  Goal: Absence or prevention of progression during hospitalization  Description: INTERVENTIONS:- Assess and monitor for signs and symptoms of infection- Monitor lab/diagnostic results- Monitor all insertion sites, i.e. indwelling lines, tubes, and drains- Monitor endotracheal if appropriate and nasal secretions for changes in amount and color- [place] appropriate cooling/warming therapies per order- Administer medications as ordered- Instruct and encourage patient and family to use good hand hygiene technique- Identify and instruct in appropriate isolation precautions for identified infection/condition  Outcome: Adequate for Discharge  Goal: Absence of fever/infection during neutropenic period  Description: INTERVENTIONS:- Monitor WBC  Outcome: Adequate for Discharge     Problem: SAFETY ADULT  Goal: Patient will remain free of falls  Description: INTERVENTIONS:- Educate patient/family on patient safety including physical limitations- Instruct patient to call for assistance with activity - Consult OT/PT to assist with strengthening/mobility - Keep Call bell within reach- Keep bed low and locked with side rails adjusted as appropriate- Keep care items and personal belongings within reach- Initiate and maintain comfort rounds- Make Fall Risk Sign visible to staff- Offer Toileting every 2 Hours, in advance of need- Initiate/Maintain bed/chair alarm- Obtain necessary fall risk management equipment- Apply yellow socks and bracelet for high fall risk patients- Consider moving patient to room near nurses station  INTERVENTIONS:- Educate patient/family on patient safety including physical limitations- Instruct patient to call for assistance with activity - Consult OT/PT to assist with strengthening/mobility - Keep Call bell within reach- Keep bed low and locked with side rails adjusted as  appropriate- Keep care items and personal belongings within reach- Initiate and maintain comfort rounds- Make Fall Risk Sign visible to staff-Outcome: Adequate for Discharge  Goal: Maintain or return to baseline ADL function  Description: INTERVENTIONS:-  Assess patient's ability to carry out ADLs; assess patient's baseline for ADL function and identify physical deficits which impact ability to perform ADLs (bathing, care of mouth/teeth, toileting, grooming, dressing, etc.)- Assess/evaluate cause of self-care deficits - Assess range of motion- Assess patient's mobility; develop plan if impaired- Assess patient's need for assistive devices and provide as appropriate- Encourage maximum independence but intervene and supervise when necessary- Involve family in performance of ADLs- Assess for home care needs following discharge - Consider OT consult to assist with ADL evaluation and planning for discharge- Provide patient education as appropriate  Outcome: Adequate for Discharge  Goal: Maintains/Returns to pre admission functional level  Description: INTERVENTIONS:- Perform AM-PAC 6 Click Basic Mobility/ Daily Activity assessment daily.- Set and communicate daily mobility goal to care team and patient/family/caregiver. - Collaborate with rehabilitation services on mobility goals if consulted-Ambulate patient 3 times a day- Out of bed to chair 3 times a day - Out of bed for meals 3 times a day- Out of bed for toileting- Record patient progress and toleration of activity level   Outcome: Adequate for Discharge     Problem: DISCHARGE PLANNING  Goal: Discharge to home or other facility with appropriate resources  Description: INTERVENTIONS:- Identify barriers to discharge w/patient and caregiver- Arrange for needed discharge resources and transportation as appropriate- Identify discharge learning needs (meds, wound care, etc.)- Arrange for interpretive services to assist at discharge as needed- Refer to Case Management  Department for coordinating discharge planning if the patient needs post-hospital services based on physician/advanced practitioner order or complex needs related to functional status, cognitive ability, or social support system  Outcome: Adequate for Discharge     Problem: Knowledge Deficit  Goal: Patient/family/caregiver demonstrates understanding of disease process, treatment plan, medications, and discharge instructions  Description: Complete learning assessment and assess knowledge base.Interventions:- Provide teaching at level of understanding- Provide teaching via preferred learning methods  Outcome: Adequate for Discharge     Problem: NEUROSENSORY - ADULT  Goal: Achieves stable or improved neurological status  Description: INTERVENTIONS- Monitor and report changes in neurological status- Monitor vital signs such as temperature, blood pressure, glucose, and any other labs ordered - Initiate measures to prevent increased intracranial pressure- Monitor for seizure activity and implement precautions if appropriate    Outcome: Adequate for Discharge  Goal: Remains free of injury related to seizures activity  Description: INTERVENTIONS- Maintain airway, patient safety  and administer oxygen as ordered- Monitor patient for seizure activity, document and report duration and description of seizure to physician/advanced practitioner- If seizure occurs,  ensure patient safety during seizure- Reorient patient post seizure- Seizure pads on all 4 side rails- Instruct patient/family to notify RN of any seizure activity including if an aura is experienced- Instruct patient/family to call for assistance with activity based on nursing assessment- Administer anti-seizure medications if ordered  Outcome: Adequate for Discharge  Goal: Achieves maximal functionality and self care  Description: INTERVENTIONS- Monitor swallowing and airway patency with patient fatigue and changes in neurological status- Encourage and assist patient  to increase activity and self care. - Encourage visually impaired, hearing impaired and aphasic patients to use assistive/communication devices  Outcome: Adequate for Discharge     Problem: GENITOURINARY - ADULT  Goal: Maintains or returns to baseline urinary function  Description: INTERVENTIONS:- Assess urinary function- Encourage oral fluids to ensure adequate hydration if ordered- Administer IV fluids as ordered to ensure adequate hydration- Administer ordered medications as needed- Offer frequent toileting- Follow urinary retention protocol if ordered  Outcome: Adequate for Discharge  Goal: Absence of urinary retention  Description: INTERVENTIONS:- Assess patient’s ability to void and empty bladder- Monitor I/O- Bladder scan as needed- Discuss with physician/AP medications to alleviate retention as needed- Discuss catheterization for long term situations as appropriate  Outcome: Adequate for Discharge     Problem: SKIN/TISSUE INTEGRITY - ADULT  Goal: Skin Integrity remains intact(Skin Breakdown Prevention)  Description: Assess:-Perform Misael assessment every day-Clean and moisturize skin every day-Inspect skin when repositioning, toileting, and assisting with ADLS-Assess under medical devices -Assess extremities for adequate circulation and sensation Bed Management:-Have minimal linens on bed & keep smooth, unwrinkled-Change linens as needed when moist or perspiring-Avoid sitting or lying in one position for more than 2 hours while in bed-Keep HOB at 30 degrees Toileting:-Offer bedside commode-Activity:-Mobilize patient 3 times a day-Encourage activity and walks on unit-Encourage or provide ROM exercises -Turn and reposition patient every 2 Hours-Use appropriate equipment to lift or move patient in bed-Instruct/ Assist with weight shifting every hour when out of bed in chair-Goal: Incision(s), wounds(s) or drain site(s) healing without S/S of infection  Description: INTERVENTIONS- Assess and document dressing,  incision, wound bed, drain sites and surrounding tissue- Provide patient and family education- Outcome: Adequate for Discharge  Goal: Pressure injury heals and does not worsen  Description: Interventions:- Implement low air loss mattress or specialty surface (Criteria met)-Outcome: Adequate for Discharge     Problem: MUSCULOSKELETAL - ADULT  Goal: Maintain or return mobility to safest level of function  Description: INTERVENTIONS:- Assess patient's ability to carry out ADLs; assess patient's baseline for ADL function and identify physical deficits which impact ability to perform ADLs (bathing, care of mouth/teeth, toileting, grooming, dressing, etc.)- Assess/evaluate cause of self-care deficits - Assess range of motion- Assess patient's mobility- Assess patient's need for assistive devices and provide as appropriate- Encourage maximum independence but intervene and supervise when necessary- Involve family in performance of ADLs- Assess for home care needs following discharge - Consider OT consult to assist with ADL evaluation and planning for discharge- Provide patient education as appropriate  Outcome: Adequate for Discharge  Goal: Maintain proper alignment of affected body part  Description: INTERVENTIONS:- Support, maintain and protect limb and body alignment- Provide patient/ family with appropriate education  Outcome: Adequate for Discharge

## 2025-04-10 ENCOUNTER — HOSPITAL ENCOUNTER (OUTPATIENT)
Facility: CLINIC | Age: 45
Setting detail: OBSERVATION
End: 2025-04-10
Attending: STUDENT IN AN ORGANIZED HEALTH CARE EDUCATION/TRAINING PROGRAM
Payer: COMMERCIAL

## 2025-04-10 VITALS
TEMPERATURE: 97.9 F | HEART RATE: 70 BPM | DIASTOLIC BLOOD PRESSURE: 101 MMHG | BODY MASS INDEX: 28.06 KG/M2 | HEIGHT: 65 IN | WEIGHT: 168.4 LBS | OXYGEN SATURATION: 98 % | RESPIRATION RATE: 16 BRPM | SYSTOLIC BLOOD PRESSURE: 150 MMHG

## 2025-04-10 DIAGNOSIS — F20.9 SCHIZOPHRENIA, UNSPECIFIED TYPE (H): ICD-10-CM

## 2025-04-10 DIAGNOSIS — I10 PRIMARY HYPERTENSION: ICD-10-CM

## 2025-04-10 DIAGNOSIS — Z59.00 HOMELESSNESS: ICD-10-CM

## 2025-04-10 DIAGNOSIS — F15.20 METHAMPHETAMINE USE DISORDER, MODERATE (H): ICD-10-CM

## 2025-04-10 DIAGNOSIS — R44.0 AUDITORY HALLUCINATIONS: ICD-10-CM

## 2025-04-10 DIAGNOSIS — R03.0 ELEVATED BLOOD PRESSURE READING: ICD-10-CM

## 2025-04-10 DIAGNOSIS — F60.3 BORDERLINE PERSONALITY DISORDER (H): ICD-10-CM

## 2025-04-10 DIAGNOSIS — F29 PSYCHOSIS, UNSPECIFIED PSYCHOSIS TYPE (H): ICD-10-CM

## 2025-04-10 PROBLEM — Z59.02 UNSHELTERED HOMELESSNESS: Chronic | Status: ACTIVE | Noted: 2025-02-10

## 2025-04-10 PROBLEM — Z76.5 MALINGERING: Status: ACTIVE | Noted: 2025-02-10

## 2025-04-10 PROCEDURE — 80307 DRUG TEST PRSMV CHEM ANLYZR: CPT | Performed by: PSYCHIATRY & NEUROLOGY

## 2025-04-10 PROCEDURE — 99285 EMERGENCY DEPT VISIT HI MDM: CPT

## 2025-04-10 PROCEDURE — 250N000013 HC RX MED GY IP 250 OP 250 PS 637: Performed by: PSYCHIATRY & NEUROLOGY

## 2025-04-10 RX ORDER — OLANZAPINE 10 MG/1
10 TABLET, ORALLY DISINTEGRATING ORAL EVERY 6 HOURS PRN
Status: DISCONTINUED | OUTPATIENT
Start: 2025-04-10 | End: 2025-04-11

## 2025-04-10 RX ORDER — ACETAMINOPHEN 500 MG
500 TABLET ORAL EVERY 6 HOURS PRN
Status: DISCONTINUED | OUTPATIENT
Start: 2025-04-10 | End: 2025-04-11

## 2025-04-10 RX ORDER — NICOTINE 21 MG/24HR
1 PATCH, TRANSDERMAL 24 HOURS TRANSDERMAL DAILY
Status: DISCONTINUED | OUTPATIENT
Start: 2025-04-10 | End: 2025-04-12 | Stop reason: HOSPADM

## 2025-04-10 RX ORDER — HYDROXYZINE HYDROCHLORIDE 50 MG/1
50 TABLET, FILM COATED ORAL 3 TIMES DAILY PRN
Status: DISCONTINUED | OUTPATIENT
Start: 2025-04-10 | End: 2025-04-11

## 2025-04-10 RX ORDER — OLANZAPINE 10 MG/2ML
10 INJECTION, POWDER, FOR SOLUTION INTRAMUSCULAR DAILY PRN
Status: DISCONTINUED | OUTPATIENT
Start: 2025-04-10 | End: 2025-04-12 | Stop reason: HOSPADM

## 2025-04-10 RX ADMIN — NICOTINE POLACRILEX 2 MG: 2 GUM, CHEWING BUCCAL at 09:53

## 2025-04-10 RX ADMIN — OLANZAPINE 10 MG: 10 TABLET, ORALLY DISINTEGRATING ORAL at 10:47

## 2025-04-10 RX ADMIN — NICOTINE 1 PATCH: 21 PATCH, EXTENDED RELEASE TRANSDERMAL at 09:51

## 2025-04-10 RX ADMIN — OLANZAPINE 10 MG: 10 TABLET, ORALLY DISINTEGRATING ORAL at 20:32

## 2025-04-10 SDOH — ECONOMIC STABILITY - HOUSING INSECURITY: HOMELESSNESS UNSPECIFIED: Z59.00

## 2025-04-10 ASSESSMENT — ACTIVITIES OF DAILY LIVING (ADL)
ADLS_ACUITY_SCORE: 51

## 2025-04-10 ASSESSMENT — COLUMBIA-SUICIDE SEVERITY RATING SCALE - C-SSRS
4. HAVE YOU HAD THESE THOUGHTS AND HAD SOME INTENTION OF ACTING ON THEM?: NO
3. HAVE YOU BEEN THINKING ABOUT HOW YOU MIGHT KILL YOURSELF?: NO
2. HAVE YOU ACTUALLY HAD ANY THOUGHTS OF KILLING YOURSELF IN THE PAST MONTH?: YES
5. HAVE YOU STARTED TO WORK OUT OR WORKED OUT THE DETAILS OF HOW TO KILL YOURSELF? DO YOU INTEND TO CARRY OUT THIS PLAN?: NO
1. IN THE PAST MONTH, HAVE YOU WISHED YOU WERE DEAD OR WISHED YOU COULD GO TO SLEEP AND NOT WAKE UP?: YES
6. HAVE YOU EVER DONE ANYTHING, STARTED TO DO ANYTHING, OR PREPARED TO DO ANYTHING TO END YOUR LIFE?: YES

## 2025-04-10 NOTE — ED TRIAGE NOTES
Pt arrives via triage presenting for EMPATH. Pt states she has been hearing voices. Passive SI, no plan    States she is supposed to take blood pressure medication but has not in a while.      Triage Assessment (Adult)       Row Name 04/10/25 0814          Triage Assessment    Airway WDL WDL        Respiratory WDL    Respiratory WDL WDL        Skin Circulation/Temperature WDL    Skin Circulation/Temperature WDL WDL        Cardiac WDL    Cardiac WDL WDL        Peripheral/Neurovascular WDL    Peripheral Neurovascular WDL WDL        Cognitive/Neuro/Behavioral WDL    Cognitive/Neuro/Behavioral WDL WDL

## 2025-04-10 NOTE — ED PROVIDER NOTES
Emergency Department Note      History of Present Illness     Chief Complaint   Hallucinations (/)      HPI   Bria Breen is a pleasant 44 year old adult presenting with hallucinations. The patient has a history of schizophrenia and reports that they have been experiencing increased auditory hallucinations and anxiety for the last couple months. They have not taken any daily medications in the last 6 months. Their symptoms improved slightly after their psychiatric admission at [facility] in March, but worsened again soon after. The patient is currently living in a hotel. They last used methamphetamine 6 months ago, and their last alcoholic drink was a few days ago. They deny any other substance use. No suicidal ideation, homicidal ideation, or other pertinent medical concerns at this time.     Independent Historian   None    Review of External Notes   I personally reviewed notes from the patient's discharge summary dated  3/3/25 . This provided me with information regarding patient's recent hospitalization.     Past Medical History     Medical History and Problem List   Acute cystitis  Anxiety  Depressive disorder  Intravenous drug use  Hypokalemia  Other viral warts  Migraine    Medications   The patient is currently on no regular medications.     Physical Exam     Patient Vitals for the past 24 hrs:   BP Temp Pulse Resp SpO2   04/10/25 0816 (!) 170/125 98  F (36.7  C) 83 16 98 %     Physical Exam  General: Female appearing stated age, seated in chair, alert and conversant  HENT: Atraumatic, normocephalic  Eyes: Extraocular movements intact  Cardiovascular: Regular rate  Pulmonary: Easy work of breathing  Skin: Warm and dry  Neuro: Alert and oriented  Psych: Cooperative, appropriate affect    Diagnostics     Lab Results   Labs Ordered and Resulted from Time of ED Arrival to Time of ED Departure - No data to display    Imaging   No orders to display     EKG   No ECG performed.     Independent Interpretation    None    ED Course      Medications Administered   Medications - No data to display    Procedures   Procedures   None performed    Discussion of Management   None    ED Course   ED Course as of 04/10/25 0849   Thu Apr 10, 2025   0830 I obtained history and examined the patient as noted above. I explained findings to the patient and we discussed plan for transfer to [facility]. The patient is comfortable with this plan.      Additional Documentation  None    Medical Decision Making / Diagnosis     CMS Diagnoses: None    MIPS       None    MDM   Patient presenting with  auditory hallucinations .  Vital signs are stable.   The patient has no acute medical concerns.  They do not appear intoxicated and deny recent ingestions.   They do not have a specific plan for suicide at this time.  I feel the patient is medically clear for mental health assessment.   They are appropriate for transfer to [facility] unit for further evaluation and management.    Disposition   [facility]     Diagnosis     ICD-10-CM    1. Auditory hallucinations  R44.0       2. Borderline personality disorder (H)  F60.3       3. Schizophrenia, unspecified type (H)  F20.9       4. Elevated blood pressure reading  R03.0       5. Methamphetamine use disorder, moderate (H)  F15.20       6. Homelessness  Z59.00          Scribe Disclosure:  I, Sari Elainesandra, am serving as a scribe at 8:41 AM on 4/10/2025 to document services personally performed by Parmjit Camp MD based on my observations and the provider's statements to me.      Parmjit Camp MD  04/10/25 [id number]

## 2025-04-10 NOTE — ED NOTES
[facility]  ED to E[facility] Checklist:      Goal for EMPATH: Hallucinations    Current Behavior: Calm    Safety Concerns: Suicidal, no plan    Legal Hold Status: Voluntary    Medically Cleared by ED provider: Yes    Patient Therapeutically Searched: Not searched - Currently in triage    Belongings: Remain with patient    Independent Ambulation at Baseline: Yes/No: Yes    Participates in Care/Conversation: Yes/No: Yes    Patient Informed about EMPATH: {Yes/No:800396:::1}    DEC: {DEC:557119}    Patient Ready to be Transferred to [facility]? {Yes/No:447397:::1}

## 2025-04-10 NOTE — PROGRESS NOTES
Pt brought urine sample cup back to RN station, however, sample was cold and perfectly clear in color. Concern that sample may have been water. Provider notified.     Will attempt to collect another sample.

## 2025-04-10 NOTE — CONSULTS
"Diagnostic Evaluation Consultation  Crisis Assessment    Patient Name: Bria Breen  Age:  44 year old  Legal Sex: female  Gender Identity: other  Pronouns: they/them/theirs  Race: White  Ethnicity: Not  or   Language: English      Patient was assessed: In person   Crisis Assessment Start Date: 04/10/25  Crisis Assessment Start Time: 1045  Crisis Assessment Stop Time: 1105  Patient location: [facility] Emergency Dept                             [id number]    Referral Data and Chief Complaint  Bria Breen presents to the ED by  self. Patient is presenting to the ED for the following concerns: Anxiety, Worsening psychosocial stress.     Factors that make the mental health crisis life threatening or complex are: Patient brought self to the ED for worsening auditory hallucinations that are saying negative things to her; denies these AH are command in nature.  Patient reports not taking his medications for the past couple of months.  Patient describes mood has angry, closely to related to intensity of AH; and not feeling trustful of helping professionals.  He denies HI, VH, paranoia, and delusions.  Patient reports \"not really\" for SI and reports \"I am not the type of person to hurt myself.\"  Patient is homeless, has been staying in hotels for the past couple of months.  Patient presents with good eye contact, thoughts are slightly disjointed and asks random questions in the middle of statements (asked about writer's shoes, music interest).  Patient reports no social supports even though they talked to their mother this morning reportedly.  Patient is not connected to any professional providers at this time..      Informed Consent and Assessment Methods  Explained the crisis assessment process, including applicable information disclosures and limits to confidentiality, assessed understanding of the process, and obtained consent to proceed with the assessment.  Assessment methods included " conducting a formal interview with patient, review of medical records, collaboration with medical staff, and obtaining relevant collateral information from family and community providers when available.  : done     History of the Crisis   Patient has been diagnosed with ADHD, JOHANNA, MDD, OCD, BPD, Schizoaffective/Schizophrenia. Patient reports prior suicide attempts via jumping off bridges.  He reports recently living at [facility] and [facility] however did not like the environment (number of residents, lack of privacy, etc).  History of MICD commitment and guardianship.    Brief Psychosocial History  Family:  Single, Children no  Support System:   (reports no social supports)  Employment Status:  unemployed  Source of Income:  salary/wages  Financial Environmental Concerns:  unemployed  Current Hobbies:  social media/computer activities  Barriers in Personal Life:  emotional concerns, mental health concerns    Significant Clinical History  Current Anxiety Symptoms:   (none reported)  Current Depression/Trauma:  helplessness, withdrawal/isolation, negativistic, low self esteem  Current Somatic Symptoms:   (none reported)  Current Psychosis/Thought Disturbance:  auditory hallucinations  Current Eating Symptoms:   (no concerns noted)  Chemical Use History:  Alcohol: Binge  Last Use:: 04/09/25  Benzodiazepines: None  Opiates: None  Cocaine: Snorted (notes using cocaine to help reduce voices and contain ADHD)  Last Use:: 04/02/25  Marijuana: None  Other Use: None  Withdrawal Symptoms:  (denies)  Addictions:  (none reported)   Past diagnosis:  ADHD, Anxiety Disorder, Depression, Personality Disorder, Suicide attempt(s), Schizophrenia, Substance Use Disorder, Other  Family history:  No known history of mental health or chemical health concerns  Past treatment:  Psychiatric Medication Management, Individual therapy, Inpatient Hospitalization, Civil Commitment  Details of most recent treatment:     Other relevant  history:       Have there been any medication changes in the past two weeks:  patient is not on psychiatric meds (He has not taken any medications for couple of months.)       Is the patient compliant with medications:  no        Collateral Information  Is there collateral information: Yes     Collateral information name, relationship, phone number:  Kia Castro, mother (legal guardian),[phone]    What happened today: Patient told mother that they were coming in to the ED today.     What is different about patient's functioning: Mother reports that patient has been unhoused since leaving [facility] end of February 2025.  Patient is also not taking medications or working with any healthcare professionals.  Mother notes patient has history of drug use while homeless.     What do you think the patient needs:  medications and stabilization    Has patient made comments about wanting to kill themselves/others: no    If d/c is recommended, can they take part in safety/aftercare planning:  yes    Additional collateral information:  Mother is doing what she can to reconnect patient with ACT Team  and/or CADI waiver worker.     Risk Assessment  [place] Suicide Severity Rating Scale Full Clinical Version:  Suicidal Ideation  Q6 Suicide Behavior (Lifetime): yes          [place] Suicide Severity Rating Scale Recent:   Suicidal Ideation (Recent)  Q1 Wished to be Dead (Past Month): no  Q2 Suicidal Thoughts (Past Month): no  Q3 Suicidal Thought Method: no  Q4 Suicidal Intent without Specific Plan: no  Q5 Suicide Intent with Specific Plan: no  Level of Risk per Screen: no risks indicated          Environmental or Psychosocial Events: ongoing abuse of substances, other life stressors, helplessness/hopelessness, challenging interpersonal relationships, geographic isolation from supports, barriers to accessing healthcare  Protective Factors: Protective Factors: help seeking, able to access care without  barriers, lives in a responsibly safe and stable environment    Does the patient have thoughts of harming others? Feels Like Hurting Others: no  Previous Attempt to Hurt Others: no  Is the patient engaging in sexually inappropriate behavior?: no  Does Patient have a known history of aggressive behavior: No    Is the patient engaging in sexually inappropriate behavior?  no        Mental Status Exam   Affect: Appropriate  Appearance: Appropriate  Attention Span/Concentration: Attentive  Eye Contact: Engaged    Fund of Knowledge: Appropriate   Language /Speech Content: Fluent  Language /Speech Volume: Normal  Language /Speech Rate/Productions: Articulate  Recent Memory: Variable  Remote Memory: Variable  Mood: Depressed  Orientation to Person: Yes   Orientation to Place: Yes  Orientation to Time of Day: Yes  Orientation to Date: Yes     Situation (Do they understand why they are here?): Yes  Psychomotor Behavior: Normal  Thought Content: Hallucinations  Thought Form: Loose Associations        Medication  Psychotropic medications:   Medication Orders - Psychiatric (From admission, onward)      Start     Dose/Rate Route Frequency Ordered Stop    04/10/25 1032  OLANZapine zydis (zyPREXA) ODT tab 10 mg         10 mg Oral EVERY 6 HOURS PRN 04/10/25 1032      04/10/25 1032  OLANZapine (zyPREXA) injection 10 mg         10 mg Intramuscular DAILY PRN 04/10/25 1032      04/10/25 1032  hydrOXYzine HCl (ATARAX) tablet 50 mg         50 mg Oral 3 TIMES DAILY PRN 04/10/25 1032      04/10/25 0945  nicotine (NICODERM CQ) 21 MG/24HR 24 hr patch 1 patch         1 patch  over 24 Hours Transdermal DAILY 04/10/25 0941      04/10/25 0941  nicotine (NICORETTE) gum 2 mg         2 mg Buccal EVERY 1 HOUR PRN 04/10/25 0941               Current Care Team  Patient Care Team:  Radha Zeng PA-C as PCP - General    Diagnosis  Patient Active Problem List   Diagnosis Code    CARDIOVASCULAR SCREENING; LDL GOAL LESS THAN 160 Z13.6    Cholecystitis  K81.9    Suicidal ideation R45.851    Auditory hallucinations R44.0    Depression with anxiety F41.8    Schizoaffective disorder, depressive type (H) F25.1    ADHD (attention deficit hyperactivity disorder), 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    Polysubstance use disorder F19.90    MDD (major depressive disorder) F32.9    Other migraine without status migrainosus, not intractable G43.809    Depression, unspecified depression type F32.A    Abnormal EKG R94.31    Anxiety F41.9    Dental caries K02.9    Bulimia nervosa F50.20    Elevated blood pressure reading R03.0    Family history of hypertension Z82.49    History of methamphetamine use F15.91    Hyperlipidemia E78.5    Hypertension I10    Microcytic anemia D50.9    Nicotine dependence F17.200    Opioid use disorder in remission F11.91    Body dysmorphic disorder F45.22    Depression, major, recurrent F33.9    Schizoaffective disorder (H) F25.9    Encounter for monitoring opioid maintenance therapy Z51.81, Z79.891    Schizophrenia (H) F20.9    Methamphetamine use (H) F15.10    Stimulant use disorder F15.90    Opioid use disorder F11.90    Methamphetamine use disorder, moderate (H) F15.20    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    Alcohol dependence (H) F10.20    Suicidal thoughts R45.851    Alcohol use disorder F10.90    Borderline personality disorder (H) F60.3    Malingering Z76.5    Unsheltered homelessness Z59.02       Primary Problem This Admission  Active Hospital Problems    ADHD (attention deficit hyperactivity disorder), inattentive type      Anxiety      *Schizoaffective disorder (H)      Depression, major, recurrent        Clinical Summary and Substantiation of Recommendations   Clinical Substantiation:  It is recommendation of writer that patient stay in observation tonight as he is reporting worsening auditory hallucinations, admits to not taking any medications for several months, and being unhoused.  Patient reported left their [facility] residential  facility end of February 2025.  Patient is help seeking by bringing self to the ED.  Patient describes mood as angry and does not have much trust with helping professionals.  Patient has been staying in various motel rooms recently, not sleeping well, and denies SI/HI.  Patient does not have any supports currently.  He does report substance use about once weekly and uses alcohol and cocaine.    Goals for crisis stabilization:  decrease auditory hallucinations, restart medications    Next steps for Care Team:  restart medications as guidance of provider    Treatment Objectives Addressed:  rapport building, orienting the patient to therapy, processing feelings, identifying an appropriate aftercare plan, assessing safety, identifying additional supports, exploring obstacles to safety in the community    Therapeutic Interventions:  Engaged in cognitive restructuring/ reframing, looked at common cognitive distortions and challenged negative thoughts., Engaged in guided discovery, explored patient's perspectives and helped expand them through socratic dialogue., Explored motivation for behavioral change.    Has a specific means been identified for suicidal/homicide actions: No      Patient coping skills attempted to reduce the crisis:  brought self to ED    Disposition  Recommended referrals:          Reviewed case and recommendations with attending provider. Attending Name: provider has not seen patient at time of charting.       Attending concurs with disposition:         Patient and/or validated legal guardian concurs with disposition: yes       Final disposition:  observation    Legal status: Voluntary/Patient has signed consent for treatment    Reviewed court records: yes       Assessment Details   Total duration spent with the patient: 20 min     CPT code(s) utilized: 61842 - Psychotherapy (with patient) - 30 (16-37*) min    BEE Figueroa, LICSW  Licensed Mental Health Professional (LMHP)  [place]  [phone]

## 2025-04-10 NOTE — PROGRESS NOTES
44 year old non-binary adult (he/him pronouns) with history of PSUD, schizophrenia received from ED due to hallucinations and anxiety. Reports hearing voices over the last several months that say negative hurtful comments about him. Pt reports not taking any current mental health medications and does not have a therapist or psychiatrist. Pt currently living in hotels or on the street. Pt denies SI/HI. Reports recent alcohol use (a few days ago) but denies any illicit drug use in the last several months.     Pt is slightly intrusive with peers and staff, asking personal questions, but easily verbally redirected. Cooperative with cares at this time.     Nursing and risk assessments completed. Assessments reviewed with LMHP and physician. Admission information reviewed with patient. Patient given a tour of [facility] and instructions on using the facility. Questions regarding [facility] addressed. Pt safety search completed.

## 2025-04-11 PROBLEM — R44.0 AUDITORY HALLUCINATIONS: Status: ACTIVE | Noted: 2023-09-12

## 2025-04-11 PROBLEM — Z59.00 HOMELESSNESS: Status: ACTIVE | Noted: 2025-04-11

## 2025-04-11 PROBLEM — F20.9 SCHIZOPHRENIA, UNSPECIFIED TYPE (H): Status: ACTIVE | Noted: 2025-04-11

## 2025-04-11 PROBLEM — F29 PSYCHOSIS (H): Status: ACTIVE | Noted: 2025-04-11

## 2025-04-11 PROBLEM — R03.0 ELEVATED BLOOD PRESSURE READING: Status: ACTIVE | Noted: 2022-10-10

## 2025-04-11 PROCEDURE — 250N000013 HC RX MED GY IP 250 OP 250 PS 637: Performed by: PSYCHIATRY & NEUROLOGY

## 2025-04-11 PROCEDURE — G0378 HOSPITAL OBSERVATION PER HR: HCPCS

## 2025-04-11 PROCEDURE — 250N000013 HC RX MED GY IP 250 OP 250 PS 637: Performed by: STUDENT IN AN ORGANIZED HEALTH CARE EDUCATION/TRAINING PROGRAM

## 2025-04-11 PROCEDURE — 99222 1ST HOSP IP/OBS MODERATE 55: CPT | Performed by: PSYCHIATRY & NEUROLOGY

## 2025-04-11 PROCEDURE — 250N000013 HC RX MED GY IP 250 OP 250 PS 637: Performed by: NURSE PRACTITIONER

## 2025-04-11 RX ORDER — LOSARTAN POTASSIUM 50 MG/1
50 TABLET ORAL DAILY
Status: DISCONTINUED | OUTPATIENT
Start: 2025-04-11 | End: 2025-04-12 | Stop reason: HOSPADM

## 2025-04-11 RX ORDER — HYDROXYZINE HYDROCHLORIDE 50 MG/1
50 TABLET, FILM COATED ORAL EVERY 6 HOURS PRN
Status: DISCONTINUED | OUTPATIENT
Start: 2025-04-11 | End: 2025-04-12 | Stop reason: HOSPADM

## 2025-04-11 RX ORDER — ACETAMINOPHEN 325 MG/1
650 TABLET ORAL EVERY 4 HOURS PRN
Status: DISCONTINUED | OUTPATIENT
Start: 2025-04-11 | End: 2025-04-12 | Stop reason: HOSPADM

## 2025-04-11 RX ORDER — TRAZODONE HYDROCHLORIDE 50 MG/1
50 TABLET ORAL
Status: DISCONTINUED | OUTPATIENT
Start: 2025-04-11 | End: 2025-04-12 | Stop reason: HOSPADM

## 2025-04-11 RX ORDER — QUETIAPINE FUMARATE 100 MG/1
100 TABLET, FILM COATED ORAL 3 TIMES DAILY PRN
Status: DISCONTINUED | OUTPATIENT
Start: 2025-04-11 | End: 2025-04-11

## 2025-04-11 RX ORDER — OLANZAPINE 5 MG/1
5 TABLET, ORALLY DISINTEGRATING ORAL EVERY 6 HOURS PRN
Status: DISCONTINUED | OUTPATIENT
Start: 2025-04-11 | End: 2025-04-12 | Stop reason: HOSPADM

## 2025-04-11 RX ORDER — OLANZAPINE 10 MG/1
10 TABLET, FILM COATED ORAL AT BEDTIME
Status: DISCONTINUED | OUTPATIENT
Start: 2025-04-11 | End: 2025-04-12 | Stop reason: HOSPADM

## 2025-04-11 RX ORDER — ONDANSETRON 4 MG/1
4 TABLET, ORALLY DISINTEGRATING ORAL EVERY 6 HOURS PRN
Status: DISCONTINUED | OUTPATIENT
Start: 2025-04-11 | End: 2025-04-12 | Stop reason: HOSPADM

## 2025-04-11 RX ADMIN — HYDROXYZINE HYDROCHLORIDE 50 MG: 50 TABLET ORAL at 18:24

## 2025-04-11 RX ADMIN — OLANZAPINE 5 MG: 5 TABLET, ORALLY DISINTEGRATING ORAL at 13:57

## 2025-04-11 RX ADMIN — LOSARTAN POTASSIUM 50 MG: 50 TABLET, FILM COATED ORAL at 21:18

## 2025-04-11 RX ADMIN — OLANZAPINE 10 MG: 10 TABLET, FILM COATED ORAL at 21:18

## 2025-04-11 ASSESSMENT — ACTIVITIES OF DAILY LIVING (ADL)
HYGIENE/GROOMING: INDEPENDENT
ADLS_ACUITY_SCORE: 51
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
## 2025-04-11 NOTE — ED NOTES
"Patient was awakened to meet with provider. After, writer approached patient and offered to heat up their lunch. Patient declined. Offered patient courtesy meal, walking them over to the snack station. Patient turned to face writer and said, \"what?\", I then reiterated what was available to eat, patient stated, \"I can sense vibes, and I don't like your vibes\", and went back to their recliner with a courtesy meal.   "

## 2025-04-11 NOTE — PROGRESS NOTES
Patient has been resting in recliner chair since the start of the shift. Breathing even and unlabored. Shifting weight independently on occasion.

## 2025-04-11 NOTE — ED NOTES
"Patient declined vitals, stating \"I want to sleep more\". Did request I leave breakfast on their recliner tray.  "

## 2025-04-11 NOTE — ED NOTES
"Pt has been resting in their chair most of the shift and watching some TV. Pt continues to endorse hearing voices however did not want to discuss what they were saying Pt was slightly agitated when staff approached and was upset when staff requested a new UA. Pt stated \"I already did that and I'm not going to do it again.\" Pt continues to have SI at times. Pt is unable to talk abelino about this. Pt is guarded and withdrawn. Pt provided addition PRN of olanzapine to help with voices and current sx. Plan to reassess tomorrow morning to discuss further disposition.   "

## 2025-04-11 NOTE — PROGRESS NOTES
"Triage & Transition Services, Extended Care     Therapy Progress Note    Patient: Cam goes by \"Cam,\" uses he/him pronouns  Date of Service: April 11, 2025  Site of Service: [facility] Emergency Dept                             [id number]  Patient was seen yes  Mode of Assessment: In person    Presentation Summary: Upon therapeutic check-in today, pt tells this writer that he is experiencing auditory hallucinations telling him to hurt specific people. Pt declines to elaborate on who the voices are telling him to hurt saying, \"I can't tell you.\" He rates the intensity of his auditory hallucinations as a 6 on a scale of 1-5 with 5 being the most severe. He denies visual hallucinations. Pt endorses paranoia, specifically thinking that everyone knows his thoughts even if he does not say them out loud. He denies SI, including thoughts of wanting to fall asleep and not wake-up. He endorses last using methamphetamines about a week ago and averaging 3 drinks of hard liquor a day. Pt is currently unhoused. He is interested in being referred to a crisis residence tomorrow for continued support and completing a ESCOBAR assessment on an outpatient basis.    Therapeutic Intervention(s) Provided: Engaged in guided discovery, explored patient's perspectives and helped expand them through socratic dialogue.    Current Symptoms: anxious   anxious auditory hallucinations (paranoia)      Mental Status Exam   Affect: Dramatic  Appearance: Disheveled  Attention Span/Concentration: Other (please comment) (variable)  Eye Contact: Engaged    Fund of Knowledge: Appropriate   Language /Speech Content: Fluent  Language /Speech Volume: Normal  Language /Speech Rate/Productions: Normal  Recent Memory: Intact  Remote Memory: Intact  Mood: Anxious  Orientation to Person: Yes   Orientation to Place: Yes  Orientation to Time of Day: Yes  Orientation to Date: Yes     Situation (Do they understand why they are here?): Yes  Psychomotor " "Behavior: Normal  Thought Content: Hallucinations, Paranoia  Thought Form: Goal Directed    Treatment Objective(s) Addressed: processing feelings, assessing safety    Patient Response to Interventions: acceptance expressed, verbalizes understanding    Progress Towards Goals: Patient Reports Symptoms Are: ongoing  Patient Progress Toward Goals: is not making progress  Comment: Pt continues to endorse auditory hallucinations and paranoia.  Next Step to Work Toward Discharge: symptom stabilization  Symptom Stabilization Comment: Pt continues to endorse auditory hallucinations and paranoia.    Case Management: Summary of Interaction: This writer reviewed pt's history of guardianship in [place] court look-up. Per court file [id number]-, pt's mother Zita Castro was his legal guardian and he was discharged from guardianship on 1/29/25. The order states: \"IT IS ORDERED:  1. The guardianship is terminated and all of Bria Foley s rights and turner are hereby restored effective immediately.  2. Zita Castro is discharged as Guardian of Bria Foley. 3. The court-appointed  for Person Subject to Guardianship is discharged upon the expiration of the time to appeal this Order.\"    Plan: observation  yes provider, BETHANY Stuart, in agreement  yes    Clinical Substantiation: It is the recommendation of this writer that pt remain on observation status overnight at [facility] for further treatment of auditory hallucinations and paranoia. The plan is for pt to discharge to a crisis residence tomorrow with therapy, psychiatry, and a ESCOBAR assessment referral in place. Pt is aware that a crisis residence may not be available tomorrow based on bed openings.    Legal Status: Legal Status: Voluntary/Patient has signed consent for treatment    Session Status: Time session started: 1026  Time session ended: 1042  Session Duration (minutes): 16 minutes  Session Number: 1  Anticipated number of sessions or this episode of care: " 2    Time Spent: 16 minutes    CPT Code: CPT Codes: 94302 - Psychotherapy (with patient) - 30 (16-37*) min    Diagnosis:   Patient Active Problem List   Diagnosis Code    CARDIOVASCULAR SCREENING; LDL GOAL LESS THAN 160 Z13.6    Cholecystitis K81.9    Suicidal ideation R45.851    Auditory hallucinations R44.0    Depression with anxiety F41.8    Schizoaffective disorder, depressive type (H) F25.1    ADHD (attention deficit hyperactivity disorder), 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    Polysubstance use disorder F19.90    MDD (major depressive disorder) F32.9    Other migraine without status migrainosus, not intractable G43.809    Depression, unspecified depression type F32.A    Abnormal EKG R94.31    Anxiety F41.9    Dental caries K02.9    Bulimia nervosa F50.20    Elevated blood pressure reading R03.0    Family history of hypertension Z82.49    History of methamphetamine use F15.91    Hyperlipidemia E78.5    Hypertension I10    Microcytic anemia D50.9    Nicotine dependence F17.200    Opioid use disorder in remission F11.91    Body dysmorphic disorder F45.22    Depression, major, recurrent F33.9    Schizoaffective disorder (H) F25.9    Encounter for monitoring opioid maintenance therapy Z51.81, Z79.891    Schizophrenia (H) F20.9    Methamphetamine use (H) F15.10    Stimulant use disorder F15.90    Opioid use disorder F11.90    Methamphetamine use disorder, moderate (H) F15.20    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

## 2025-04-11 NOTE — PROGRESS NOTES
Pt appeared to rest comfortably through the night in the recliner.  No signs of emotional distress, physical discomfort or pain were noted during rounds or reported by the patient through the shift.  Even and unlabored respirations visualized during rounds, pt made occasional independent position changes through the night.

## 2025-04-11 NOTE — PSYCH
Writer received call from unit nurse regarding empath admission orders for patient admitted to empath given psychosis. Patient reported to be medically cleared. Empath admission orders placed.

## 2025-04-11 NOTE — PROGRESS NOTES
"Patient woke from resting and used the bathroom. Patient returned to recliner chair in milieu and was observed eating and watching TV. RN checked in on patient. Patient stated they are feeling anxious and requested PRN hydroxyzine. Given PRN hydroxyzine 50 mg. Vital signs assessed, patient hypertensive. RN asked patient if they take blood pressure medication and patient responded \"not yet.\" Psychiatric provider notified.   "

## 2025-04-11 NOTE — ED NOTES
RN notified On-call provider at 2304 through the [employer] answering service to request a consult, routine orders to register patient to observation. Spoke to Kecia, patient coordinator. Awaiting a call back from the provider.

## 2025-04-12 VITALS
TEMPERATURE: 97.6 F | SYSTOLIC BLOOD PRESSURE: 172 MMHG | BODY MASS INDEX: 28.06 KG/M2 | WEIGHT: 168.4 LBS | HEART RATE: 89 BPM | DIASTOLIC BLOOD PRESSURE: 87 MMHG | HEIGHT: 65 IN | RESPIRATION RATE: 20 BRPM | OXYGEN SATURATION: 100 %

## 2025-04-12 PROCEDURE — 250N000013 HC RX MED GY IP 250 OP 250 PS 637: Performed by: NURSE PRACTITIONER

## 2025-04-12 PROCEDURE — G0378 HOSPITAL OBSERVATION PER HR: HCPCS

## 2025-04-12 PROCEDURE — 99239 HOSP IP/OBS DSCHRG MGMT >30: CPT | Performed by: PSYCHIATRY & NEUROLOGY

## 2025-04-12 PROCEDURE — 250N000013 HC RX MED GY IP 250 OP 250 PS 637: Performed by: PSYCHIATRY & NEUROLOGY

## 2025-04-12 RX ORDER — CLONIDINE HYDROCHLORIDE 0.1 MG/1
0.1 TABLET ORAL ONCE
Status: COMPLETED | OUTPATIENT
Start: 2025-04-12 | End: 2025-04-12

## 2025-04-12 RX ORDER — LOSARTAN POTASSIUM 50 MG/1
50 TABLET ORAL DAILY
Qty: 10 TABLET | Refills: 0 | Status: ON HOLD | OUTPATIENT
Start: 2025-04-12 | End: 2025-05-20

## 2025-04-12 RX ORDER — OLANZAPINE 10 MG/1
10 TABLET, FILM COATED ORAL AT BEDTIME
Qty: 10 TABLET | Refills: 0 | Status: SHIPPED | OUTPATIENT
Start: 2025-04-12 | End: 2025-04-12

## 2025-04-12 RX ORDER — OLANZAPINE 10 MG/1
10 TABLET, FILM COATED ORAL AT BEDTIME
Qty: 10 TABLET | Refills: 0 | Status: ON HOLD | OUTPATIENT
Start: 2025-04-12 | End: 2025-05-20

## 2025-04-12 RX ORDER — HYDROXYZINE HYDROCHLORIDE 25 MG/1
25-50 TABLET, FILM COATED ORAL 3 TIMES DAILY PRN
Qty: 20 TABLET | Refills: 0 | Status: ON HOLD | OUTPATIENT
Start: 2025-04-12 | End: 2025-05-20

## 2025-04-12 RX ADMIN — CLONIDINE HYDROCHLORIDE 0.1 MG: 0.1 TABLET ORAL at 13:48

## 2025-04-12 RX ADMIN — NICOTINE 1 PATCH: 21 PATCH, EXTENDED RELEASE TRANSDERMAL at 09:35

## 2025-04-12 RX ADMIN — LOSARTAN POTASSIUM 50 MG: 50 TABLET, FILM COATED ORAL at 09:33

## 2025-04-12 ASSESSMENT — COLUMBIA-SUICIDE SEVERITY RATING SCALE - C-SSRS
ATTEMPT SINCE LAST CONTACT: NO
TOTAL  NUMBER OF INTERRUPTED ATTEMPTS SINCE LAST CONTACT: NO
2. HAVE YOU ACTUALLY HAD ANY THOUGHTS OF KILLING YOURSELF?: NO
6. HAVE YOU EVER DONE ANYTHING, STARTED TO DO ANYTHING, OR PREPARED TO DO ANYTHING TO END YOUR LIFE?: NO
TOTAL  NUMBER OF ABORTED OR SELF INTERRUPTED ATTEMPTS SINCE LAST CONTACT: NO
1. SINCE LAST CONTACT, HAVE YOU WISHED YOU WERE DEAD OR WISHED YOU COULD GO TO SLEEP AND NOT WAKE UP?: NO

## 2025-04-12 ASSESSMENT — ACTIVITIES OF DAILY LIVING (ADL)
ADLS_ACUITY_SCORE: 51

## 2025-04-12 NOTE — DISCHARGE INSTRUCTIONS
Type: Therapy - (In-Person)  Date: Tuesday, 4/15/2025  Time: 10:00 am - 11:00 am  Provider: Cruzito GAMBOA  [facility]  Location: [facility], [address] [address]  Phone: [phone]  Patient Instructions: New Client Paperwork can be downloaded at [email or web address]

## 2025-04-12 NOTE — ED NOTES
Patient is awake now. He was on the phone to call RE Entry.  He was talking but then told the writer that the line was busy.  The patient is irritable and suspicious along with guarded.  He did ask for  a lavendar patch.  He is talking with various patients.

## 2025-04-12 NOTE — PROGRESS NOTES
"Triage and Transition Services Extended Care Reassessment     Patient: Cam goes by \"Cam,\" uses he/him pronouns  Date of Service: April 12, 2025  Site of Service: [facility] Emergency Dept                             [id number]  Patient was seen yes  Mode of Assessment: In person     Reason for Reassessment: other (see comment) (ready for discharge)    History of Patient's Original Emergency Room Encounter: Patient has been diagnosed with ADHD, JOHANNA, MDD, OCD, BPD, Schizoaffective/Schizophrenia. Patient reports prior suicide attempts via jumping off bridges.  He reports recently living at [facility] however did not like the environment (number of residents, lack of privacy, etc).  History of MICD commitment and guardianship.    Current Patient Presentation: Pt presented as calm, cooperative, and engaged. Pt was sitting in the milieu, writing in his journal, when writer approached. Pt reports overall feeling good, and denied any SI/HI or other imminent safety concerns.    Presentation Summary: Pt presented as calm, cooperative, and engaged. Pt was sitting in the milieu, writing in his journal, when writer approached. Pt reports overall feeling good, and denied any SI/HI or other imminent safety concerns. Pt requested help with scheduling outpatient therapy, which was able to be scheduled for Pt. Pt does not display any medical reasons to remain in the ED.    Changes Observed Since Initial Assessment: decrease in presenting symptoms, patient/family request    Therapeutic Interventions Provided: Engaged in guided discovery, explored patient's perspectives and helped expand them through socratic dialogue.    Current Symptoms:  (denies current symptoms)  (denies current symptoms) anxious  (denies current symptoms)  (denies current symptoms)    Mental Status Exam   Affect: Appropriate  Appearance: Disheveled  Attention Span/Concentration: Attentive  Eye Contact: Engaged    Fund of " "Knowledge: Appropriate   Language /Speech Content: Fluent  Language /Speech Volume: Normal  Language /Speech Rate/Productions: Normal  Recent Memory: Intact  Remote Memory: Intact  Mood: Normal  Orientation to Person: Yes   Orientation to Place: Yes  Orientation to Time of Day: Yes  Orientation to Date: Yes     Situation (Do they understand why they are here?): Yes  Psychomotor Behavior: Normal  Thought Content: Clear  Thought Form: Intact    Treatment Objective(s) Addressed: processing feelings, assessing safety, safety planning, identifying an appropriate aftercare plan    Patient Response to Interventions: acceptance expressed, verbalizes understanding    Progress Towards Goals:  Patient Reports Symptoms Are: improving  Patient Progress Toward Goals: is not making progress  Comment: Pt reports improved AH today  Next Step to Work Toward Discharge: symptom stabilization  Symptom Stabilization Comment: Pt appears to have stabilized and reached maximum benefit from EmPATH; able to discharge today    Case Management: Case Management Included: completing or following up on referrals  Details on completing or following up on referrals: scheduled with oupatient therapy  Summary of Interaction: This writer reviewed pt's history of guardianship in [place] court look-up. Per court file [id number]-, pt's mother Zita Castro was his legal guardian and he was discharged from guardianship on 1/29/25. The order states: \"IT IS ORDERED:  1. The guardianship is terminated and all of Bria Foley s rights and turner are hereby restored effective immediately.  2. Zita Castro is discharged as Guardian of Bria Foley. 3. The court-appointed  for Person Subject to Guardianship is discharged upon the expiration of the time to appeal this Order.\"    C-SSRS Since Last Contact:   1. Wish to be Dead (Since Last Contact): No  2. Non-Specific Active Suicidal Thoughts (Since Last Contact): No     Actual Attempt (Since Last " Contact): No  Has subject engaged in non-suicidal self-injurious behavior? (Since Last Contact): No  Interrupted Attempts (Since Last Contact): No  Aborted or Self-Interrupted Attempt (Since Last Contact): No  Preparatory Acts or Behavior (Since Last Contact): No  Actual Lethality/Medical Damage Code (Most Lethal Attempt):  (NA)  Calculated C-SSRS Risk Score (Since Last Contact): No Risk Indicated    Plan: Final Disposition / Recommended Care Path: discharge  Plan for Care reviewed with assigned Medical Provider: yes  Plan for Care Team Review: provider, RN  Comments: MD Willie; agrees with discharge  Patient and/or validated legal guardian concurs: yes  Clinical Substantiation: Pt was able to stabilize while in the ED, and appears to have reached maximum benefit from EmPATH. Pt was able to engage in safety planning and was able to advocate for self in wanting an outpatient therapist, which was scheduled for Pt.    Legal Status: Legal Status: Voluntary/Patient has signed consent for treatment    Session Status: Time session started: 1330  Time session ended: 1338  Session Duration (minutes): 8 minutes  Session Number: 2  Anticipated number of sessions or this episode of care: 2    Session Start Time: 1330  Session Stop Time: 1338  CPT codes: Non-Billable  Time Spent: 8 minutes      CPT code(s) utilized: Non-Billable    Diagnosis:   Patient Active Problem List   Diagnosis Code    CARDIOVASCULAR SCREENING; LDL GOAL LESS THAN 160 Z13.6    Cholecystitis K81.9    Suicidal ideation R45.851    Auditory hallucinations R44.0    Depression with anxiety F41.8    Schizoaffective disorder, depressive type (H) F25.1    ADHD (attention deficit hyperactivity disorder), 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    Polysubstance use disorder F19.90    MDD (major depressive disorder) F32.9    Other migraine without status migrainosus, not intractable G43.809    Depression, unspecified depression type F32.A    Abnormal EKG R94.31    Anxiety F41.9     Dental caries K02.9    Bulimia nervosa F50.20    Elevated blood pressure reading R03.0    Family history of hypertension Z82.49    History of methamphetamine use F15.91    Hyperlipidemia E78.5    Hypertension I10    Microcytic anemia D50.9    Nicotine dependence F17.200    Opioid use disorder in remission F11.91    Body dysmorphic disorder F45.22    Depression, major, recurrent F33.9    Schizoaffective disorder (H) F25.9    Encounter for monitoring opioid maintenance therapy Z51.81, Z79.891    Schizophrenia (H) F20.9    Methamphetamine use (H) F15.10    Stimulant use disorder F15.90    Opioid use disorder F11.90    Methamphetamine use disorder, moderate (H) F15.20    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

## 2025-04-12 NOTE — ED NOTES
Patient talked to [facility] and they accepted him for 1500 today.  Then the nurse called and said that the blood pressures needed to be under better control.  Explained that the patient is on cozaar and started that yesterday and had a dose today.  Explained that there is not much more we can do but Writer will talk to Dr. Stuart.  Dr. Stuart ordered a one time dose of clonidine and it was given. The blood pressure was 172/87 before the clonidine.  Attempted to call [facility]'s nurse Danielle Zarate at [phone].  Left her a message to call back.

## 2025-04-12 NOTE — ED NOTES
Re Entry nurse called back and they said that they would not take the patient due to an elevated blood pressure.  Cam decided to discharge to his car. Discharge instructions reviewed with patient including follow-up care plan. Educated on medication regime and advised not to stop prescribed medication without consulting their physician. Reviewed safety plan and outpatient resources/appointments.Verbalized understanding of discharge instructions. Denies SI. All belongings which where brought into the hospital have been returned to patient. Escorted off the unit @  4953 with staff.     Discharged to her car with staff.

## 2025-04-12 NOTE — ED PROVIDER NOTES
"EmPATH Unit - Psychiatric Observation Discharge Summary  [facility] Emergency Department  Discharge Date: 4/12/2025    Bria Breen MRN: [id number]   Age: 44 year old YOB: 1980     Brief HPI & Initial ED Course     Chief Complaint   Patient presents with    Hallucinations            HPI  Bria Breen is a 44 year old adult with history notable for mood instability further complicated by substance use disorders involving methamphetamines and opiates.  Documentation indicates a differential diagnosis that includes malingering, stimulant induced psychosis, and consideration for a schizoaffective disorder.  She is currently under observation status on the EmPATH unit, now approaching 53 hours in the emergency department.  Overnight, there were no acute issues.  On reassessment today, the patient was quick to initiate a conversation regarding stimulants.  She explains that she believes she has ADHD and responds very well to Concerta.  She inquired if the medication can be started here.  She currently does not have an outpatient provider nor does she have an active prescription.  Otherwise, she slept well.  Energy is fair.  Appetite is normal.  Mood appears to have improved.  Auditory hallucinations have decreased in intensity.  She did not report command hallucinations.  She denied homicidal ideation.  Suicidal thoughts are present and described as being passive without active plan or intent.  She continues to feel anxious regarding her disposition plan due to housing insecurity.        Physical Examination   BP: (!) 163/105  Pulse: 89  Temp: 97.6  F (36.4  C)  Resp: 20  Height: 165.1 cm (5' 5\")  Weight: 76.4 kg (168 lb 6.4 oz)  SpO2: 100 %    Physical Exam  General: Appears stated age.   Neuro: Alert and fully oriented. Extremities appear to demonstrate normal strength on visual inspection.   Integumentary/Skin: no rash visualized, normal color    Psychiatric Examination   Appearance: awake, " alert  Attitude:  cooperative  Eye Contact:  fair  Mood:  better  Affect:  appropriate and in normal range  Speech:  clear, coherent  Psychomotor Behavior:  no evidence of tardive dyskinesia, dystonia, or tics  Thought Process:  logical and linear  Associations:  no loose associations  Thought Content:  no evidence of suicidal ideation or homicidal ideation and no evidence of psychotic thought  Insight:  fair  Judgement:  fair  Oriented to:  time, person, and place  Attention Span and Concentration:  fair  Recent and Remote Memory:  fair  Language: able to name/identify objects without impairment  Fund of Knowledge: intact with awareness of current and past events    Results     ED Course as of 04/12/25 1219   Thu Apr 10, 2025   5600 I obtained history and examined the patient as noted above. I explained findings to the patient and we discussed plan for transfer to [facility]. The patient is comfortable with this plan.        Labs Ordered and Resulted from Time of ED Arrival to Time of ED Departure   URINE DRUG SCREEN PANEL - Normal       Result Value    Amphetamines Urine Screen Negative      Barbituates Urine Screen Negative      Benzodiazepine Urine Screen Negative      Cannabinoids Urine Screen Negative      Cocaine Urine Screen Negative      Fentanyl Qual Urine Screen Negative      Opiates Urine Screen Negative      PCP Urine Screen Negative         Observation Course   The patient was found to have a psychiatric condition that would benefit from an observation stay in the emergency department for further psychiatric stabilization and/or coordination of a safe disposition. The plan upon observation admission included serial assessments of psychiatric condition, potential administration of medications if indicated, further disposition pending the patient's psychiatric course during the monitoring period.     Serial assessments of the patient's psychiatric condition were performed. Nursing notes were reviewed. During  the observation period, the patient did not require medications for agitation, and did not require restraints/seclusion for patient and/or provider safety.     After a period of working with the treatment team on the EmPATH unit, the patient's mental state improved to allow a safe transition to outpatient care. After counseling on the diagnosis, work-up, and treatment plan, the patient was discharged. Close follow-up with a psychiatrist and/or therapist was recommended and community psychiatric resources were provided. Patient is to return to the ED if any urgent or potentially life-threatening concerns.     Discharge Diagnoses:   Final diagnoses:   Auditory hallucinations   Borderline personality disorder (H)   Methamphetamine use disorder, moderate (H)   Homelessness   Psychosis, unspecified psychosis type (H) - consider substance-induced vs Schozoaffective disorder vs malingering       Treatment Plan:  -Continue Zyprexa 10 mg nightly for antipsychotic and mood stabilizing treatment  -The patient is not interested in completing a chemical health assessment.  Resources for completing a chemical health assessment in the community when she is ready to do so.  -Resources for outpatient mental health treatment including medication management and psychotherapy.  The patient is seeking stimulant medications today for management of presumed ADHD.  We discussed the importance of establishing with an outpatient provider prior to considering treatment with controlled substances and consideration for psychological testing to aid in diagnostic clarity.  -We will attempt to secure a bed at a crisis facility, if available.  Otherwise, we will can discharging back to the community today.      At the time of discharge, the patient's acute suicide risk was determined to be low due to the following factors: Reduction in the intensity of mood/anxiety symptoms that preceded the admission, denial of suicidal thoughts, denies feeling  helpless or hopeless, not currently under the influence of alcohol or illicit substances, denies experiencing command hallucinations, no immediate access to firearms. The patient's acute risk could be higher if noncompliant with their treatment plan, medications, follow-up appointments or using illicit substances or alcohol.    I spent more than 30 minutes on discharge day activities.    --  Gino Stuart MD  [facility] EMERGENCY DEPT  EmPATH Unit       Gino Stuart MD  04/12/25 5134

## 2025-04-13 ENCOUNTER — TELEPHONE (OUTPATIENT)
Dept: BEHAVIORAL HEALTH | Facility: CLINIC | Age: 45
End: 2025-04-13
Payer: COMMERCIAL

## 2025-04-13 NOTE — TELEPHONE ENCOUNTER
Writer left VM for patient for reaching out if they need any assistance with establishing follow up care. No further follow up required.

## 2025-04-15 ENCOUNTER — PATIENT OUTREACH (OUTPATIENT)
Dept: CARE COORDINATION | Facility: CLINIC | Age: 45
End: 2025-04-15
Payer: COMMERCIAL

## 2025-04-15 NOTE — PROGRESS NOTES
Connected Care Resource Center Contact  [facility]/Voicemail     Clinical Data: Post-Discharge Outreach     Outreach attempted x 2.  Left message on patient's voicemail, providing [facility]'s central phone number of [phone] ([phone]) for questions/concerns and/or to schedule an appt with an [facility] provider, if they do not have a PCP.      Plan:  [facility] will do no further outreaches at this time.       PETR Ann  [facility], [facility]    *Connected Care Resource Team does NOT follow patient ongoing. Referrals are identified based on internal discharge reports and the outreach is to ensure patient has an understanding of their discharge instructions.

## 2025-05-12 ENCOUNTER — TELEPHONE (OUTPATIENT)
Dept: BEHAVIORAL HEALTH | Facility: CLINIC | Age: 45
End: 2025-05-12

## 2025-05-12 ENCOUNTER — HOSPITAL ENCOUNTER (INPATIENT)
Facility: CLINIC | Age: 45
End: 2025-05-12
Attending: EMERGENCY MEDICINE | Admitting: PSYCHIATRY & NEUROLOGY
Payer: MEDICARE

## 2025-05-12 DIAGNOSIS — I10 HYPERTENSION, UNSPECIFIED TYPE: Primary | ICD-10-CM

## 2025-05-12 DIAGNOSIS — R52 PAIN: ICD-10-CM

## 2025-05-12 DIAGNOSIS — F17.200 NICOTINE DEPENDENCE, UNCOMPLICATED, UNSPECIFIED NICOTINE PRODUCT TYPE: ICD-10-CM

## 2025-05-12 DIAGNOSIS — F23 ACUTE PSYCHOSIS (H): ICD-10-CM

## 2025-05-12 DIAGNOSIS — F25.0 SCHIZOAFFECTIVE DISORDER, BIPOLAR TYPE (H): ICD-10-CM

## 2025-05-12 DIAGNOSIS — T78.2XXS ACUTE ANAPHYLAXIS, SEQUELA: ICD-10-CM

## 2025-05-12 DIAGNOSIS — F25.1 SCHIZOAFFECTIVE DISORDER, DEPRESSIVE TYPE (H): ICD-10-CM

## 2025-05-12 DIAGNOSIS — I15.9 SECONDARY HYPERTENSION: ICD-10-CM

## 2025-05-12 LAB
ALBUMIN SERPL BCG-MCNC: 4.3 G/DL (ref 3.5–5.2)
ALP SERPL-CCNC: 76 U/L (ref 40–150)
ALT SERPL W P-5'-P-CCNC: 25 U/L (ref 0–70)
AMPHETAMINES UR QL SCN: NORMAL
ANION GAP SERPL CALCULATED.3IONS-SCNC: 12 MMOL/L (ref 7–15)
AST SERPL W P-5'-P-CCNC: 18 U/L (ref 0–45)
BARBITURATES UR QL SCN: NORMAL
BASOPHILS # BLD AUTO: 0 10E3/UL (ref 0–0.2)
BASOPHILS NFR BLD AUTO: 0 %
BENZODIAZ UR QL SCN: NORMAL
BILIRUB SERPL-MCNC: 0.3 MG/DL
BUN SERPL-MCNC: 7.2 MG/DL (ref 6–20)
BZE UR QL SCN: NORMAL
CALCIUM SERPL-MCNC: 9 MG/DL (ref 8.8–10.4)
CANNABINOIDS UR QL SCN: NORMAL
CHLORIDE SERPL-SCNC: 106 MMOL/L (ref 98–107)
CREAT SERPL-MCNC: 0.99 MG/DL (ref 0.51–1.17)
EGFRCR SERPLBLD CKD-EPI 2021: 73 ML/MIN/1.73M2
EOSINOPHIL # BLD AUTO: 0.1 10E3/UL (ref 0–0.7)
EOSINOPHIL NFR BLD AUTO: 1 %
ERYTHROCYTE [DISTWIDTH] IN BLOOD BY AUTOMATED COUNT: 14.7 % (ref 10–15)
FENTANYL UR QL: NORMAL
GLUCOSE SERPL-MCNC: 95 MG/DL (ref 70–99)
HCG UR QL: NEGATIVE
HCO3 SERPL-SCNC: 24 MMOL/L (ref 22–29)
HCT VFR BLD AUTO: 40.5 % (ref 35–53)
HGB BLD-MCNC: 13 G/DL (ref 11.7–17.7)
IMM GRANULOCYTES # BLD: 0 10E3/UL
IMM GRANULOCYTES NFR BLD: 0 %
LYMPHOCYTES # BLD AUTO: 1.7 10E3/UL (ref 0.8–5.3)
LYMPHOCYTES NFR BLD AUTO: 18 %
MCH RBC QN AUTO: 25.4 PG (ref 26.5–33)
MCHC RBC AUTO-ENTMCNC: 32.1 G/DL (ref 31.5–36.5)
MCV RBC AUTO: 79 FL (ref 78–100)
MONOCYTES # BLD AUTO: 0.5 10E3/UL (ref 0–1.3)
MONOCYTES NFR BLD AUTO: 5 %
NEUTROPHILS # BLD AUTO: 6.9 10E3/UL (ref 1.6–8.3)
NEUTROPHILS NFR BLD AUTO: 75 %
NRBC # BLD AUTO: 0 10E3/UL
NRBC BLD AUTO-RTO: 0 /100
OPIATES UR QL SCN: NORMAL
PCP QUAL URINE (ROCHE): NORMAL
PLATELET # BLD AUTO: 285 10E3/UL (ref 150–450)
POTASSIUM SERPL-SCNC: 3.7 MMOL/L (ref 3.4–5.3)
PROT SERPL-MCNC: 6.8 G/DL (ref 6.4–8.3)
RBC # BLD AUTO: 5.11 10E6/UL (ref 3.8–5.9)
SODIUM SERPL-SCNC: 142 MMOL/L (ref 135–145)
TSH SERPL DL<=0.005 MIU/L-ACNC: 1.17 UIU/ML (ref 0.3–4.2)
WBC # BLD AUTO: 9.2 10E3/UL (ref 4–11)

## 2025-05-12 PROCEDURE — 80307 DRUG TEST PRSMV CHEM ANLYZR: CPT | Performed by: EMERGENCY MEDICINE

## 2025-05-12 PROCEDURE — 250N000013 HC RX MED GY IP 250 OP 250 PS 637: Performed by: EMERGENCY MEDICINE

## 2025-05-12 PROCEDURE — 36415 COLL VENOUS BLD VENIPUNCTURE: CPT | Performed by: EMERGENCY MEDICINE

## 2025-05-12 PROCEDURE — 85018 HEMOGLOBIN: CPT | Performed by: EMERGENCY MEDICINE

## 2025-05-12 PROCEDURE — 124N000002 HC R&B MH UMMC

## 2025-05-12 PROCEDURE — 99285 EMERGENCY DEPT VISIT HI MDM: CPT | Performed by: EMERGENCY MEDICINE

## 2025-05-12 PROCEDURE — 82374 ASSAY BLOOD CARBON DIOXIDE: CPT | Performed by: EMERGENCY MEDICINE

## 2025-05-12 PROCEDURE — 81025 URINE PREGNANCY TEST: CPT | Performed by: EMERGENCY MEDICINE

## 2025-05-12 PROCEDURE — 84443 ASSAY THYROID STIM HORMONE: CPT | Performed by: EMERGENCY MEDICINE

## 2025-05-12 RX ORDER — EPINEPHRINE 0.3 MG/.3ML
0.3 INJECTION SUBCUTANEOUS DAILY PRN
Status: DISCONTINUED | OUTPATIENT
Start: 2025-05-12 | End: 2025-05-21 | Stop reason: HOSPADM

## 2025-05-12 RX ORDER — AMOXICILLIN 250 MG
1 CAPSULE ORAL 2 TIMES DAILY PRN
Status: DISCONTINUED | OUTPATIENT
Start: 2025-05-12 | End: 2025-05-21 | Stop reason: HOSPADM

## 2025-05-12 RX ORDER — OLANZAPINE 10 MG/1
10 TABLET, FILM COATED ORAL 3 TIMES DAILY PRN
Status: DISCONTINUED | OUTPATIENT
Start: 2025-05-12 | End: 2025-05-21 | Stop reason: HOSPADM

## 2025-05-12 RX ORDER — HYDROXYZINE HYDROCHLORIDE 25 MG/1
25 TABLET, FILM COATED ORAL EVERY 4 HOURS PRN
Status: DISCONTINUED | OUTPATIENT
Start: 2025-05-12 | End: 2025-05-21 | Stop reason: HOSPADM

## 2025-05-12 RX ORDER — HYDROXYZINE HYDROCHLORIDE 25 MG/1
25-50 TABLET, FILM COATED ORAL 3 TIMES DAILY PRN
Status: DISCONTINUED | OUTPATIENT
Start: 2025-05-12 | End: 2025-05-12 | Stop reason: ALTCHOICE

## 2025-05-12 RX ORDER — OLANZAPINE 10 MG/2ML
10 INJECTION, POWDER, FOR SOLUTION INTRAMUSCULAR 3 TIMES DAILY PRN
Status: DISCONTINUED | OUTPATIENT
Start: 2025-05-12 | End: 2025-05-21 | Stop reason: HOSPADM

## 2025-05-12 RX ORDER — TRAZODONE HYDROCHLORIDE 50 MG/1
50 TABLET ORAL
Status: DISCONTINUED | OUTPATIENT
Start: 2025-05-12 | End: 2025-05-21 | Stop reason: HOSPADM

## 2025-05-12 RX ORDER — POLYETHYLENE GLYCOL 3350 17 G
2 POWDER IN PACKET (EA) ORAL
Status: DISCONTINUED | OUTPATIENT
Start: 2025-05-12 | End: 2025-05-19

## 2025-05-12 RX ORDER — OLANZAPINE 10 MG/1
10 TABLET, FILM COATED ORAL AT BEDTIME
Status: DISCONTINUED | OUTPATIENT
Start: 2025-05-12 | End: 2025-05-21 | Stop reason: HOSPADM

## 2025-05-12 RX ORDER — OLANZAPINE 10 MG/1
10 TABLET, ORALLY DISINTEGRATING ORAL ONCE
Status: COMPLETED | OUTPATIENT
Start: 2025-05-12 | End: 2025-05-12

## 2025-05-12 RX ORDER — LOSARTAN POTASSIUM 25 MG/1
50 TABLET ORAL DAILY
Status: DISCONTINUED | OUTPATIENT
Start: 2025-05-13 | End: 2025-05-20

## 2025-05-12 RX ORDER — ACETAMINOPHEN 325 MG/1
650 TABLET ORAL EVERY 4 HOURS PRN
Status: DISCONTINUED | OUTPATIENT
Start: 2025-05-12 | End: 2025-05-21 | Stop reason: HOSPADM

## 2025-05-12 RX ORDER — MAGNESIUM HYDROXIDE/ALUMINUM HYDROXICE/SIMETHICONE 120; 1200; 1200 MG/30ML; MG/30ML; MG/30ML
30 SUSPENSION ORAL EVERY 4 HOURS PRN
Status: DISCONTINUED | OUTPATIENT
Start: 2025-05-12 | End: 2025-05-21 | Stop reason: HOSPADM

## 2025-05-12 RX ADMIN — OLANZAPINE 10 MG: 10 TABLET, ORALLY DISINTEGRATING ORAL at 12:13

## 2025-05-12 RX ADMIN — NICOTINE POLACRILEX 2 MG: 2 LOZENGE ORAL at 18:11

## 2025-05-12 RX ADMIN — OLANZAPINE 10 MG: 10 TABLET, FILM COATED ORAL at 21:16

## 2025-05-12 RX ADMIN — NICOTINE POLACRILEX 2 MG: 2 LOZENGE ORAL at 11:54

## 2025-05-12 ASSESSMENT — ACTIVITIES OF DAILY LIVING (ADL)
ADLS_ACUITY_SCORE: 31
ADLS_ACUITY_SCORE: 51
ADLS_ACUITY_SCORE: 57
ADLS_ACUITY_SCORE: 51
ADLS_ACUITY_SCORE: 51

## 2025-05-12 ASSESSMENT — COLUMBIA-SUICIDE SEVERITY RATING SCALE - C-SSRS
6. HAVE YOU EVER DONE ANYTHING, STARTED TO DO ANYTHING, OR PREPARED TO DO ANYTHING TO END YOUR LIFE?: YES
2. HAVE YOU ACTUALLY HAD ANY THOUGHTS OF KILLING YOURSELF IN THE PAST MONTH?: YES
5. HAVE YOU STARTED TO WORK OUT OR WORKED OUT THE DETAILS OF HOW TO KILL YOURSELF? DO YOU INTEND TO CARRY OUT THIS PLAN?: YES
1. IN THE PAST MONTH, HAVE YOU WISHED YOU WERE DEAD OR WISHED YOU COULD GO TO SLEEP AND NOT WAKE UP?: YES
3. HAVE YOU BEEN THINKING ABOUT HOW YOU MIGHT KILL YOURSELF?: YES
4. HAVE YOU HAD THESE THOUGHTS AND HAD SOME INTENTION OF ACTING ON THEM?: NO
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
## 2025-05-12 NOTE — ED NOTES
IP MH Referral Acuity Rating Score (RARS)    LMHP complete at referral to IP MH, with DEC; and, daily while awaiting IP MH placement. Call score to PPS.  CRITERIA SCORING   New 74 HH and Involuntary for IP MH (not adolescent) 0/3   Boarding over 24 hours 0/1   Vulnerable adult at least 55+ with multiple co morbidities; or, Patient age 11 or under 0/1   Suicide ideation without relief of precipitating factors 1/1   Current plan for suicide 1/1   Current plan for homicide 0/1   Imminent risk or actual attempt to seriously harm another without relief of factors precipitating the attempt 1/1   Severe dysfunction in daily living (ex: complete neglect for self care, extreme disruption in vegetative function, extreme deterioration in social interactions) 1/1   Recent (last 2 weeks) or current physical aggression in the ED 0/1   Restraints or seclusion episode in ED 0/1   Verbal aggression, agitation, yelling, etc., while in the ED 1/1   Active psychosis with psychomotor agitation or catatonia 1/1   Need for constant or near constant redirection (from leaving, from others, etc).  0/1   Intrusive or disruptive behaviors 1/1   TOTAL 7
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
## 2025-05-12 NOTE — ED TRIAGE NOTES
EMS called to hotel in [place] where the patient was acting abnormally. Pt reports being off medications for a few months due to them making her feel tired. Pt is having auditory hallucinations telling her to harm herself. She is having suicidal ideation with a plan to jump off a bridge.
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
## 2025-05-12 NOTE — ED NOTES
ED Shift Note:  Pt has used the bathroom three times in the last half an hour, continues to pace the halls speaking to staff and other pts. While she was using the restroom she was heard talking to herself and laughing. Pt has been asking for nicotine lozenge and anxiety medications from everyone who passes.

## 2025-05-13 PROCEDURE — 250N000013 HC RX MED GY IP 250 OP 250 PS 637: Performed by: PSYCHIATRY & NEUROLOGY

## 2025-05-13 PROCEDURE — 124N000002 HC R&B MH UMMC

## 2025-05-13 PROCEDURE — 250N000013 HC RX MED GY IP 250 OP 250 PS 637: Performed by: NURSE PRACTITIONER

## 2025-05-13 PROCEDURE — 93005 ELECTROCARDIOGRAM TRACING: CPT

## 2025-05-13 PROCEDURE — 250N000013 HC RX MED GY IP 250 OP 250 PS 637: Performed by: EMERGENCY MEDICINE

## 2025-05-13 PROCEDURE — 99207 PR NO BILLABLE SERVICE THIS VISIT: CPT | Performed by: NURSE PRACTITIONER

## 2025-05-13 PROCEDURE — 99222 1ST HOSP IP/OBS MODERATE 55: CPT | Mod: AI | Performed by: PSYCHIATRY & NEUROLOGY

## 2025-05-13 RX ORDER — DIPHENHYDRAMINE HCL 50 MG
50 CAPSULE ORAL EVERY 8 HOURS PRN
Status: DISCONTINUED | OUTPATIENT
Start: 2025-05-13 | End: 2025-05-21 | Stop reason: HOSPADM

## 2025-05-13 RX ORDER — LORAZEPAM 2 MG/1
2 TABLET ORAL EVERY 8 HOURS PRN
Status: DISCONTINUED | OUTPATIENT
Start: 2025-05-13 | End: 2025-05-15

## 2025-05-13 RX ORDER — HALOPERIDOL 5 MG/1
5 TABLET ORAL 2 TIMES DAILY
Status: DISCONTINUED | OUTPATIENT
Start: 2025-05-13 | End: 2025-05-21 | Stop reason: HOSPADM

## 2025-05-13 RX ORDER — LORAZEPAM 2 MG/ML
2 INJECTION INTRAMUSCULAR EVERY 8 HOURS PRN
Status: DISCONTINUED | OUTPATIENT
Start: 2025-05-13 | End: 2025-05-15

## 2025-05-13 RX ORDER — CLONIDINE HYDROCHLORIDE 0.1 MG/1
0.1 TABLET ORAL ONCE
Status: COMPLETED | OUTPATIENT
Start: 2025-05-13 | End: 2025-05-13

## 2025-05-13 RX ORDER — HALOPERIDOL 5 MG/ML
5 INJECTION INTRAMUSCULAR EVERY 8 HOURS PRN
Status: DISCONTINUED | OUTPATIENT
Start: 2025-05-13 | End: 2025-05-21 | Stop reason: HOSPADM

## 2025-05-13 RX ORDER — DIPHENHYDRAMINE HYDROCHLORIDE 50 MG/ML
50 INJECTION, SOLUTION INTRAMUSCULAR; INTRAVENOUS EVERY 8 HOURS PRN
Status: DISCONTINUED | OUTPATIENT
Start: 2025-05-13 | End: 2025-05-21 | Stop reason: HOSPADM

## 2025-05-13 RX ORDER — HALOPERIDOL 5 MG/1
5 TABLET ORAL EVERY 8 HOURS PRN
Status: DISCONTINUED | OUTPATIENT
Start: 2025-05-13 | End: 2025-05-21 | Stop reason: HOSPADM

## 2025-05-13 RX ADMIN — LOSARTAN POTASSIUM 50 MG: 25 TABLET, FILM COATED ORAL at 11:21

## 2025-05-13 RX ADMIN — HYDROXYZINE HYDROCHLORIDE 25 MG: 25 TABLET, FILM COATED ORAL at 14:57

## 2025-05-13 RX ADMIN — CLONIDINE HYDROCHLORIDE 0.1 MG: 0.1 TABLET ORAL at 22:03

## 2025-05-13 RX ADMIN — OLANZAPINE 10 MG: 10 TABLET, FILM COATED ORAL at 11:22

## 2025-05-13 RX ADMIN — HALOPERIDOL 5 MG: 5 TABLET ORAL at 21:35

## 2025-05-13 RX ADMIN — NICOTINE POLACRILEX 2 MG: 2 LOZENGE ORAL at 22:34

## 2025-05-13 RX ADMIN — NICOTINE POLACRILEX 2 MG: 2 LOZENGE ORAL at 12:19

## 2025-05-13 RX ADMIN — OLANZAPINE 10 MG: 10 TABLET, FILM COATED ORAL at 21:35

## 2025-05-13 ASSESSMENT — ACTIVITIES OF DAILY LIVING (ADL)
DRESS: INDEPENDENT;SCRUBS (BEHAVIORAL HEALTH)
ADLS_ACUITY_SCORE: 40
ADLS_ACUITY_SCORE: 40
HYGIENE/GROOMING: INDEPENDENT
ADLS_ACUITY_SCORE: 31
ADLS_ACUITY_SCORE: 40
ADLS_ACUITY_SCORE: 31
ORAL_HYGIENE: INDEPENDENT
ADLS_ACUITY_SCORE: 40
ORAL_HYGIENE: INDEPENDENT
HYGIENE/GROOMING: INDEPENDENT
ADLS_ACUITY_SCORE: 31
ADLS_ACUITY_SCORE: 31
ADLS_ACUITY_SCORE: 40
ADLS_ACUITY_SCORE: 31
LAUNDRY: WITH SUPERVISION
DRESS: INDEPENDENT;SCRUBS (BEHAVIORAL HEALTH)
ADLS_ACUITY_SCORE: 31
ADLS_ACUITY_SCORE: 40
ADLS_ACUITY_SCORE: 31
LAUNDRY: WITH SUPERVISION
ADLS_ACUITY_SCORE: 31
ADLS_ACUITY_SCORE: 31

## 2025-05-13 NOTE — TELEPHONE ENCOUNTER
R: 5:40 PM paged Dr Carney    6:40 PM repaged provider Rommel    7:05 PM Pt presented to Dr Carney who accepts pt for unit 30/[facility]    7:10 PM pt added to unit queue, unit called with disposition    7: 40 PM ED updated with pt placement
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
## 2025-05-13 NOTE — PLAN OF CARE
"   05/13/25 1519   Individualization/Patient Specific Goals   Patient Personal Strengths expressive of emotions;expressive of needs;humor;resilient;resourceful   Patient Vulnerabilities history of unsuccessful treatment;housing insecurity;limited social skills;poor impulse control;substance abuse/addiction;traumatic event   Anxieties, Fears or Concerns Patient is irritable with staff.   Individualized Care Needs Medication management, individual therapy, group therapy, support from unit staff.   Patient/Family-Specific Goals (Include Timeframe) Stabilize and discharge TBD   Interprofessional Rounds   Summary Patient is transgender male with history of Polysubstance Use Disorder, JOHANNA, MDD, Schizoaffective Disorder.  Patient admitted to [facility] on 5/12/2025 from ED.  EMS transported them from a hotel in [place] after staff reported bizarre behavior.  They deny self harm and substance use.  UTox screen was negative.  Patient prefers male staff.  Patient has history of commitment in 2011.  Patient also has history of Mom being Guardian.  On the unit patient has been irritable with staff and nurses saying \"I don't want to talk to you.\"   Participants nursing;OT;psychiatrist;CTC;other (see comments);pharmacy  (PA Student)   Behavioral Team Discussion   Participants Arturo Lafleur MD; CARL Looney; Gina Casalenda, RN; Daina Ibarra OT; PA Student; Pharmacy   Progress Patient just arrived and is irritable with staff.   Anticipated length of stay 3-5 days   Continued Stay Criteria/Rationale Stabilization   Medical/Physical None reported   Precautions None   Plan Stabilize and discharge TBD   Rationale for change in precautions or plan No changes   Safety Plan Safety plan to be completed by unit psychotherapist.   Anticipated Discharge Disposition other (see comments)  (TBD)     PRECAUTIONS AND SAFETY    Behavioral Orders   Procedures    Assault precautions    Code 1 - Restrict to Unit    Routine Programming "     As clinically indicated    Status 15     Every 15 minutes.    Suicide precautions: Suicide Risk: HIGH     Patients on Suicide Precautions should have a Combination Diet ordered that includes a Diet selection(s) AND a Behavioral Tray selection for Safe Tray - with utensils, or Safe Tray - NO utensils       Suicide Risk:   HIGH       Safety  Safety WDL: WDL  Patient Location: hallway, dining room, patient room, own  Observed Behavior: angry/hostile, sitting, sleeping, walking  Observed Behavior (Comment): Resting in bed  Safety Measures: safety rounds completed  Diversional Activity: other (see comments) (Resting in bed)  Suicidality: Status 15, Behavioral scrubs (pajamas), Minimal furniture in room, Minimal personal belongings in room, Completion of personal safety plan, Unpredictable frequency of checking on patient, Optimize communication / relationship to minimize opportunity for self-harm, Promote patient engagement with treatment process, Identify and strengthen protective factors

## 2025-05-13 NOTE — PROGRESS NOTES
"Pt approached writer in the [facility] area.   Pt stated, \"This bitch makes me want to strangle her\"-pointing and referring to a female psych associate.   Writer reinforced appropriate unit behavior expectations and discouraged violent behavior. Pt smiled in response and said \"ok.\"  She walked away, down the montero, talking to herself. Appears responding to internal stimuli.     Targeted staff, primary RN and Charge RN notified of pts threatening statements.   "

## 2025-05-13 NOTE — PLAN OF CARE
"Goal Outcome Evaluation:    Patient has been sleeping since the start of the shift, observed sleeping during 15 minutes check. Patient is new admission from yesterday evening, transgender male who uses they/them pronouns. Pt has hx of polysubstance use disorder and her preferred name is \" Max\".  Patient slept a total of 7hrs        "

## 2025-05-13 NOTE — PLAN OF CARE
Pt has not attended scheduled occupational therapy sessions. Continue to assess patient's readiness for groups and encourage attendance and participation.

## 2025-05-13 NOTE — PLAN OF CARE
Patient is a 44-year-old transgender male who was admitted to [facility] from the emergency department due to auditory hallucination. Per ED report and chart review patient was picked up by EMS at a hotel in [place] where they were acting abnormally, they been off medication for few months because medications were making them feel tired. Complain of command hallucinations telling them to harm themselves but they made no attempts at self-harm. They also endorses SI with plan to jump off a bridge. Patient was compliant with unit search and prefer a male staff, patient oriented to unit rules and policy. Patient is alert and oriented x 4, no complain of pain and discomfort, communicate needs appropriately. Patient affect flat with calm mood, endorses anxiety 3/10 and depression 3/10, had no aggressive or assaultive behavior. Patient reported being very tired, denies SI/SIB/HI and hallucination, contract for safety and medication compliant. Patient do have history of polysubstance abuse(Meth, opiates and alcohol), ADHD, JOHANNA, MDD, OCD, BPD and schizoaffective/schizophrenia. Patient also has hx of frequent ED visit.
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
## 2025-05-13 NOTE — ED NOTES
Pt aware, station 30 is a locked down unit and no personal cell use on the unit. Pt agrees to terms and policy.

## 2025-05-13 NOTE — PLAN OF CARE
BEH IP Unit Acuity Rating Score (UARS)  Patient is given one point for every criteria they meet.    CRITERIA SCORING   On a 72 hour hold, court hold, committed, stay of commitment, or revocation. 0    Patient LOS on BEH unit exceeds 20 days. 0  LOS: 1   Patient under guardianship, 55+, otherwise medically complex, or under age 11. 0   Suicide ideation without relief of precipitating factors. 1   Current plan for suicide. 0   Current plan for homicide. 0   Imminent risk or actual attempt to seriously harm another without relief of factors precipitating the attempt. 0   Severe dysfunction in daily living (ex: complete neglect for self care, extreme disruption in vegetative function, extreme deterioration in social interactions). 1   Recent (last 7 days) or current physical aggression in the ED or on unit. 0   Restraints or seclusion episode in past 72 hours. 0   Recent (last 7 days) or current verbal aggression, agitation, yelling, etc., while in the ED or unit. 1   Active psychosis. 1   Need for constant or near constant redirection (from leaving, from others, etc).  0   Intrusive or disruptive behaviors. 0   Patient requires 3 or more hours of individualized nursing care per 8-hour shift (i.e. for ADLs, meds, therapeutic interventions). 0   TOTAL 4

## 2025-05-13 NOTE — PLAN OF CARE
Goal Outcome Evaluation:     05/12/25 9915   Patient Belongings   Did you bring any home meds/supplements to the hospital?  No   Patient Belongings locker;sent to security per site process   Patient Belongings Put in Hospital Secure Location (Security or Locker, etc.) cash/credit card;cell phone/electronics;clothing;money (see comment);plastic bag;purse/wallet;shoes;wallet   Belongings Search Yes   Clothing Search Yes   Second Staff Sam (RN)   Comment see note     In locker: 1 jacket, 3 sweat shirts, 2 shorts, 2 t-shirts, 1 underwear, 5 pair of socks, 1 pair of shoes, 1 sun glasses, 1 hat, 1 necklace, a few vapes and wallet/state ID/health insurance card.    Sent to security: visa card #[id number], visa card, #[id number], visa card #[id number], EBT card #[id number], visa card #[id number] and $4272 ( four thousand two hundred seventy two)  $50-$1100                                                                                                                                                                                                                                                                                                                                                                                                                                                                                                                                                      $20- $3160  $5- $10  $1 - $2     A               Admission:  I am responsible for any personal items that are not sent to the safe or pharmacy.  [facility] is not responsible for loss, theft or damage of any property in my possession.    Signature:  _________________________________ Date: _______  Time: _____                                              Staff Signature:  ____________________________ Date: ________  Time: _____      2nd Staff person, if patient is unable/unwilling to sign:    Signature: ________________________________ Date: ________  Time: _____      Discharge:  [facility] has returned all of my personal belongings:    Signature: _________________________________ Date: ________  Time: _____                                          Staff Signature:  ____________________________ Date: ________  Time: _____

## 2025-05-13 NOTE — PLAN OF CARE
"  Problem: Adult Inpatient Plan of Care  Goal: Optimal Comfort and Wellbeing  Outcome: Progressing     Problem: Adult Behavioral Health Plan of Care  Goal: Adheres to Safety Considerations for Self and Others  Outcome: Progressing  Flowsheets (Taken 5/13/2025 1148)  Adheres to Safety Considerations for Self and Others: making progress toward outcome     Brief shift overview: Pt visible on unit attending meals and to occasionally refill cup of water and request PRN nicotine lozenge; however, pt otherwise spent majority of the shift resting in bed. Pt did not attend group this shift. Pt keeps to themself when visible on unit and does not engage with peers. Pt did make various comments to staff today, including that she wanted to \"strangle\" a psych associate per co-RN, see previous RN note regarding this. One psych associates also reported pt told she is a \"worthless bitch.\" Another psych associate reports pt stated \"go back to where you're from\" and also called the psych associate a \"bitch\" when she wanted her lunch tray. Pt redirected and visible resting in their bed shortly afterward. Provider and Charge RN updated.     Medications: Pt initially refused VS and scheduled losartan; however, shortly after 1100, pt approached writer and asked for her medication. Writer asked if they would allow VS first as it is an antihypertensive. Pt agreed; however, appeared to be very irritable that they had to get VS taken. Pt received scheduled losartan after VS taken. Pt asking about concerta; however, provider reports he will not be ordered medication for pt. Writer also spoke to unit pharmacist who reports pt does not have a current/active prescription for this and did not see it in PDMP. Pt will be started scheduled haldol this evening - see eMAR for further details. Pt received the following PRN(s) this shift: zyprexa, nicotine lozenge, atarax. No medication SE observed or reported. Pt aware they have PRN atarax available, if " "needed.     Medical Issues: HTN - pt's /120 - pt observed to be agitated when having BP taken. Pt also reported feeling anxious when having BP taken. Pt did report that this is \"normal\" BP for them. Pt asymptomatic. Pt given scheduled losartan. Provider updated. Writer attempted to recheck BP; however, pt refused. Pt appears to be resting comfortably in bed this afternoon. Around 1425, co-RN able to recheck pt's BP as pt refused to allow writer to. BP recheck 178/116. Pt observed to be irritable and appears uncomfortable, despite denying pain and symptoms. Writer paged provider and IM consult ordered. Writer also paged IM to update. Writer spoke w/IM and shortly afterward, writer rechecked pt's BP again per IM request. BP recheck 156/108 while pt lying in bed. Pt continues to deny pain. They also continue to endorse anxiety. Pt is now saying they have \"a little chest tightness.\" Writer updated IM and STAT EKG and troponin lab ordered. EKG done at change of shift - see results for further details. Oncoming RN notified. Pt refused troponin lab as well. Oncoming RN notified of this as well. Pt informed of this. Pt also given PRN for anxiety as well. Writer did ask pt if they took any benzodiazepines recently or any other substances, as pt asked writer and co-RN for benzos this afternooon. Pt denies any use. Writer informed pt that UDS was negative and reassured pt that writer just wants to ensure pt is not going through withdrawal and that if there has been any recent use of any substances that it is okay to let writer know. Pt appeared understanding of this and again denies any recent benzodiazepine use or any other substances. Writer thanked pt for answering writer's questions and encouraged them to notify writer or nursing staff if they develop any additional symptoms. Pt acknowledged an understanding of this. Pt visible shortly afterward asleep in their bed. Writer will update oncoming shift as well. Safety " "checks done every fifteen minutes per unit policy. Pt denies pain this shift. No other medical concerns observed or reported this shift.     Mental Health: Pt endorses anxiety and depression but unable to rate. Pt endorses SI/SIB thoughts but denies plan/method/intent while in the hospital. Pt able to contract for safety within the hospital. Pt reports they would not be safe if they were discharged; however, declined to elaborate or answer further questions as to whether they have a plan/method/intent for outside of the hospital. Pt denies HI/VH. Pt endorses AH and reports the voices are constant and bothersome. They report that they have \"no quality of life\" and \"want to die\" and report it is \"because of the voices.\" When writer attempted to talk to pt about if they were taking medications before hospitalization and what medications worked or did not work in the past, pt shouted, \"how about you look in my fucking chart!\" Writer attempted to talk about what led to pt's admission. Pt reported they were living at the [facility] in [place]. When writer asked how long they had been staying there, pt shouted, \"none of you fucking business!\" Pt observed to be dismissive with assessment questions. Pt observed to be guarded, hostile, agitated, irritable, tense, and intermittently uncooperative. Pt has moments where they are calm and cooperative. Pt observed to be isolative and withdrawn. Pt's affect observed to be blunted and labile. Provider reports he will place no roommate order as pt is not appropriate for a roommate at this time. They remain on suicide precautions. Pt started on assault precautions due to comments made to staff this shift, as mentioned above. Pt denies any other questions or concerns at this time. Will continue to monitor and assist as needed.     Recommendations to oncoming staff:  Pt prefers to go by \"Cam.\" They/them pronouns.  Pt prefers male staff per previous report.   Pt has fasting labs ordered for " "tomorrow - please remind pt of this, as well as NOC shift.   Continue to monitor VS and if pt develops any new symptoms, please notify IM.   Provider will be placing a no roommate order and also adding parameters to scheduled losartan.   Pt has PRN zyprexa and scheduled zyprexa. Please be mindful of this to ensure pt does not exceed 30 mg in 24 hours. Per provider, please alternate PRN zyprexa with PRN benadryl, ativan, haldol order. See administration instructions for further details.   Continue to support pt's plan of care.     BP (!) 168/120 (BP Location: Left arm, Patient Position: Supine)   Pulse 75   Temp 98  F (36.7  C) (Temporal)   Resp 16   Ht 1.626 m (5' 4\")   Wt 76.2 kg (168 lb)   LMP  (LMP Unknown)   SpO2 97%   BMI 28.84 kg/m      "

## 2025-05-14 LAB
ATRIAL RATE - MUSE: 73 BPM
DIASTOLIC BLOOD PRESSURE - MUSE: NORMAL MMHG
INTERPRETATION ECG - MUSE: NORMAL
P AXIS - MUSE: 6 DEGREES
PR INTERVAL - MUSE: 126 MS
QRS DURATION - MUSE: 96 MS
QT - MUSE: 380 MS
QTC - MUSE: 418 MS
R AXIS - MUSE: 32 DEGREES
SYSTOLIC BLOOD PRESSURE - MUSE: NORMAL MMHG
T AXIS - MUSE: 2 DEGREES
VENTRICULAR RATE- MUSE: 73 BPM

## 2025-05-14 PROCEDURE — 250N000013 HC RX MED GY IP 250 OP 250 PS 637: Performed by: PSYCHIATRY & NEUROLOGY

## 2025-05-14 PROCEDURE — 124N000002 HC R&B MH UMMC

## 2025-05-14 PROCEDURE — 250N000013 HC RX MED GY IP 250 OP 250 PS 637: Performed by: EMERGENCY MEDICINE

## 2025-05-14 PROCEDURE — 97150 GROUP THERAPEUTIC PROCEDURES: CPT | Mod: GO

## 2025-05-14 PROCEDURE — 99232 SBSQ HOSP IP/OBS MODERATE 35: CPT | Performed by: PSYCHIATRY & NEUROLOGY

## 2025-05-14 PROCEDURE — 99221 1ST HOSP IP/OBS SF/LOW 40: CPT

## 2025-05-14 RX ORDER — VENLAFAXINE HYDROCHLORIDE 75 MG/1
75 CAPSULE, EXTENDED RELEASE ORAL
Status: DISCONTINUED | OUTPATIENT
Start: 2025-05-15 | End: 2025-05-21 | Stop reason: HOSPADM

## 2025-05-14 RX ADMIN — OLANZAPINE 10 MG: 10 TABLET, FILM COATED ORAL at 22:30

## 2025-05-14 RX ADMIN — NICOTINE POLACRILEX 2 MG: 2 LOZENGE ORAL at 13:06

## 2025-05-14 RX ADMIN — LOSARTAN POTASSIUM 50 MG: 25 TABLET, FILM COATED ORAL at 09:58

## 2025-05-14 RX ADMIN — HYDROXYZINE HYDROCHLORIDE 25 MG: 25 TABLET, FILM COATED ORAL at 09:58

## 2025-05-14 RX ADMIN — HALOPERIDOL 5 MG: 5 TABLET ORAL at 09:58

## 2025-05-14 RX ADMIN — ACETAMINOPHEN 650 MG: 325 TABLET ORAL at 09:58

## 2025-05-14 RX ADMIN — NICOTINE POLACRILEX 2 MG: 2 LOZENGE ORAL at 09:58

## 2025-05-14 RX ADMIN — NICOTINE POLACRILEX 2 MG: 2 LOZENGE ORAL at 21:00

## 2025-05-14 RX ADMIN — NICOTINE POLACRILEX 2 MG: 2 LOZENGE ORAL at 18:49

## 2025-05-14 RX ADMIN — NICOTINE POLACRILEX 2 MG: 2 LOZENGE ORAL at 20:00

## 2025-05-14 RX ADMIN — OLANZAPINE 10 MG: 10 TABLET, FILM COATED ORAL at 13:06

## 2025-05-14 RX ADMIN — DIPHENHYDRAMINE HYDROCHLORIDE 50 MG: 50 CAPSULE ORAL at 18:44

## 2025-05-14 RX ADMIN — HALOPERIDOL 5 MG: 5 TABLET ORAL at 18:44

## 2025-05-14 RX ADMIN — LORAZEPAM 2 MG: 2 TABLET ORAL at 18:44

## 2025-05-14 RX ADMIN — HALOPERIDOL 5 MG: 5 TABLET ORAL at 22:30

## 2025-05-14 ASSESSMENT — ACTIVITIES OF DAILY LIVING (ADL)
ADLS_ACUITY_SCORE: 60
ADLS_ACUITY_SCORE: 40
DRESS: INDEPENDENT;SCRUBS (BEHAVIORAL HEALTH);PROMPTS
ADLS_ACUITY_SCORE: 60
ORAL_HYGIENE: INDEPENDENT;PROMPTS
ADLS_ACUITY_SCORE: 40
ADLS_ACUITY_SCORE: 60
ADLS_ACUITY_SCORE: 40
ADLS_ACUITY_SCORE: 40
LAUNDRY: UNABLE TO COMPLETE
ADLS_ACUITY_SCORE: 40
ADLS_ACUITY_SCORE: 40
HYGIENE/GROOMING: INDEPENDENT;PROMPTS
ADLS_ACUITY_SCORE: 60
ADLS_ACUITY_SCORE: 40
ADLS_ACUITY_SCORE: 40
ADLS_ACUITY_SCORE: 60
ADLS_ACUITY_SCORE: 40
ADLS_ACUITY_SCORE: 40
ADLS_ACUITY_SCORE: 60

## 2025-05-14 NOTE — PLAN OF CARE
"Goal Outcome Evaluation    Pt had an uneventful shift and slept until 2130 refusing to engage with staff for check ins until they were completely up. They took all scheduled medication and denied all ME's. Towards the end of the shifted reported \"some\" anxiety then requested PRN ativan for severe agitation. Presents with a blunted affect and a calm mood.  Denies all other mental health s/s including SI,HI and SIB. Contracts for safety. Denies pain. Only ate a nutrigrain bar and 2 cups of water. Hygiene is poor.     Medical concerns: Pt was hypertensive and took 0.1 mg clonidine but declined a recheck. They are asymptomatic and dismissive with assessment questions. Also refused lab draw twice.    BP (!) 178/85   Pulse 74   Temp 98  F (36.7  C) (Temporal)   Resp 16   Ht 1.626 m (5' 4\")   Wt 76.2 kg (168 lb)   LMP  (LMP Unknown)   SpO2 98%   BMI 28.84 kg/m         Last 24H PRN:     hydrOXYzine HCl (ATARAX) tablet 25 mg, 25 mg at 05/13/25 1457    nicotine (COMMIT) lozenge 2 mg, 2 mg at 05/13/25 2234    OLANZapine (zyPREXA) tablet 10 mg, 10 mg at 05/13/25 1122 **OR** OLANZapine (zyPREXA) injection 10 mg   "

## 2025-05-14 NOTE — PLAN OF CARE
"Pt declined labs this morning. Per report received from NOC RN, pt refused labs on evening shift as well. Writer will inform provider of this. Pt informed breakfast was here. Pt visible on unit around 0850 eating breakfast. Pt declined VS this morning and per psych associate, said that they \"are not ready\" yet for VS. Writer will re-attempt VS and medication again later unless pt approaches writer and tells writer they are ready before then. Writer did inform pt that IM would be seeing them this morning. Pt acknowledged an understanding of this. Will continue to monitor and assist as needed.   "

## 2025-05-14 NOTE — PLAN OF CARE
"  Rehab Group    Start time: 13:15  End time: 14:00  Patient time total: 10 minutes    attended partial group    #7 attended   Group Type: occupational therapy   Group Topic Covered: self-esteem and social skills, values identification, creative self-expression      Group Session Detail:  Patient engaged in an interactive group focused on self identity. The therapeutic benefits of this Occupational Therapy group include promoting values exploration, identification of personal traits, and self-expression. Participants in this group were engaged in a group discussion pertaining to personal identity and values. After, participants were instructed to complete a personal identity crest and share final products with peers.      Patient Response/Contribution:  cooperative with task, organized, and attentive, worked briefly, left group early      Patient Detail:  Patient Response: Patient arrived to the group late. Upon approach, patient introduced themself appropriately to this writer and selected a personal crest template to complete. Patient completed their personal crest in an organized and goal-oriented manner. However, patient left group after roughly ten minutes of work on their personal crest. Patient was, however, observed to write \"Deziel\" on their crest with some included designs. Patient did not return to group after leaving early. Affect appeared congruent with the activity. Patient remained calm and cooperative throughout the duration of this group.        [id number] OT Group (2 or more in attendance)      Patient Active Problem List   Diagnosis    CARDIOVASCULAR SCREENING; LDL GOAL LESS THAN 160    Cholecystitis    Suicidal ideation    Auditory hallucinations    Depression with anxiety    Schizoaffective disorder, depressive type (H)    ADHD (attention deficit hyperactivity disorder), inattentive type    Polysubstance use disorder    MDD (major depressive disorder)    Other migraine without status migrainosus, not " intractable    Depression, unspecified depression type    Abnormal EKG    Anxiety    Dental caries    Bulimia nervosa (H)    Elevated blood pressure reading    Family history of hypertension    History of methamphetamine use    Hyperlipidemia    Hypertension    Microcytic anemia    Nicotine dependence    Opioid use disorder in remission    Body dysmorphic disorder    Depression, major, recurrent    Schizoaffective disorder (H)    Encounter for monitoring opioid maintenance therapy    Schizophrenia (H)    Methamphetamine use (H)    Stimulant use disorder    Opioid use disorder    Methamphetamine use disorder, moderate (H)    Stimulant-induced psychotic disorder (H)    Alcohol dependence (H)    Suicidal thoughts    Alcohol use disorder    Borderline personality disorder (H)    Malingering    Unsheltered homelessness    Homelessness    Psychosis (H)    Acute psychosis (H)

## 2025-05-14 NOTE — PLAN OF CARE
"Team Note Due:  Wednesday     Assessment/Intervention/Current Symtoms and Care Coordination:  Chart review and met with team, discussed pt progress, symptomology, and response to treatment.  Discussed the discharge plan and any potential impediments to discharge.    In team it was discussed that Cam uses \"they/them\" pronouns.  RN reports they spent most of the day resting in bed yesterday.  They did not engage with peers and made threatening comments to staff.  RN reports they said they wanted to \"strangle\" a psych associate.  They also made comments such as \"go back to where you're from\" to staff.  They have been requesting Ativan but have been compliant with scheduled medications.      Cam requested to meet with writer this afternoon.  Met with Cam and they wanted to discuss going to an IRTS.  They said they recently lived in an IRTS in [place].  When asked where they were living prior to hotels they declined to answer.  They said they would only sign DEEDEE for Mom if it was absolutely necessary.  Writer told them she would check in with them tomorrow.       Discharge Plan or Goal:  IRTS     Barriers to Discharge:  Severity of symptoms     Referral Status:  None      Legal Status:  Voluntary      Contacts (include DEEDEE status):  [facility]  [facility]       Upcoming Meetings and Dates/Important Information and next steps:  None     "

## 2025-05-14 NOTE — PROGRESS NOTES
Hospital Medicine Cross Cover Note    May 13, 2025 9:51 PM    I was notified by RN that this patient had hypertension, 170s/80s, feeling anxious.   Note routine consult for hospital med - patient to be seen 5/14 AM unless anything is needed more urgently.      Action taken:   -ordered clonidine for HTN and anxiety  -added hydralazine to PRN meds for later if needed    Communicated plan via secure messaging.     Amanda Dent, CLARA CNP on 5/13/2025 at 9:51 PM

## 2025-05-14 NOTE — PROGRESS NOTES
"[facility], [place]   Psychiatric Progress Note        Interim History:   The patient's care was discussed with the treatment team during the daily team meeting and/or staff's chart notes were reviewed.  Staff report patient slept for 6.75 hours last night. Seen by IM due to concerns of HTN, see recommendations below (appreciate IM's help):     \"Has history of HTN per chart review, was admitted 2/2025 and ARB increased during this hospitalization, discharged on losartan 50 mg daily with a one month supply and plan for PCP follow-up though does not appear that patient has followed up so like out of medications for some time. Restarted on losartan here, first dose on 5/13. Bps elevated but patient asymptomatic, down trending this AM.   - Cont pta losartan 50 mg   - Continue to monitor BP trend- suspect it will greatly improve with re-initation of pta ARB but could consider adding additional antihypertensive in coming days if patient remains hypertensive  - Notify medicine if SBP > 160, DBP > 100 or if patient develops any symptoms with elevated BP including CP, dizziness, HA, vision changes etc.   - Treat pain, anxiety and agitation as able as these can also contribute to HTN  - PCP referral placed\"     Patient with some hesitance took meds, but refused vitals this morning and labs, see RN's note below:     \"Pt declined labs this morning. Per report received from NOC RN, pt refused labs on evening shift as well. Writer will inform provider of this. Pt informed breakfast was here. Pt visible on unit around 0850 eating breakfast. Pt declined VS this morning and per psych associate, said that they \"are not ready\" yet for VS. Writer will re-attempt VS and medication again later unless pt approaches writer and tells writer they are ready before then. Writer did inform pt that IM would be seeing them this morning. Pt acknowledged an understanding of this. Will continue to monitor and assist as " "needed.\"    Met with patient: patient was seen in their room in presence of PA student. Cam was laying in their bed and remained there throughout our whole visit. Was in somewhat better mood and talked to us in more cooperative form than yesterday. Confirmed presence of Auditory hallucinations, stated that voices tell them to commit suicide, but added that they felt safe on this floor. They voiced agreement to take Haldol. Requested to be started on Venlafaxine and Ativan or Klonopin. We told them that we would start them on Effexor, however, not on benzodiazepines because of  their well known hx of addiction. After patient heard that they immediately asked us if our conversation was done and indicated that they had nothing else to say or add. ECG report was reviewed.         Medications:     Current Facility-Administered Medications   Medication Dose Route Frequency Provider Last Rate Last Admin    haloperidol (HALDOL) tablet 5 mg  5 mg Oral BID Arturo Lafleur MD   5 mg at 05/14/25 0958    losartan (COZAAR) tablet 50 mg  50 mg Oral Daily RommelJayant govea MD   50 mg at 05/14/25 0958    OLANZapine (zyPREXA) tablet 10 mg  10 mg Oral At Bedtime Miki Olguin MD   10 mg at 05/13/25 6558          Allergies:     Allergies   Allergen Reactions    Bee Venom Anaphylaxis     14 year's ago anaphylaxis went to hospital,  Did have positive venom skin testing    Wasp Venom Protein Anaphylaxis    Sulfa Antibiotics Rash          Labs:     Recent Results (from the past 24 hours)   EKG 12-lead, complete    Collection Time: 05/13/25  3:37 PM   Result Value Ref Range    Systolic Blood Pressure  mmHg    Diastolic Blood Pressure  mmHg    Ventricular Rate 73 BPM    Atrial Rate 73 BPM    KY Interval 126 ms    QRS Duration 96 ms     ms    QTc 418 ms    P Axis 6 degrees    R AXIS 32 degrees    T Axis 2 degrees    Interpretation ECG       Sinus rhythm  Minimal voltage criteria for LVH, may be normal variant ( [place] " "product )  Nonspecific T wave abnormality  Abnormal ECG    Unconfirmed report - interpretation of this ECG is computer generated - see medical record for final interpretation            Psychiatric Examination:     BP (!) 138/101 (BP Location: Left arm, Patient Position: Supine)   Pulse 85   Temp 97.9  F (36.6  C) (Temporal)   Resp 16   Ht 1.626 m (5' 4\")   Wt 76.2 kg (168 lb)   LMP  (LMP Unknown)   SpO2 96%   BMI 28.84 kg/m    Weight is 168 lbs 0 oz  Body mass index is 28.84 kg/m .    Orthostatic Vitals         Most Recent      Lying Orthostatic /101 05/14 0958    Lying Orthostatic Pulse (bpm) 85 05/14 0958          Appearance: dressed in hospital scrubs and appeared as age stated  Attitude:  somewhat cooperative  Eye Contact:  fair  Mood:  irritable  Affect:  mood congruent  Speech:  clear, coherent  Psychomotor Behavior:  no evidence of tardive dyskinesia, dystonia, or tics  Throught Process:  linear  Associations:  no loose associations  Thought Content:  auditory hallucinations present  Insight:  limited  Judgement:  limited  Oriented to:  time, person, and place  Attention Span and Concentration:  fair, didn't ask to repeat  Recent and Remote Memory:  fair    Clinical Global Impressions  First: 7/4 5/14/2025      Most recent:            Precautions:     Behavioral Orders   Procedures    Assault precautions    Code 1 - Restrict to Unit    Routine Programming     As clinically indicated    Status 15     Every 15 minutes.    Suicide precautions: Suicide Risk: HIGH     Patients on Suicide Precautions should have a Combination Diet ordered that includes a Diet selection(s) AND a Behavioral Tray selection for Safe Tray - with utensils, or Safe Tray - NO utensils       Suicide Risk:   HIGH          DIagnoses:     Schizoaffective disorder, bipolar; rule chronic paranoid schizophrenia, acute exacerbation; polysubstance abuse; ADHD; JOHANNA; Borderline personality disorder and antisocial personality disorder. "          Plan:     Was started on Haldol yesterday, today 5/14/2025 will add Effexor ER. Patient should not be given benzos due to hx of polysubstance abuse and B52 should not be given routinely as he might ask for it to get Ativan. Plan to send to IRTS when more stable.    Total time spent was 37 minutes. Over 50% of times was spent counseling and coordination of care regarding coping skills, medication and discharge planning.

## 2025-05-14 NOTE — PLAN OF CARE
"  Problem: Adult Inpatient Plan of Care  Goal: Optimal Comfort and Wellbeing  Outcome: Progressing     Problem: Adult Behavioral Health Plan of Care  Goal: Adheres to Safety Considerations for Self and Others  Outcome: Progressing  Flowsheets (Taken 5/14/2025 1255)  Adheres to Safety Considerations for Self and Others: making progress toward outcome     Brief shift overview: Pt visible on unit briefly throughout the shift attending meals and occasionally filling up their water cup. Pt otherwise isolative to their room. Pt spent majority of this shift resting in their bed. Pt did not attend group this shift. Pt keeps to themselves when visible on the unit and does not engage with peers.     Medications: Pt initially refused VS to be taken and scheduled morning meds (see writer's previous note); however, pt did agree for VS to be taken around 0955 when IM came to unit to meet with pt and writer together. Pt received scheduled medications. They deny any medication SE at this time. Pt received the following PRN medications this shift: atarax, zyprexa    Medical Issues: Pt refused scheduled labs again this shift - labs rescheduled for tomorrow. Per provider, will attempt to obtain labs one more time. Pt does have fasting labs tomorrow morning and will need to be NPO overnight. Pt c/o 6/10 pain \"all over\" - PRN tylenol given per pt request. Pt reports that PRN medication ineffective and continues to report pain \"all over.\" Pt declines further pharmacological and nonpharmacological interventions for pain at this time. HTN - pt's /101 lying - pt asymptomatic; however, pt does report anxiety, agitation, and pain, which could be contributing to pt's HTN per IM. See IM note for further details regarding IM consult for HTN this shift. Pt received scheduled losartan around 0958. Pt declined BP recheck - writer attempted x2. Pt does have PRN hydralazine if they meet criteria. Per IM, nursing to notify IM if SBP >160, DBP " ">100 or if pt develops any symptoms with elevated BP (dizziness, HA, vision changes, etc.). No other medical issues observed or reported this shift.     Mental Health: Pt endorses anxiety and depression but is unable to rate at this time - scheduled medication given, along with PRN atarax. Pt endorses thoughts of SI/SIB; however, denies plan/method/intent for within the hospital. Pt reports they would not be able to be safe outside of the hospital; however, declined to elaborate further. Pt contracts for safety within the hospital. Pt denies HI and VH. Pt endorses AH and reports the voices are constant and make them \"want to die.\" Pt reports the voices make them want to kill themself. Pt received scheduled medication, along with PRN zyprexa this shift. Pt reports that the voices are still present. Pt appears unkempt; however, they declined to shower this shift. Pt observed to be guarded, irrational, and demanding at times. Pt observed to be angry, irritable, pessimistic, and tense at times. Pt observed to be hesitant to make eye contact and eye contact observed to be intermittent. Pt observed to be uncooperative at times; however, was more cooperative this shift compared to previous day when writer cared for pt. Pt's affect observed to be blunted and labile. At times, pt observed to be calm; however, intermittently, pt observed to be irritable, agitated, and dismissive. When writer was in room with IM, pt was talking with IM and then stopped and looked near writer but did not make eye contact with writer and shouted, \"you lose!\" and then returned to speaking to IM. Pt stated they wanted ativan and writer reported that pt would not be getting ativan this morning and informed them they have scheduled medications and other PRN medications available. Pt then looked near writer but without eye contact and loudly shouted, \"shut up!\" several times. Writer and IM left the room at this time. Writer returned with scheduled " "medication, along with PRN atarax. Pt throughout the day would ask writer and additional staff for ativan. Pt asked writer, \"do I have to hold somebody up to get it?\" Pt does have PRN benadryl, ativan, and haldol ordered and reports they are aware it is ordered. Writer informed pt that they have other medications that are to be tried prior and that the benadryl, ativan, and haldol is the last option for agitation. Writer also informed pt that writer will not be waking pt up solely to offer additional PRNs. Pt acknowledged an understanding of this. Thought process observed to be preoccupation, perseveration, and hopelessness. Thought process observed to be disorganized and illogical. Pt observed to be isolative and withdrawn. Pt observed to be restless at times. Provider reports they will be placing a no roommate order today. Pt remains on suicide and assault precautions. Pt denies any additional questions or concerns at this time. Will continue to monitor and assist as needed.     Recommendations to oncoming staff:  Pt prefers to go by \"Cam.\" They/them pronouns.  Pt prefers male staff per previous report.  Pt has fasting labs tomorrow - please remind pt of this, as well as NOC shift.   Continue to monitor VS and notify IM if pt's SBP >160 or DBP >100 or if pt's BP is elevated and pt is symptomatic (see IM note).   Pt has PRN zyprexa and scheduled zyprexa. Please be mindful of this to ensure pt does not exceed 30 mg in 24 hours. Per provider, please alternate PRN zyprexa with PRN benadryl, ativan, and haldol order. Pt's PRN zyprexa is first line for agitation.   Continue to support pt's plan of care.     BP (!) 138/101 (BP Location: Left arm, Patient Position: Supine)   Pulse 85   Temp 97.9  F (36.6  C) (Temporal)   Resp 16   Ht 1.626 m (5' 4\")   Wt 76.2 kg (168 lb)   LMP  (LMP Unknown)   SpO2 96%   BMI 28.84 kg/m          "

## 2025-05-14 NOTE — PLAN OF CARE
BEH IP Unit Acuity Rating Score (UARS)  Patient is given one point for every criteria they meet.    CRITERIA SCORING   On a 72 hour hold, court hold, committed, stay of commitment, or revocation. 0    Patient LOS on BEH unit exceeds 20 days. 0  LOS: 2   Patient under guardianship, 55+, otherwise medically complex, or under age 11. 0   Suicide ideation without relief of precipitating factors. 1   Current plan for suicide. 0   Current plan for homicide. 0   Imminent risk or actual attempt to seriously harm another without relief of factors precipitating the attempt. 0   Severe dysfunction in daily living (ex: complete neglect for self care, extreme disruption in vegetative function, extreme deterioration in social interactions). 1   Recent (last 7 days) or current physical aggression in the ED or on unit. 0   Restraints or seclusion episode in past 72 hours. 0   Recent (last 7 days) or current verbal aggression, agitation, yelling, etc., while in the ED or unit. 1   Active psychosis. 1   Need for constant or near constant redirection (from leaving, from others, etc).  0   Intrusive or disruptive behaviors. 0   Patient requires 3 or more hours of individualized nursing care per 8-hour shift (i.e. for ADLs, meds, therapeutic interventions). 0   TOTAL 4

## 2025-05-15 VITALS
BODY MASS INDEX: 28.68 KG/M2 | WEIGHT: 168 LBS | SYSTOLIC BLOOD PRESSURE: 148 MMHG | OXYGEN SATURATION: 96 % | DIASTOLIC BLOOD PRESSURE: 95 MMHG | TEMPERATURE: 97.9 F | RESPIRATION RATE: 17 BRPM | HEIGHT: 64 IN | HEART RATE: 83 BPM

## 2025-05-15 PROCEDURE — 124N000002 HC R&B MH UMMC

## 2025-05-15 PROCEDURE — 250N000013 HC RX MED GY IP 250 OP 250 PS 637: Performed by: PSYCHIATRY & NEUROLOGY

## 2025-05-15 PROCEDURE — 99207 PR NO CHARGE LOS: CPT

## 2025-05-15 PROCEDURE — 99232 SBSQ HOSP IP/OBS MODERATE 35: CPT | Performed by: PSYCHIATRY & NEUROLOGY

## 2025-05-15 PROCEDURE — 250N000013 HC RX MED GY IP 250 OP 250 PS 637: Performed by: EMERGENCY MEDICINE

## 2025-05-15 RX ADMIN — HALOPERIDOL 5 MG: 5 TABLET ORAL at 10:19

## 2025-05-15 RX ADMIN — NICOTINE POLACRILEX 2 MG: 2 LOZENGE ORAL at 11:58

## 2025-05-15 RX ADMIN — VENLAFAXINE HYDROCHLORIDE 75 MG: 75 CAPSULE, EXTENDED RELEASE ORAL at 10:19

## 2025-05-15 RX ADMIN — ACETAMINOPHEN 650 MG: 325 TABLET ORAL at 21:16

## 2025-05-15 RX ADMIN — LOSARTAN POTASSIUM 50 MG: 25 TABLET, FILM COATED ORAL at 10:19

## 2025-05-15 RX ADMIN — HALOPERIDOL 5 MG: 5 TABLET ORAL at 21:16

## 2025-05-15 RX ADMIN — OLANZAPINE 10 MG: 10 TABLET, FILM COATED ORAL at 21:16

## 2025-05-15 RX ADMIN — NICOTINE POLACRILEX 2 MG: 2 LOZENGE ORAL at 10:19

## 2025-05-15 RX ADMIN — ACETAMINOPHEN 650 MG: 325 TABLET ORAL at 10:19

## 2025-05-15 RX ADMIN — NICOTINE POLACRILEX 2 MG: 2 LOZENGE ORAL at 20:44

## 2025-05-15 RX ADMIN — HYDROXYZINE HYDROCHLORIDE 25 MG: 25 TABLET, FILM COATED ORAL at 10:19

## 2025-05-15 ASSESSMENT — ACTIVITIES OF DAILY LIVING (ADL)
ADLS_ACUITY_SCORE: 60
HYGIENE/GROOMING: INDEPENDENT;PROMPTS
ADLS_ACUITY_SCORE: 60
DRESS: SCRUBS (BEHAVIORAL HEALTH);INDEPENDENT;PROMPTS
ADLS_ACUITY_SCORE: 60
ORAL_HYGIENE: INDEPENDENT;PROMPTS
ADLS_ACUITY_SCORE: 60
LAUNDRY: UNABLE TO COMPLETE
ADLS_ACUITY_SCORE: 60

## 2025-05-15 NOTE — PLAN OF CARE
"  Problem: Psychotic Signs/Symptoms  Goal: Improved Behavioral Control (Psychotic Signs/Symptoms)  5/14/2025 2008 by Jennyfer Ruiz RN  Outcome: Not Progressing  5/14/2025 1901 by Jennyfer Ruiz RN  Outcome: Not Progressing   Goal Outcome Evaluation:  BP (!) 163/94 (BP Location: Left arm, Patient Position: Sitting, Cuff Size: Adult Regular)   Pulse 77   Temp 98.9  F (37.2  C) (Oral)   Resp 16   Ht 1.626 m (5' 4\")   Wt 76.2 kg (168 lb)   LMP  (LMP Unknown)   SpO2 97%   BMI 28.84 kg/m        Pt was asleep until 1830, came out of their room anxious, disorganized and  agitated requesting for Ativan for anxiety . RN tried to explain to them that they don't have an order. Pt kept saying look it up not willing to listen to the explanation. RN explained to them the medication is linked to 2 other medications and not allowed to give just ativan by itself. They got agitated and started yelling, calling names got escalated. Pt said \" I tried all the medication today it is not working for me and they told me I can have it \" the pt was about to code so gave them PRN. Pt's B/P was high this AM tried to recheck it multiple times, but refused . They are focused on PRN meds and nicotine lozenge only, not willing to respond to assessment questions. They had flat blunted affect. They denied SI/SIB/AH/VH but appeared to be responding to internal stimuli. Pt thought staff and pts were talking about them. Gave schedule Haldol and Zyprexa at 2230 per Dr Gates.                       "

## 2025-05-15 NOTE — DISCHARGE INSTRUCTIONS
Behavioral Discharge Planning and Instructions    Summary: You were admitted on 5/12/2025  due to {[facility] 1:[id number]}.  You were treated by Arturo Lafleur MD and discharged on *** from [facility] [address] to {[facility] D/C Locations:[id number]}    Main Diagnosis:       Commitment:   You were dually committed to the Commissioner of Human Services on *** and you are being discharged on a Provisional Discharge Agreement which shall remain in effect for the duration of the Commitment which expires on ***. and [place]: You are also court ordered to take the medications that the doctor ordered for you.     Health Care Follow-up:   Appointment Date/Time: ***   Psychiatrist/Primary Care Giver: ***   Address: ***   Phone Number: ***    Appointment Date/Time: ***   Therapist: ***   Address: ***   Phone Number: ***    Appointment Date/Time: ***   Support Group: ***   Address: ***   Phone Number: ***    Appointment Date/Time: ***   Other: ***   Address: ***   Phone Number: ***    If no appointments scheduled, explain ***      Patient Navigation Hub:   [facility] Navigators work to be your point-of-contact for trustworthy and compassionate care from Inpatient services to [facility] Programmatic Care. We will provide resources and communication to help guide you into programmatic care. Ultimately, our goal is to be the one-stop-shop of communication, coordination, and support for your journey to programmatic care.    Phone: [phone]    Information will be faxed to your outpatient providers to ensure a healthy continuity of care for you.     Attend all scheduled appointments with your outpatient providers. Call at least 24 hours in advance if you need to reschedule an appointment to ensure continued access to your outpatient providers.     Major Treatments, Procedures and Findings:  You were provided with: medication evaluation and/or management, group therapy, individual therapy, and milieu management    Symptoms to  Report: feeling more aggressive, increased confusion, losing more sleep, mood getting worse, or thoughts of suicide    Early warning signs can include: increased depression or anxiety sleep disturbances increased thoughts or behaviors of suicide or self-harm  increased unusual thinking, such as paranoia or hearing voices    Safety and Wellness:  Take all medicines as directed.  Make no changes unless your doctor suggests them.      Follow treatment recommendations.  Refrain from alcohol and non-prescribed drugs.  If there is a concern for safety, call 911.    Resources:   Mental Health Crisis Resources  Throughout [place]: call **CRISIS (**703944)  Crisis Text Line: is available for free, 24/7 by texting [place] to 422820  Suicide Awareness Voices of Education (SAVE) (www.save.org): 712-896-WTXD (1063)  The National Suicide Prevention Lifeline is now: 988 Suicide and Crisis Lifeline. Call 988 anytime.  National [employer] on Mental Illness (www.[place].ashley.org): 965.256.8459 or 514-055-6413.  Vwnn8mvmg: text the word LIFE to 40078 for immediate support and crisis intervention  Mental Health Consumer/Survivor Network of [place] (www.mhcsn.net): 491-339-8504 or 811-978-3678  Mental Health Association of [place] (www.mentalhealth.org): 991.794.7651 or 809-946-0475  Peer Support Connection [place] Warmline (PSC) 1-581.255.6971 Available from 5pm - 9am (7 days a week/365 days a year)  [facility] 1-365.138.4206 Community Outreach for Psych Emergencies  {:PHR,SUDAVSRESOURCES}    General Medication Instructions:   See your medication sheet(s) for instructions.   Take all medicines as directed.  Make no changes unless your doctor suggests them.   Go to all your doctor visits.  Be sure to have all your required lab tests. This way, your medicines can be refilled on time.  Do not use any drugs not prescribed by your doctor.  Avoid alcohol.    Advance Directives:   Scanned document on file with [place]? No scanned doc  Is document scanned? Pt  states no documents  Honoring Choices Your Rights Handout: Informed and given  Was more information offered? Pt declined    The Treatment team has appreciated the opportunity to work with you. If you have any questions or concerns about your recent admission, you can contact the unit which can receive your call 24 hours a day, 7 days a week. They will be able to get in touch with a Provider if needed. The unit number is [phone] .

## 2025-05-15 NOTE — PLAN OF CARE
BEH IP Unit Acuity Rating Score (UARS)  Patient is given one point for every criteria they meet.    CRITERIA SCORING   On a 72 hour hold, court hold, committed, stay of commitment, or revocation. 0    Patient LOS on BEH unit exceeds 20 days. 0  LOS: 3   Patient under guardianship, 55+, otherwise medically complex, or under age 11. 0   Suicide ideation without relief of precipitating factors. 1   Current plan for suicide. 0   Current plan for homicide. 0   Imminent risk or actual attempt to seriously harm another without relief of factors precipitating the attempt. 0   Severe dysfunction in daily living (ex: complete neglect for self care, extreme disruption in vegetative function, extreme deterioration in social interactions). 1   Recent (last 7 days) or current physical aggression in the ED or on unit. 1   Restraints or seclusion episode in past 72 hours. 0   Recent (last 7 days) or current verbal aggression, agitation, yelling, etc., while in the ED or unit. 1   Active psychosis. 1   Need for constant or near constant redirection (from leaving, from others, etc).  0   Intrusive or disruptive behaviors. 0   Patient requires 3 or more hours of individualized nursing care per 8-hour shift (i.e. for ADLs, meds, therapeutic interventions). 0   TOTAL 5

## 2025-05-15 NOTE — PROGRESS NOTES
"[facility], [place]   Psychiatric Progress Note        Interim History:   The patient's care was discussed with the treatment team during the daily team meeting and/or staff's chart notes were reviewed.  Staff report patient slept for only 2.75 hours last night, but was reported to nap during the day. Remained isolative and spent most of time in their room, but was also seen outside for a short time. Cam again refused labs and often times refused vitals or needed to be convinced to allow them, despite elevated blood pressure. Very focused on benzodiazepines, stating that no medication was helpful for him and insisting that he needed B52 with Ativan, see RN's note below:    \"Goal Outcome Evaluation:  BP (!) 163/94 (BP Location: Left arm, Patient Position: Sitting, Cuff Size: Adult Regular)   Pulse 77   Temp 98.9  F (37.2  C) (Oral)   Resp 16   Ht 1.626 m (5' 4\")   Wt 76.2 kg (168 lb)   LMP  (LMP Unknown)   SpO2 97%   BMI 28.84 kg/m        Pt was asleep until 1830, came out of their room anxious, disorganized and  agitated requesting for Ativan for anxiety . RN tried to explain to them that they don't have an order. Pt kept saying look it up not willing to listen to the explanation. RN explained to them the medication is linked to 2 other medications and not allowed to give just ativan by itself. They got agitated and started yelling, calling names got escalated. Pt said \" I tried all the medication today it is not working for me and they told me I can have it \" the pt was about to code so gave them PRN. Pt's B/P was high this AM tried to recheck it multiple times, but refused . They are focused on PRN meds and nicotine lozenge only, not willing to respond to assessment questions. They had flat blunted affect. They denied SI/SIB/AH/VH but appeared to be responding to internal stimuli. Pt thought staff and pts were talking about them. Gave schedule Haldol and Zyprexa at 2230 per  " "Yajaira.\"    Met with patient: patient was seen in conference room in presence of PA student. Patient walked in willingly and talked to us. Said that his mood was somewhat better and, during our meeting, was somewhat more cooperative and, definitely, more polite. Denied presence of Suicidal ideation, Homicidal thoughts, stated that Auditory hallucinations were better and less frequent. Indicated that he would like to go to [facility], mentioned couple of places as his preference, one of them in [place]. We discussed with Cam his requests for Ativan, advised him that it would not be prescribed and that in the future he should use non controlled substance for antianxiety prns and that we would discontinue Ativan from B52. Cam took it calmly, didn't escalate. We encouraged him to spend more time outside and start going to groups, he promised to do that and had no more questions or concerns.          Medications:     Current Facility-Administered Medications   Medication Dose Route Frequency Provider Last Rate Last Admin    haloperidol (HALDOL) tablet 5 mg  5 mg Oral BID Arturo Lafleur MD   5 mg at 05/15/25 1019    losartan (COZAAR) tablet 50 mg  50 mg Oral Daily RommelJayant mccullough MD   50 mg at 05/15/25 1019    OLANZapine (zyPREXA) tablet 10 mg  10 mg Oral At Bedtime Miki Olguin MD   10 mg at 05/14/25 2230    venlafaxine (EFFEXOR XR) 24 hr capsule 75 mg  75 mg Oral Daily with breakfast Arturo Lafleur MD   75 mg at 05/15/25 1019          Allergies:     Allergies   Allergen Reactions    Bee Venom Anaphylaxis     14 year's ago anaphylaxis went to hospital,  Did have positive venom skin testing    Wasp Venom Protein Anaphylaxis    Sulfa Antibiotics Rash          Labs:     No results found for this or any previous visit (from the past 24 hours).         Psychiatric Examination:     BP (!) 148/95 (BP Location: Right arm, Patient Position: Supine)   Pulse 83   Temp 97.9  F (36.6  C) (Oral)   Resp " "17   Ht 1.626 m (5' 4\")   Wt 76.2 kg (168 lb)   LMP  (LMP Unknown)   SpO2 96%   BMI 28.84 kg/m    Weight is 168 lbs 0 oz  Body mass index is 28.84 kg/m .    Orthostatic Vitals         Most Recent      Lying Orthostatic /95 05/15 1300    Lying Orthostatic Pulse (bpm) 83 05/15 1300    Sitting Orthostatic /106 05/15 1023    Sitting Orthostatic Pulse (bpm) 106 05/15 1023    Standing Orthostatic /89 05/15 1023    Standing Orthostatic Pulse (bpm) 132 05/15 1023         Orthostatic tachycardia noted, patient is asymptomatic.    Appearance: dressed in hospital scrubs and appeared as age stated  Attitude: more cooperative  Eye Contact:  fair  Mood:  lessirritable  Affect:  mood congruent  Speech:  clear, coherent  Psychomotor Behavior:  no evidence of tardive dyskinesia, dystonia, or tics  Throught Process:  linear  Associations:  no loose associations  Thought Content:  auditory hallucinations present, less frequent and less intense, denies Suicidal ideation, Homicidal thoughts   Insight:  limited, but improving?  Judgement:  limited  Oriented to:  time, person, and place  Attention Span and Concentration:  fair, didn't ask to repeat  Recent and Remote Memory:  fair    Clinical Global Impressions  First: 7/4 5/14/2025      Most recent:            Precautions:     Behavioral Orders   Procedures    Assault precautions    Code 1 - Restrict to Unit    Routine Programming     As clinically indicated    Status 15     Every 15 minutes.    Suicide precautions: Suicide Risk: HIGH     Patients on Suicide Precautions should have a Combination Diet ordered that includes a Diet selection(s) AND a Behavioral Tray selection for Safe Tray - with utensils, or Safe Tray - NO utensils       Suicide Risk:   HIGH          DIagnoses:     Schizoaffective disorder, bipolar; rule chronic paranoid schizophrenia, acute exacerbation; polysubstance abuse; ADHD; JOHANNA; Borderline personality disorder and antisocial personality " disorder.          Plan:     Will continue on current dose of Haldol. Will discontinue Ativan as a part of B52 and discontinue labs as patient refuses them as of 5/15/2025. Will continue on No roommate order. Plan to send to I[facility]S when more stable.    Total time spent was 37 minutes. Over 50% of times was spent counseling and coordination of care regarding coping skills, medication and discharge planning.

## 2025-05-15 NOTE — PLAN OF CARE
"Team Note Due:  Wednesday     Assessment/Intervention/Current Symtoms and Care Coordination:  Chart review and met with team, discussed pt progress, symptomology, and response to treatment.  Discussed the discharge plan and any potential impediments to discharge.    In team it was discussed that Cam uses \"they/them\" pronouns.  RN reported Cam was demanding Ativan and became irritable.  B52 was given and they said this helped and wanted it in the future.    Mom called saying they are enrolled in the [facility] with [facility] which is case management.  Tried to meet with Cam to sign DEEDEE but they did not wake up from their sleep.  Will attempt to sign DEEDEE again tomorrow.       Discharge Plan or Goal:  IRTS     Barriers to Discharge:  Severity of symptoms     Referral Status:  None      Legal Status:  Voluntary      Contacts (include DEEDEE status):  [facility]  Mary Castro (Mom): [phone] DEEDEE  [facility]:   Caity Lau   Phone Number: [phone]  Email: [email or web address]     Upcoming Meetings and Dates/Important Information and next steps:  None     "

## 2025-05-15 NOTE — PLAN OF CARE
Goal Outcome Evaluation:       Patient has been sleeping since the start of the shift, breathing quietly and unlabored.  They were reminded to come out for meals at dinner time, they requested their tray to be saved. Patient came out later for snack and was able to eat their dinner. They watched TV and socialized with a select peer for a few minutes and went back to their room. They refused check of VS this shift. Patient came out to the nurses station and requested for their night time medication. Writer was able to check in with the patient at that time. They deny SI/SIB/HI and AVH at this time. They continue to complain of generalized pain. Patient received Tylenol with their bedtime med's and returned back to their room, to continue monitoring.

## 2025-05-15 NOTE — PROGRESS NOTES
"Brief Medicine Follow-Up Note:    BP (!) 148/95 (BP Location: Right arm, Patient Position: Supine)   Pulse 83   Temp 97.9  F (36.6  C) (Oral)   Resp 17   Ht 1.626 m (5' 4\")   Wt 76.2 kg (168 lb)   LMP  (LMP Unknown)   SpO2 96%   BMI 28.84 kg/m        BP trend slowly improving, added BMP for 1 week after re-initiation of ARB to monitor Scr. Will continue to peripherally monitor BP trend and make adjustments as needed. Please reach out to medicine with concerns.       Renetta Camarena PA-C      "

## 2025-05-15 NOTE — PLAN OF CARE
"Problem: Sleep Disturbance  Goal: Adequate Sleep/Rest  Outcome: Progressing  Intervention: Promote Sleep/Rest  Flowsheets (Taken 5/15/2025 0120)  Sleep/Rest Enhancement:   awakenings minimized   noise level reduced   room darkened     Goal Outcome Evaluation:    Patient in bed at the start of the shift and  appeared to be comfortably asleep and breathing with ease. Multiple cups, open milk cartons, juices, and snacks removed from the patient's room as they are NPO for fasting labs. Patient was educated on this on evening before they went to bed. Patient woke up while this writer was in the process of removing the items and was reminded of their NPO status and reason. Patient expressed understanding and acceptance. At 0030 patient presented to the desk requesting a Nicotine lozenge and water. Patient informed that stimulants such as nicotine are not administered during the night. Patient made a face of disbelief and they requested a cup for water. Patient reminded they are NPO. Patient stated, \"I don't want to get mad. I need a glass.\" Patient responded, \"yes\" when this writer asked if they were intending on refusing their morning lab draw. Patient presented to the desk requesting milk stating, \"it woke me up.\" Patient did not clarify what \"it\" was that woke them up.     Patient slept for 2.75 hours of the over night shift with no s/s of acute distress. Patient experienced no safety events or escalations during the shift, no concerns reported or observed. Safety checks completed at least every 15 minutes. Patient required no PRN medications, see MAR. Patient has a no roommate order because they remain threatening to peers, staff, and paranoid. Patient continues on suicide and assault precautions. Patient refused to remain NPO for fasting labs. Nursing will continue to monitor.        "

## 2025-05-15 NOTE — PLAN OF CARE
"  Problem: Psychotic Signs/Symptoms  Goal: Improved Behavioral Control (Psychotic Signs/Symptoms)  Outcome: Not Progressing   Goal Outcome Evaluation:  Combination PRN Medication Administration    Medications Administered: Benadryl+Haldol+Ativan combination    What patient symptom(s) led to the administration of these medications?    Pt got PRN Zyprexa around 1400 and they were sleeping until 1845. Came out of their room agitated looking for the RN kept saying I need ativan. RN tried to explain to pt they don't have ativan order. Pt escalated and started yelling and calling names \" give me my medication. Pt wasn't willing to try PRN Zyprexa. Pt said \" I tried all PRN today but it is not helping me. They told me I can have it.\" They were about to code.     What interventions were attempted to avoid use of these medications (including other PRN medications)?     Offered them different PRN medication and tried to explain to them the process of giving this PRN and the steps we have to take and another staff tried to redirect them but they were not cooperative.    What were the patient's response(s) to the interventions?   They were calmer and appropriate and able to have conversation without yelling and screaming.          Provider notified: Dr Gates        Date: 05/14/25        Time: 1845    Jennyfer Ruiz RN                                 "

## 2025-05-15 NOTE — PLAN OF CARE
"  Problem: Adult Behavioral Health Plan of Care  Goal: Adheres to Safety Considerations for Self and Others  Outcome: Progressing  Flowsheets (Taken 5/15/2025 1234)  Adheres to Safety Considerations for Self and Others: making progress toward outcome     Problem: Suicide Risk  Goal: Absence of Self-Harm  Outcome: Progressing     Brief shift overview: This morning, pt approached writer to inform writer that last night the \"benadryl, ativan, zyprexa worked really well and helped and helped the voices\" and asked if they could have it this morning. Writer informed pt that it was benadryl, ativan, and haldol that they had together and the zyprexa they received at bedtime. Writer also informed pt that they cannot have the ativan and that they can discuss further with provider. Writer then offered scheduled medication and informed pt of other PRN options. Pt declined at the time and went to their room. Pt later did accept scheduled medication and a different PRN. Per provider, pt informed that the ativan was getting discontinued from the benadryl, haldol, ativan order. Per provider, pt handled this news calmly and did not escalate. Pt did not mention ativan again to writer after learning this information. Pt visible on unit attending meals and occasionally visible in dining area to refill cup of water or near the nurse's station to request PRN nicotine lozenges. Pt otherwise spends majority of their day resting in their room. Pt keeps to themselves and does not engage with staff or peers. Pt did not attend group this shift.     Medications: Pt did not want to do their VS or take their scheduled medications this morning; however, a little after 1000, pt approached writer and asked for PRN nicotine lozenge. Writer asked pt if they would allow VS and take their scheduled morning medications as well - pt agreed. Pt received scheduled medications @ 1019. Pt received the following PRN(s) this shift: tylenol, atarax, nicotine " "lozenges    Medical Issues: Pt refused scheduled labs again this shift - provider discontinued labs. Pt informed of this. Pt c/o 5/10 pain \"everywhere\" - PRN tylenol given per pt request. Pt reports pain remains at 5/10; however, declines further pharmacological or nonpharmacological interventions for pain at this time. Pt also hypertensive and tachycardic - Orthostatic /106 sitting and 127/89 standing. Pulse 106 sitting and 132 standing. Pt asymptomatic. Provider and IM notified. Pt eventually allowed recheck this afternoon; however, declined to do orthostatic. Pt's BP recheck 148/95 lying, pulse 83 lying. Pt does have PRN hydralazine if they meet criteria. Per IM, nursing to notify IM if SBP >160, DBP >100 or if pt develops any symptoms with elevated BP (dizziness, HA, vision changes, etc.). No other medical issues observed or reported this shift.     Mental Health: Pt endorses anxiety and depression; however is unable to rate at this time. Pt reports that their anxiety and depression are better compared to previous day - scheduled medication given, along with PRN atarax per pt request. Apart from asking for ativan, pt declined further intervention for anxiety at this time. Pt endorses SI/SIB thoughts while in the hospital; however, denies any plan/method/intent in the hospital. Pt contracts for safety within the hospital. Pt reports they are unsure if they would be safe if they discharged. Pt denies HI and VH. Pt continues to endorse AH; however, they report the voices are \"getting better.\" Pt appears unkempt; however, they decline to shower this shift. Eye contact observed to be intermittent. Pt appears hesitant at times to make eye contact. Pt observed to be guarded, irrational, and uncooperative at times; however, less so compared to previous day. Pt observed to be intermittently tense, irritable, and restless; however, less so compared to previous day. Pt presents with a flat, blunted affect. Pt " "intermittently observed to be calm and cooperative, which is an improvement compared to previous day writer has cared for pt. No episodes of yelling noted this shift. Pt's speech observed to be pressured at times but is clear and coherent. Pt's concentration observed to be impaired and pt observed to be forgetful at times. Pt observed to be isolative and withdrawn. Pt remains on suicide and assault precautions. Pt continues to have a no roommate order. They deny any additional questions or concerns at this time. Will continue to monitor and assist as needed.     Recommendations to oncoming staff:  Pt prefers to go by \"Cam.\" They/them pronouns.  Pt prefers male staff when/if possible.  Continue to monitor VS and notify IM if pt's SBP >160 or DBP >100 or if pt's BP is elevated and pt is symptomatic (see IM note).   Pt has PRN zyprexa and scheduled zyprexa. Please be mindful of this to ensure pt does not exceed 30 mg in 24 hours. Per provider, please alternate PRN zyprexa with PRN benadryl and haldol order. Pt's PRN zyprexa is first line for agitation.   Continue to support pt's plan of care.     BP (!) 148/95 (BP Location: Right arm, Patient Position: Supine)   Pulse 83   Temp 97.9  F (36.6  C) (Oral)   Resp 17   Ht 1.626 m (5' 4\")   Wt 76.2 kg (168 lb)   LMP  (LMP Unknown)   SpO2 96%   BMI 28.84 kg/m      "

## 2025-05-16 PROCEDURE — 99232 SBSQ HOSP IP/OBS MODERATE 35: CPT | Performed by: PSYCHIATRY & NEUROLOGY

## 2025-05-16 PROCEDURE — 250N000013 HC RX MED GY IP 250 OP 250 PS 637: Performed by: PSYCHIATRY & NEUROLOGY

## 2025-05-16 PROCEDURE — 250N000013 HC RX MED GY IP 250 OP 250 PS 637: Performed by: EMERGENCY MEDICINE

## 2025-05-16 PROCEDURE — 124N000002 HC R&B MH UMMC

## 2025-05-16 RX ORDER — NICOTINE 21 MG/24HR
1 PATCH, TRANSDERMAL 24 HOURS TRANSDERMAL DAILY
Status: DISCONTINUED | OUTPATIENT
Start: 2025-05-17 | End: 2025-05-21 | Stop reason: HOSPADM

## 2025-05-16 RX ADMIN — OLANZAPINE 10 MG: 10 TABLET, FILM COATED ORAL at 21:14

## 2025-05-16 RX ADMIN — NICOTINE POLACRILEX 2 MG: 2 LOZENGE ORAL at 13:04

## 2025-05-16 RX ADMIN — VENLAFAXINE HYDROCHLORIDE 75 MG: 75 CAPSULE, EXTENDED RELEASE ORAL at 07:53

## 2025-05-16 RX ADMIN — HALOPERIDOL 5 MG: 5 TABLET ORAL at 07:53

## 2025-05-16 RX ADMIN — HALOPERIDOL 5 MG: 5 TABLET ORAL at 21:14

## 2025-05-16 RX ADMIN — LOSARTAN POTASSIUM 50 MG: 25 TABLET, FILM COATED ORAL at 07:53

## 2025-05-16 RX ADMIN — HYDROXYZINE HYDROCHLORIDE 25 MG: 25 TABLET, FILM COATED ORAL at 13:03

## 2025-05-16 RX ADMIN — NICOTINE POLACRILEX 2 MG: 2 LOZENGE ORAL at 20:11

## 2025-05-16 RX ADMIN — NICOTINE POLACRILEX 2 MG: 2 LOZENGE ORAL at 18:49

## 2025-05-16 RX ADMIN — NICOTINE POLACRILEX 2 MG: 2 LOZENGE ORAL at 21:14

## 2025-05-16 ASSESSMENT — ACTIVITIES OF DAILY LIVING (ADL)
ADLS_ACUITY_SCORE: 60
HYGIENE/GROOMING: INDEPENDENT;PROMPTS
ADLS_ACUITY_SCORE: 60

## 2025-05-16 NOTE — PLAN OF CARE
"  Problem: Psychotic Signs/Symptoms  Goal: Improved Mood Symptoms (Psychotic Signs/Symptoms)  Outcome: Progressing   Goal Outcome Evaluation:             Earlier in the shift Pt presents with an irritable and tense affect, spent half the shift resting in bed sleeping. Pt declined breakfast reporting not being hungry, eat 100% of lunch. Pt requested soda pop from her locker became annoyed when her request was denied, agreed to a [place] drink. Visible in the lounge isolative and withdrawn, reporting increased anxiety and depression PRN Hydroxyzine effective, spent the afternoon resting in bed.          Speech is blunted, maintains eye contact, thought process is organized when answering questions, hygiene is unkempt.    Pt. Endorses anxiety 6/10, depression 8/10, denies SI/HI/SIB and hallucinations. Medication compliance PRN Nicotine [place] and contracted for safety.     Blood pressure (!) 148/95, pulse 83, temperature 97.9  F (36.6  C), temperature source Oral, resp. rate 17, height 1.626 m (5' 4\"), weight 76.2 kg (168 lb), SpO2 96%.             "

## 2025-05-16 NOTE — PLAN OF CARE
Problem: Sleep Disturbance  Goal: Adequate Sleep/Rest  Outcome: Progressing  Intervention: Promote Sleep/Rest  Sleep/Rest Enhancement:   awakenings minimized   noise level reduced   regular sleep/rest pattern promoted   Goal Outcome Evaluation: Ongoing    Patient appeared to be asleep at the start of shift. No s/s of pain nor discomfort noted, intermittent body movements and even unlabored respirations were observed during the safety checks through the night. Patient slept for 7 hrs at night. No safety concerns noted.

## 2025-05-16 NOTE — PLAN OF CARE
BEH IP Unit Acuity Rating Score (UARS)  Patient is given one point for every criteria they meet.    CRITERIA SCORING   On a 72 hour hold, court hold, committed, stay of commitment, or revocation. 0    Patient LOS on BEH unit exceeds 20 days. 0  LOS: 4   Patient under guardianship, 55+, otherwise medically complex, or under age 11. 0   Suicide ideation without relief of precipitating factors. 1   Current plan for suicide. 0   Current plan for homicide. 0   Imminent risk or actual attempt to seriously harm another without relief of factors precipitating the attempt. 0   Severe dysfunction in daily living (ex: complete neglect for self care, extreme disruption in vegetative function, extreme deterioration in social interactions). 1   Recent (last 7 days) or current physical aggression in the ED or on unit. 0   Restraints or seclusion episode in past 72 hours. 0   Recent (last 7 days) or current verbal aggression, agitation, yelling, etc., while in the ED or unit. 1   Active psychosis. 1   Need for constant or near constant redirection (from leaving, from others, etc).  0   Intrusive or disruptive behaviors. 0   Patient requires 3 or more hours of individualized nursing care per 8-hour shift (i.e. for ADLs, meds, therapeutic interventions). 0   TOTAL 4

## 2025-05-16 NOTE — PLAN OF CARE
Team Note Due:  Wednesday     Assessment/Intervention/Current Symtoms and Care Coordination:  Chart review and met with team, discussed pt progress, symptomology, and response to treatment.  Discussed the discharge plan and any potential impediments to discharge.    In team it was discussed that Cam was in bed all shift.  They refused breakfast.  They came out of their room late during the evening last night.  Discussion of referring Cam to [facility].      Referred Cam to [facility], [facility] and [facility].       Discharge Plan or Goal:  IRTS     Barriers to Discharge:  Severity of symptoms  Housing security  Limited social support     Referral Status:  [employer] [facility] 5/16/2025  [facility] 5/16/2025  [facility] 5/16/2025     Legal Status:  Voluntary      Contacts (include DEEDEE status):  [facility]  Mary Castro ([facility] [place] [phone] DEEDEE  [facility]:   Caity Lau   Phone Number: [phone]  Email: [email or web address]     Upcoming Meetings and Dates/Important Information and next steps:  None

## 2025-05-16 NOTE — PROGRESS NOTES
"[facility], [place]   Psychiatric Progress Note        Interim History:   The patient's care was discussed with the treatment team during the daily team meeting and/or staff's chart notes were reviewed.  Staff report patient slept for 7 hours last night. They spent more time outside and was seen watching TV and socializing with select peer. Overall, more appropriate, less confrontational, see RN's note below:    \"Patient has been sleeping since the start of the shift, breathing quietly and unlabored.  They were reminded to come out for meals at dinner time, they requested their tray to be saved. Patient came out later for snack and was able to eat their dinner. They watched TV and socialized with a select peer for a few minutes and went back to their room. They refused check of VS this shift. Patient came out to the nurses station and requested for their night time medication. Writer was able to check in with the patient at that time. They deny SI/SIB/HI and AVH at this time. They continue to complain of generalized pain. Patient received Tylenol with their bedtime med's and returned back to their room, to continue monitoring.\"    Met with patient: patient was seen in their room in presence of PA student. Patient was laying in her bed throughout our whole visit. Said that things were OK and they had no major problems or concerns. Asked about status of referral to IRTS, was aware of being enrolled into [facility] program. We suggested to talk about IRTS with CTC. Said that voices were still present off and on, but less frequent and less intense compared with prior to admission. Cam in the end of our conversation again approached issue of getting Ativan but not as forcefully as before and accepted when we again said that we would not prescribe it because of confirmed CD history. Patient was made aware of seeing a new provider starting next week and said that they had no other questions or " "concerns.         Medications:     Current Facility-Administered Medications   Medication Dose Route Frequency Provider Last Rate Last Admin    haloperidol (HALDOL) tablet 5 mg  5 mg Oral BID Arturo Lafleur MD   5 mg at 05/16/25 0753    losartan (COZAAR) tablet 50 mg  50 mg Oral Daily Jayant Carney MD   50 mg at 05/16/25 0753    OLANZapine (zyPREXA) tablet 10 mg  10 mg Oral At Bedtime Miki Olguin MD   10 mg at 05/15/25 2116    venlafaxine (EFFEXOR XR) 24 hr capsule 75 mg  75 mg Oral Daily with breakfast Arturo Lafleur MD   75 mg at 05/16/25 0753          Allergies:     Allergies   Allergen Reactions    Bee Venom Anaphylaxis     14 year's ago anaphylaxis went to hospital,  Did have positive venom skin testing    Wasp Venom Protein Anaphylaxis    Sulfa Antibiotics Rash          Labs:     No results found for this or any previous visit (from the past 24 hours).         Psychiatric Examination:     BP (!) 148/95 (BP Location: Right arm, Patient Position: Supine)   Pulse 83   Temp 97.9  F (36.6  C) (Oral)   Resp 17   Ht 1.626 m (5' 4\")   Wt 76.2 kg (168 lb)   LMP  (LMP Unknown)   SpO2 96%   BMI 28.84 kg/m    Weight is 168 lbs 0 oz  Body mass index is 28.84 kg/m .    Orthostatic Vitals         Most Recent      Lying Orthostatic /95 05/15 1300    Lying Orthostatic Pulse (bpm) 83 05/15 1300    Sitting Orthostatic /106 05/15 1023    Sitting Orthostatic Pulse (bpm) 106 05/15 1023    Standing Orthostatic /89 05/15 1023    Standing Orthostatic Pulse (bpm) 132 05/15 1023        Standing tachycardia - asymptomatic    Appearance: dressed in hospital scrubs and appeared as age stated  Attitude: more cooperative  Eye Contact:  fair  Mood:  lessirritable, less dysphoric  Affect:  mood congruent  Speech:  clear, coherent  Psychomotor Behavior:  no evidence of tardive dyskinesia, dystonia, or tics  Throught Process:  linear  Associations:  no loose associations  Thought Content: "  auditory hallucinations present, less frequent and less intense, denies Suicidal ideation, Homicidal thoughts   Insight:  limited, but improving?  Judgement:  limited, improving  Oriented to:  time, person, and place  Attention Span and Concentration:  fair, didn't ask to repeat  Recent and Remote Memory:  fair    Clinical Global Impressions  First: 7/4 5/14/2025      Most recent:            Precautions:     Behavioral Orders   Procedures    Assault precautions    Code 1 - Restrict to Unit    Routine Programming     As clinically indicated    Status 15     Every 15 minutes.    Suicide precautions: Suicide Risk: HIGH     Patients on Suicide Precautions should have a Combination Diet ordered that includes a Diet selection(s) AND a Behavioral Tray selection for Safe Tray - with utensils, or Safe Tray - NO utensils       Suicide Risk:   HIGH          DIagnoses:     Schizoaffective disorder, bipolar; rule chronic paranoid schizophrenia, acute exacerbation; polysubstance abuse; ADHD; JOHANNA; Borderline personality disorder and antisocial personality disorder.          Plan:     Will continue on current dose of Haldol. We discontinued Ativan as a part of B52 - No medication changes today 5/16/2025. Patient is improving. Will continue on No roommate order. Plan to send to IRTS when more stable.    Total time spent was 35 minutes. Over 50% of times was spent counseling and coordination of care regarding coping skills, medication and discharge planning.

## 2025-05-17 PROCEDURE — 250N000013 HC RX MED GY IP 250 OP 250 PS 637: Performed by: PSYCHIATRY & NEUROLOGY

## 2025-05-17 PROCEDURE — 97150 GROUP THERAPEUTIC PROCEDURES: CPT | Mod: GO

## 2025-05-17 PROCEDURE — 99207 PR NO CHARGE LOS: CPT

## 2025-05-17 PROCEDURE — 250N000013 HC RX MED GY IP 250 OP 250 PS 637

## 2025-05-17 PROCEDURE — 124N000002 HC R&B MH UMMC

## 2025-05-17 PROCEDURE — 250N000013 HC RX MED GY IP 250 OP 250 PS 637: Performed by: EMERGENCY MEDICINE

## 2025-05-17 RX ADMIN — NICOTINE POLACRILEX 2 MG: 2 LOZENGE ORAL at 15:09

## 2025-05-17 RX ADMIN — NICOTINE POLACRILEX 2 MG: 2 LOZENGE ORAL at 12:43

## 2025-05-17 RX ADMIN — HALOPERIDOL 5 MG: 5 TABLET ORAL at 08:05

## 2025-05-17 RX ADMIN — LOSARTAN POTASSIUM 50 MG: 25 TABLET, FILM COATED ORAL at 08:05

## 2025-05-17 RX ADMIN — NICOTINE POLACRILEX 2 MG: 2 LOZENGE ORAL at 18:26

## 2025-05-17 RX ADMIN — Medication 12.5 MG: at 12:21

## 2025-05-17 RX ADMIN — NICOTINE 1 PATCH: 14 PATCH, EXTENDED RELEASE TRANSDERMAL at 08:06

## 2025-05-17 RX ADMIN — NICOTINE POLACRILEX 2 MG: 2 LOZENGE ORAL at 14:11

## 2025-05-17 RX ADMIN — NICOTINE POLACRILEX 2 MG: 2 LOZENGE ORAL at 10:12

## 2025-05-17 RX ADMIN — OLANZAPINE 10 MG: 10 TABLET, FILM COATED ORAL at 19:56

## 2025-05-17 RX ADMIN — HALOPERIDOL 5 MG: 5 TABLET ORAL at 19:56

## 2025-05-17 RX ADMIN — NICOTINE POLACRILEX 2 MG: 2 LOZENGE ORAL at 19:56

## 2025-05-17 RX ADMIN — VENLAFAXINE HYDROCHLORIDE 75 MG: 75 CAPSULE, EXTENDED RELEASE ORAL at 08:06

## 2025-05-17 RX ADMIN — HYDROXYZINE HYDROCHLORIDE 25 MG: 25 TABLET, FILM COATED ORAL at 16:19

## 2025-05-17 ASSESSMENT — ACTIVITIES OF DAILY LIVING (ADL)
LAUNDRY: WITH SUPERVISION
ADLS_ACUITY_SCORE: 60
ORAL_HYGIENE: INDEPENDENT
ADLS_ACUITY_SCORE: 60
ADLS_ACUITY_SCORE: 50
DRESS: INDEPENDENT
ADLS_ACUITY_SCORE: 60
ADLS_ACUITY_SCORE: 40
HYGIENE/GROOMING: INDEPENDENT
ADLS_ACUITY_SCORE: 50
ADLS_ACUITY_SCORE: 60
DRESS: SCRUBS (BEHAVIORAL HEALTH)
ADLS_ACUITY_SCORE: 60
ADLS_ACUITY_SCORE: 40
HYGIENE/GROOMING: HANDWASHING;PROMPTS
ADLS_ACUITY_SCORE: 40
ADLS_ACUITY_SCORE: 60
ADLS_ACUITY_SCORE: 50
ADLS_ACUITY_SCORE: 50
ADLS_ACUITY_SCORE: 60
ADLS_ACUITY_SCORE: 50
ADLS_ACUITY_SCORE: 40
ADLS_ACUITY_SCORE: 40
ADLS_ACUITY_SCORE: 50
ADLS_ACUITY_SCORE: 60
ADLS_ACUITY_SCORE: 50
ADLS_ACUITY_SCORE: 60
ADLS_ACUITY_SCORE: 50
ORAL_HYGIENE: INDEPENDENT
ADLS_ACUITY_SCORE: 40

## 2025-05-17 NOTE — PLAN OF CARE
"  Problem: Psychotic Signs/Symptoms  Goal: Optimal Cognitive Function (Psychotic Signs/Symptoms)  Intervention: Support and Promote Cognitive Ability  Recent Flowsheet Documentation  Taken 5/17/2025 0930 by Eunice Sen RN  Communication Support Strategies: active listening utilized   Goal Outcome Evaluation:         Pt spent the first half of the shift isolative and withdrawn to her room, declined breakfast, eat 100% of lunch. Visible in the lounge most of the afternoon watching TV.  Pt. Presents with a tense and guarded affect, brightens on approach smiling. Calm and cooperative most of the shift. Speech is clear and coherent, maintains eye contact, thought process appears linear and organized.      Morning BP - 179/95 pt. Denies Symptoms.  Internal medicine aware, New BP parameters for Hydralazine, medication administered Rechecked BP - 132/92.        Pt endorses anxiety and depression 6/10 denies SI/HI/SIB and hallucinations. Medication compliance and contracted for safety.     Blood pressure (!) 132/92, pulse 83, temperature 97.9  F (36.6  C), temperature source Oral, resp. rate 17, height 1.626 m (5' 4\"), weight 76.2 kg (168 lb), SpO2 97%.                "

## 2025-05-17 NOTE — PLAN OF CARE
"Problem: Suicide Risk  Goal: Absence of Self-Harm  Outcome: Progressing     Problem: Adult Behavioral Health Plan of Care  Goal: Optimized Coping Skills in Response to Life Stressors  Outcome: Progressing    Patient has been observed [facility] most of the shift, watching TV and socializing with peers. They attended group and ate dinner. Patient is pleasant yet somewhat guarded on approach. Reported anxiety 7/10 and requested PRN hydroxyzine, which was somewhat helpful. Patient denied SI/SIB/HI or hallucinations. No reports of pain or discomfort. Patient took all medications at bedtime. Occasional requests for nicotine lozenges, otherwise no additional PRN requests.     /83   Pulse 107   Temp 97.3  F (36.3  C) (Oral)   Resp 17   Ht 1.626 m (5' 4\")   Wt 76.2 kg (168 lb)   LMP  (LMP Unknown)   SpO2 99%   BMI 28.84 kg/m         "

## 2025-05-17 NOTE — PROGRESS NOTES
"Medicine Follow-Up Note    BP (!) 165/117 (BP Location: Left arm, Patient Position: Semi-Zamora's, Cuff Size: Adult Regular)   Pulse 83   Temp 97.9  F (36.6  C) (Oral)   Resp 17   Ht 1.626 m (5' 4\")   Wt 76.2 kg (168 lb)   LMP  (LMP Unknown)   SpO2 97%   BMI 28.84 kg/m        Pt refused BP yesterday per RN, was able to obtain reading this AM and was elevated. Patient was asymptomatic, not complaining of pain or anxiety. Has prn hydralazine ordered nurse is going to give. Pending BP trend over next few days may increase losartan, medicine will continue to follow peripherally. Please reach out if BP remains elevated or patient becomes symptomatic.       Renetta Camarena PA-C    "

## 2025-05-17 NOTE — PLAN OF CARE
Problem: Suicide Risk  Goal: Absence of Self-Harm  Outcome: Progressing   Goal Outcome Evaluation:    Plan of Care Reviewed With: patient      Pt presents as calm. Pt was pleasant and polite. They were observed resting in their room early in the shift then out in the lounge for dinner and watching tv the rest of the evening. Pt reports feeling good. They deny mental health symptoms this evening. No SI/SIB/HI. Pt observed mumbling to themselves at times. Pt took all scheduled evening medications with no issues. Pt given prn nicotine lozenge x3 this shift. Pt reports they smoke 1 pack of cigarettes per day and lozenge is not helpful enough. Pt requested nicotine patch; on call provider paged for order starting tomorrow morning. No other safety concerns.

## 2025-05-17 NOTE — PLAN OF CARE
Problem: Sleep Disturbance  Goal: Adequate Sleep/Rest  Outcome: Progressing   Goal Outcome Evaluation:    Patient slept 7 Hours during the night. Safety checked was done every 15 minutes. No PRN was given. No concerned noted.

## 2025-05-18 PROCEDURE — 250N000013 HC RX MED GY IP 250 OP 250 PS 637: Performed by: EMERGENCY MEDICINE

## 2025-05-18 PROCEDURE — 250N000013 HC RX MED GY IP 250 OP 250 PS 637: Performed by: PSYCHIATRY & NEUROLOGY

## 2025-05-18 PROCEDURE — 124N000002 HC R&B MH UMMC

## 2025-05-18 RX ADMIN — HALOPERIDOL 5 MG: 5 TABLET ORAL at 20:20

## 2025-05-18 RX ADMIN — NICOTINE POLACRILEX 2 MG: 2 LOZENGE ORAL at 11:32

## 2025-05-18 RX ADMIN — NICOTINE POLACRILEX 2 MG: 2 LOZENGE ORAL at 20:21

## 2025-05-18 RX ADMIN — NICOTINE POLACRILEX 2 MG: 2 LOZENGE ORAL at 15:59

## 2025-05-18 RX ADMIN — OLANZAPINE 10 MG: 10 TABLET, FILM COATED ORAL at 20:20

## 2025-05-18 RX ADMIN — NICOTINE POLACRILEX 2 MG: 2 LOZENGE ORAL at 18:45

## 2025-05-18 RX ADMIN — NICOTINE POLACRILEX 2 MG: 2 LOZENGE ORAL at 21:27

## 2025-05-18 RX ADMIN — NICOTINE POLACRILEX 2 MG: 2 LOZENGE ORAL at 17:46

## 2025-05-18 RX ADMIN — NICOTINE 1 PATCH: 14 PATCH, EXTENDED RELEASE TRANSDERMAL at 08:07

## 2025-05-18 RX ADMIN — HYDROXYZINE HYDROCHLORIDE 25 MG: 25 TABLET, FILM COATED ORAL at 16:41

## 2025-05-18 RX ADMIN — NICOTINE POLACRILEX 2 MG: 2 LOZENGE ORAL at 13:55

## 2025-05-18 RX ADMIN — HALOPERIDOL 5 MG: 5 TABLET ORAL at 08:06

## 2025-05-18 RX ADMIN — LOSARTAN POTASSIUM 50 MG: 25 TABLET, FILM COATED ORAL at 08:06

## 2025-05-18 RX ADMIN — NICOTINE POLACRILEX 2 MG: 2 LOZENGE ORAL at 12:27

## 2025-05-18 RX ADMIN — VENLAFAXINE HYDROCHLORIDE 75 MG: 75 CAPSULE, EXTENDED RELEASE ORAL at 08:06

## 2025-05-18 ASSESSMENT — ACTIVITIES OF DAILY LIVING (ADL)
ADLS_ACUITY_SCORE: 40
ADLS_ACUITY_SCORE: 40
ORAL_HYGIENE: INDEPENDENT
ADLS_ACUITY_SCORE: 40
LAUNDRY: WITH SUPERVISION
ADLS_ACUITY_SCORE: 40
ADLS_ACUITY_SCORE: 40
HYGIENE/GROOMING: INDEPENDENT
ADLS_ACUITY_SCORE: 40
ADLS_ACUITY_SCORE: 40
DRESS: INDEPENDENT
ADLS_ACUITY_SCORE: 40
ADLS_ACUITY_SCORE: 40
DRESS: INDEPENDENT
ADLS_ACUITY_SCORE: 40
ADLS_ACUITY_SCORE: 40
HYGIENE/GROOMING: INDEPENDENT
ADLS_ACUITY_SCORE: 40
ORAL_HYGIENE: INDEPENDENT
ADLS_ACUITY_SCORE: 40

## 2025-05-18 NOTE — PLAN OF CARE
"  Problem: Psychotic Signs/Symptoms  Goal: Optimal Cognitive Function (Psychotic Signs/Symptoms)  Intervention: Support and Promote Cognitive Ability  Recent Flowsheet Documentation  Taken 5/18/2025 0320 by Eunice Sen, RN  Communication Support Strategies: active listening utilized   Goal Outcome Evaluation:             Pt. Presents with a tense and guarded affect, pleasant on approach. Pt. spent the first half of the shift resting in bed, declined breakfast. Pt visible in the lounge for lunch, adequate appetite. Pt. Avoids social contact, minimal engagement with peers. Pt speech is blunted with short answer responses.     Late afternoon pt. Requested to leave, agreed to wait until Monday to talk with the Dr. They are also interested in [facility] facility    Pt. Endorses anxiety and depression  5/10, denies SI/HI/SIB and hallucinations. Medication compliance, nicotine gum x 3, and contract for safety.     Blood pressure (!) 168/98, pulse 107, temperature 97.3  F (36.3  C), temperature source Oral, resp. rate 17, height 1.626 m (5' 4\"), weight 76.2 kg (168 lb), SpO2 97%.                   "

## 2025-05-18 NOTE — PLAN OF CARE
"  Rehab Group    Start time: 1700  End time: 1800  Patient time total: 43 minutes    attended full group    #8 attended   Group Type: occupational therapy   Group Topic Covered: cognitive activities, healthy leisure time, and social skills   Group Session Detail: OT: Education on healthy leisure activities and cognitive wellness with an interactive cooperative social activity ([employer]) to increase concentration, focus, attention to task/detail, memory recall, coping with stress, healthy distraction engagement, symptom management, healthy leisure engagement, social wellness, and cognitive wellness   Patient Response/Contribution: cooperative with task and actively engaged   Patient Detail: Pt reported during check-in that \"playing Connect 4\" is an activity they enjoy to support their cognitive wellness. Pt sat among peers to complete presented activity and engaged in social interactions with peers. Pt able to recall directions from a previous experience and was able to independently track the directions of play. Pt left group early and did not return.      [id number] OT Group (2 or more in attendance)    Patient Active Problem List   Diagnosis    CARDIOVASCULAR SCREENING; LDL GOAL LESS THAN 160    Cholecystitis    Suicidal ideation    Auditory hallucinations    Depression with anxiety    Schizoaffective disorder, depressive type (H)    ADHD (attention deficit hyperactivity disorder), inattentive type    Polysubstance use disorder    MDD (major depressive disorder)    Other migraine without status migrainosus, not intractable    Depression, unspecified depression type    Abnormal EKG    Anxiety    Dental caries    Bulimia nervosa (H)    Elevated blood pressure reading    Family history of hypertension    History of methamphetamine use    Hyperlipidemia    Hypertension    Microcytic anemia    Nicotine dependence    Opioid use disorder in remission    Body dysmorphic disorder    Depression, major, recurrent    Schizoaffective " disorder (H)    Encounter for monitoring opioid maintenance therapy    Schizophrenia (H)    Methamphetamine use (H)    Stimulant use disorder    Opioid use disorder    Methamphetamine use disorder, moderate (H)    Stimulant-induced psychotic disorder (H)    Alcohol dependence (H)    Suicidal thoughts    Alcohol use disorder    Borderline personality disorder (H)    Malingering    Unsheltered homelessness    Homelessness    Psychosis (H)    Acute psychosis (H)

## 2025-05-18 NOTE — PROVIDER NOTIFICATION
05/18/25 0600   Sleep/Rest   Night Time # Hours 7 hours     Pt had a quiet night with no behavioral or safety concerns. Pt was observed sleeping the entire night, no acute events noted or reported.

## 2025-05-18 NOTE — PLAN OF CARE
"Problem: Suicide Risk  Goal: Absence of Self-Harm  Outcome: Progressing     Problem: Psychotic Signs/Symptoms  Goal: Improved Mood Symptoms (Psychotic Signs/Symptoms)  Outcome: Progressing    Patient has been observed in the milieu through out the evening, socializing with staff and peers. They have been calm and polite on approach. Reported anxiety 7/10 at the start of the shift and requested PRN hydroxyzine, which was helpful. No safety or behavioral concerns noted on the unit. Patient denies SI/SIB/HI and hallucinations. No reports of pain or discomfort. They were out at dinner time. Appetite is good. Patient is medication compliant. Makes occasional requests for nicotine lozenges. No other PRN requests this shift.      BP (!) 146/96   Pulse 93   Temp 98.7  F (37.1  C) (Oral)   Resp 17   Ht 1.626 m (5' 4\")   Wt 76.2 kg (168 lb)   LMP  (LMP Unknown)   SpO2 99%   BMI 28.84 kg/m      "

## 2025-05-19 PROCEDURE — 124N000002 HC R&B MH UMMC

## 2025-05-19 PROCEDURE — 250N000013 HC RX MED GY IP 250 OP 250 PS 637: Performed by: PSYCHIATRY & NEUROLOGY

## 2025-05-19 PROCEDURE — 97150 GROUP THERAPEUTIC PROCEDURES: CPT | Mod: GO

## 2025-05-19 PROCEDURE — 99232 SBSQ HOSP IP/OBS MODERATE 35: CPT | Performed by: NURSE PRACTITIONER

## 2025-05-19 PROCEDURE — 250N000013 HC RX MED GY IP 250 OP 250 PS 637: Performed by: EMERGENCY MEDICINE

## 2025-05-19 PROCEDURE — 250N000013 HC RX MED GY IP 250 OP 250 PS 637: Performed by: NURSE PRACTITIONER

## 2025-05-19 RX ORDER — QUETIAPINE FUMARATE 25 MG/1
25-50 TABLET, FILM COATED ORAL 3 TIMES DAILY PRN
Status: DISCONTINUED | OUTPATIENT
Start: 2025-05-19 | End: 2025-05-21 | Stop reason: HOSPADM

## 2025-05-19 RX ORDER — POLYETHYLENE GLYCOL 3350 17 G
4 POWDER IN PACKET (EA) ORAL
Status: DISCONTINUED | OUTPATIENT
Start: 2025-05-19 | End: 2025-05-21 | Stop reason: HOSPADM

## 2025-05-19 RX ORDER — PROPRANOLOL HYDROCHLORIDE 10 MG/1
10 TABLET ORAL 3 TIMES DAILY PRN
Status: DISCONTINUED | OUTPATIENT
Start: 2025-05-19 | End: 2025-05-21 | Stop reason: HOSPADM

## 2025-05-19 RX ADMIN — HALOPERIDOL 5 MG: 5 TABLET ORAL at 22:04

## 2025-05-19 RX ADMIN — NICOTINE POLACRILEX 4 MG: 2 LOZENGE ORAL at 20:23

## 2025-05-19 RX ADMIN — NICOTINE POLACRILEX 4 MG: 2 LOZENGE ORAL at 17:33

## 2025-05-19 RX ADMIN — HALOPERIDOL 5 MG: 5 TABLET ORAL at 08:00

## 2025-05-19 RX ADMIN — NICOTINE POLACRILEX 4 MG: 2 LOZENGE ORAL at 16:14

## 2025-05-19 RX ADMIN — NICOTINE POLACRILEX 4 MG: 2 LOZENGE ORAL at 14:35

## 2025-05-19 RX ADMIN — LOSARTAN POTASSIUM 50 MG: 25 TABLET, FILM COATED ORAL at 08:00

## 2025-05-19 RX ADMIN — NICOTINE POLACRILEX 2 MG: 2 LOZENGE ORAL at 09:33

## 2025-05-19 RX ADMIN — NICOTINE POLACRILEX 2 MG: 2 LOZENGE ORAL at 10:59

## 2025-05-19 RX ADMIN — QUETIAPINE FUMARATE 50 MG: 25 TABLET ORAL at 12:41

## 2025-05-19 RX ADMIN — OLANZAPINE 10 MG: 10 TABLET, FILM COATED ORAL at 22:05

## 2025-05-19 RX ADMIN — NICOTINE 1 PATCH: 14 PATCH, EXTENDED RELEASE TRANSDERMAL at 08:00

## 2025-05-19 RX ADMIN — NICOTINE POLACRILEX 4 MG: 2 LOZENGE ORAL at 11:55

## 2025-05-19 RX ADMIN — VENLAFAXINE HYDROCHLORIDE 75 MG: 75 CAPSULE, EXTENDED RELEASE ORAL at 08:01

## 2025-05-19 RX ADMIN — NICOTINE POLACRILEX 4 MG: 2 LOZENGE ORAL at 18:58

## 2025-05-19 RX ADMIN — NICOTINE POLACRILEX 4 MG: 2 LOZENGE ORAL at 13:13

## 2025-05-19 RX ADMIN — NICOTINE POLACRILEX 4 MG: 2 LOZENGE ORAL at 22:05

## 2025-05-19 ASSESSMENT — ACTIVITIES OF DAILY LIVING (ADL)
ADLS_ACUITY_SCORE: 40
ADLS_ACUITY_SCORE: 40
ORAL_HYGIENE: INDEPENDENT
ADLS_ACUITY_SCORE: 40
ADLS_ACUITY_SCORE: 40
LAUNDRY: WITH SUPERVISION
ADLS_ACUITY_SCORE: 40
DRESS: INDEPENDENT
ADLS_ACUITY_SCORE: 40
HYGIENE/GROOMING: INDEPENDENT
ADLS_ACUITY_SCORE: 40

## 2025-05-19 NOTE — PLAN OF CARE
"  Problem: Adult Inpatient Plan of Care  Goal: Optimal Comfort and Wellbeing  Intervention: Provide Person-Centered Care  Recent Flowsheet Documentation  Taken 5/19/2025 0921 by Eunice Sen RN  Trust Relationship/Rapport:   choices provided   care explained   reassurance provided   thoughts/feelings acknowledged   Goal Outcome Evaluation:    Plan of Care Reviewed With: (P) patient             Pt in and out of the lounge engaged with selective staff, Pt has agreed to stay in the hospital until [facility] placement. Pt presents with a bright and tense affect, pleasant on approach. Pt. Out in the lounge for breakfast and lunch adequate appetite. Pt reports feeling anxious and agitated requesting Seroquel, effective spent the afternoon resting in bed.      Speech is clear and coherent, maintains eye contact, thought process is linear and organized, hygiene is well kempt.     Pt endorses high anxiety and low depression, denies SI/HI/SIB and hallucinations. Medication compliance frequent request for nicotine Lozenges, and contracted for safety.       Blood pressure (!) 155/105, pulse 102, temperature 97.9  F (36.6  C), temperature source Oral, resp. rate 18, height 1.626 m (5' 4\"), weight 76.2 kg (168 lb), SpO2 98%.        "

## 2025-05-19 NOTE — PLAN OF CARE
BEH IP Unit Acuity Rating Score (UARS)  Patient is given one point for every criteria they meet.    CRITERIA SCORING   On a 72 hour hold, court hold, committed, stay of commitment, or revocation. 0    Patient LOS on BEH unit exceeds 20 days. 0  LOS: 7   Patient under guardianship, 55+, otherwise medically complex, or under age 11. 0   Suicide ideation without relief of precipitating factors. 1   Current plan for suicide. 0   Current plan for homicide. 0   Imminent risk or actual attempt to seriously harm another without relief of factors precipitating the attempt. 0   Severe dysfunction in daily living (ex: complete neglect for self care, extreme disruption in vegetative function, extreme deterioration in social interactions). 1   Recent (last 7 days) or current physical aggression in the ED or on unit. 0   Restraints or seclusion episode in past 72 hours. 0   Recent (last 7 days) or current verbal aggression, agitation, yelling, etc., while in the ED or unit. 0   Active psychosis. 0   Need for constant or near constant redirection (from leaving, from others, etc).  0   Intrusive or disruptive behaviors. 0   Patient requires 3 or more hours of individualized nursing care per 8-hour shift (i.e. for ADLs, meds, therapeutic interventions). 0   TOTAL 2

## 2025-05-19 NOTE — PROVIDER NOTIFICATION
05/19/25 0608   Sleep/Rest   Night Time # Hours 6.5 hours     Pt had a quiet night with no behavioral or safety concerns. Pt was observed sleeping almost the entire night, no acute events noted or reported.

## 2025-05-19 NOTE — PLAN OF CARE
"  Rehab Group    Start time: 11:10  End time: 11:50  Patient time total: 35 minutes    attended partial group    #5 attended   Group Type: occupational therapy   Group Topic Covered: cognitive activities, healthy leisure time, self-esteem, and social skills, fine motor skills       Group Session Detail:  Patient engaged in a fine motor-based leisure activity ([employer]) with incorporated discussion prompts. Therapeutic benefits and skills addressed during today's leisure activity include: problem solving, promoting attention/focus, improving social skills, exercise of fine motor skills, building self-esteem, and exploring healthy leisure opportunities.           Patient Response/Contribution:  cooperative with task, organized, socially appropriate, attentive, and actively engaged      Patient Detail:  Patient arrived to this group somewhat late. Upon arrival, patient expressed some familiarity with the selected activity and appeared to have a good understanding of game concepts after instructions were reviewed. Patient demonstrated fair attention during the group activity as they were observed to close their eyes at times. Patient required prompting on one occasion to remind them to stack game blocks back on top of the [employer] tower. Patient did not demonstrate difficulty grasping and manipulating game blocks. Patient appeared to utilize appropriate strategy during the game. Patient also appeared comfortable answering game prompts. When prompted to identify who they can talk to when feeling overwhelmed, patient stated \"my friends and staff here\" (referring to the inpatient unit) and \"family.\" When prompted to identify something that makes them feel discouraged/upset, patient stated \"hate speech.\" Patient assisted in cleaning game supplies at the end of the group. Affect appeared somewhat blunted; however, patient remained calm and cooperative during this group. Patient expressed enjoying the group activity prior to " leaving the group room.       [id number] OT Group (2 or more in attendance)      Patient Active Problem List   Diagnosis    CARDIOVASCULAR SCREENING; LDL GOAL LESS THAN 160    Cholecystitis    Suicidal ideation    Auditory hallucinations    Depression with anxiety    Schizoaffective disorder, depressive type (H)    ADHD (attention deficit hyperactivity disorder), inattentive type    Polysubstance use disorder    MDD (major depressive disorder)    Other migraine without status migrainosus, not intractable    Depression, unspecified depression type    Abnormal EKG    Anxiety    Dental caries    Bulimia nervosa (H)    Elevated blood pressure reading    Family history of hypertension    History of methamphetamine use    Hyperlipidemia    Hypertension    Microcytic anemia    Nicotine dependence    Opioid use disorder in remission    Body dysmorphic disorder    Depression, major, recurrent    Schizoaffective disorder (H)    Encounter for monitoring opioid maintenance therapy    Schizophrenia (H)    Methamphetamine use (H)    Stimulant use disorder    Opioid use disorder    Methamphetamine use disorder, moderate (H)    Stimulant-induced psychotic disorder (H)    Alcohol dependence (H)    Suicidal thoughts    Alcohol use disorder    Borderline personality disorder (H)    Malingering    Unsheltered homelessness    Homelessness    Psychosis (H)    Acute psychosis (H)

## 2025-05-19 NOTE — PLAN OF CARE
"  Rehab Group    Start time: 10:15  End time: 11:00  Patient time total: 25 minutes    attended partial group    #6 attended   Group Type: OT Clinic   Group Topic Covered: balanced lifestyle, coping skills, healthy leisure time, relaxation , and social skills       Group Session Detail:  Pt actively participated in occupational therapy clinic to facilitate coping skill exploration, creative expression within personally meaningful activities, and clinical observation of social, cognitive, and kinesthetic performance skills.       Patient Response/Contribution:  cooperative with task, organized, socially appropriate, attentive, and actively engaged     Patient Detail:  Pt response: Patient arrived to this group on time and independently selected to complete a pencil pouch decorating project and small fuzzy color task. Upon approach, patient expressed feeling \"pretty good\" today. Patient was independent to initiate, gather materials, sequence, and adjust to workspace demands as needed. Demonstrated fair/good focus, planning, and problem solving for selected creative tasks as patient worked on the projects in an organized and goal-oriented manner; however, opted to leave group after roughly twenty-five minutes of participation. Able to ask for assistance as needed, and socialized appropriately with peers and staff on occasion. Patient offered a song suggestion for peers on one occasion and appeared to enjoy listening to music with peers. Patient independently initiated and completed clean up of some task supplies prior to leaving group early. Affect appeared somewhat blunted; however, patient remained calm and cooperative during this group.       [id number] OT Group (2 or more in attendance)      Patient Active Problem List   Diagnosis    CARDIOVASCULAR SCREENING; LDL GOAL LESS THAN 160    Cholecystitis    Suicidal ideation    Auditory hallucinations    Depression with anxiety    Schizoaffective disorder, depressive type (H) "    ADHD (attention deficit hyperactivity disorder), inattentive type    Polysubstance use disorder    MDD (major depressive disorder)    Other migraine without status migrainosus, not intractable    Depression, unspecified depression type    Abnormal EKG    Anxiety    Dental caries    Bulimia nervosa (H)    Elevated blood pressure reading    Family history of hypertension    History of methamphetamine use    Hyperlipidemia    Hypertension    Microcytic anemia    Nicotine dependence    Opioid use disorder in remission    Body dysmorphic disorder    Depression, major, recurrent    Schizoaffective disorder (H)    Encounter for monitoring opioid maintenance therapy    Schizophrenia (H)    Methamphetamine use (H)    Stimulant use disorder    Opioid use disorder    Methamphetamine use disorder, moderate (H)    Stimulant-induced psychotic disorder (H)    Alcohol dependence (H)    Suicidal thoughts    Alcohol use disorder    Borderline personality disorder (H)    Malingering    Unsheltered homelessness    Homelessness    Psychosis (H)    Acute psychosis (H)

## 2025-05-19 NOTE — PLAN OF CARE
Team Note Due:  Wednesday     Assessment/Intervention/Current Symtoms and Care Coordination:  Chart review and met with team, discussed pt progress, symptomology, and response to treatment.  Discussed the discharge plan and any potential impediments to discharge.    In team it was discussed that Cam wants to leave but can stay as well.  RN reports their blood pressure was high.  Discussion of [facility] calling but they are a smoke-free tobacco facility.  She reports smoking over a pack of cigarettes a day.      Met with Cam several times and they are anxious about being here.  When asked if they have a safe place to go they said no.  RN reported to writer that they said they want to use meth.  [facility] suggested they go to [facility] since they have a heavy substance use history.      [facility].  They said they will follow up by EOD tomorrow.     [employer] interview tomorrow @ 1100.    Discharge Plan or Goal:  [facility]     Barriers to Discharge:  Severity of symptoms  Housing security  Limited social support     Referral Status:  [employer] 5/16/2025  [facility] 5/16/2025  [facility] 5/16/2025     Legal Status:  Voluntary      Contacts (include DEEDEE status):  [facility]  Mary Castro (Mom): [phone] [facility] Program:   Caity Lau   Phone Number: [phone]  Email: [email or web address].     Upcoming Meetings and Dates/Important Information and next steps:  [employer] interview tomorrow 5/20/2025 @ 1100.

## 2025-05-19 NOTE — PLAN OF CARE
"  Rehab Group    Start time: 13:15  End time: 13:55  Patient time total: 20 minutes    attended partial group    #6 attended   Group Type: occupational therapy   Group Topic Covered: cognitive activities, coping skills, emotional regulation, healthy leisure time, mindfulness, and social skills       Group Session Detail:  Patient engaged in an interactive game of [employer] to promote and develop social skills, coping skills skills, healthy leisure exploration, and cognitive activity. Group participants were instructed to accurately recall information to answer game questions pertaining to geography, science, history, pop culture, and Dialectical Behavior Therapy (DBT). Participants were also educated on the four main pillars of DBT (mindfulness, emotional regulation, interpersonal effectiveness, and distress tolerance).      Patient Response/Contribution:  cooperative with task, organized, socially appropriate, and actively engaged     Patient Detail:  Patient Response: Patient arrived to this group late; however, was an active participant in the group activity. During the group game of sara, patient demonstrated fair focus as they were observed closing their eyes at times during the group game. Despite this, patient appeared comfortable answering game questions throughout the activity. When prompted to identify 2-3 characteristics of a healthy relationship, patient stated \"trust, a shared sense of humor, and honesty.\" Patient assisted peers at times throughout the activity, though elected to leave group somewhat early. Affect appeared somewhat blunted; however, patient remained calm and cooperative during this group.       [id number] OT Group (2 or more in attendance)      Patient Active Problem List   Diagnosis    CARDIOVASCULAR SCREENING; LDL GOAL LESS THAN 160    Cholecystitis    Suicidal ideation    Auditory hallucinations    Depression with anxiety    Schizoaffective disorder, depressive type (H)    ADHD " (attention deficit hyperactivity disorder), inattentive type    Polysubstance use disorder    MDD (major depressive disorder)    Other migraine without status migrainosus, not intractable    Depression, unspecified depression type    Abnormal EKG    Anxiety    Dental caries    Bulimia nervosa (H)    Elevated blood pressure reading    Family history of hypertension    History of methamphetamine use    Hyperlipidemia    Hypertension    Microcytic anemia    Nicotine dependence    Opioid use disorder in remission    Body dysmorphic disorder    Depression, major, recurrent    Schizoaffective disorder (H)    Encounter for monitoring opioid maintenance therapy    Schizophrenia (H)    Methamphetamine use (H)    Stimulant use disorder    Opioid use disorder    Methamphetamine use disorder, moderate (H)    Stimulant-induced psychotic disorder (H)    Alcohol dependence (H)    Suicidal thoughts    Alcohol use disorder    Borderline personality disorder (H)    Malingering    Unsheltered homelessness    Homelessness    Psychosis (H)    Acute psychosis (H)
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
## 2025-05-20 VITALS
BODY MASS INDEX: 28.68 KG/M2 | OXYGEN SATURATION: 99 % | WEIGHT: 168 LBS | DIASTOLIC BLOOD PRESSURE: 99 MMHG | TEMPERATURE: 98 F | HEIGHT: 64 IN | RESPIRATION RATE: 16 BRPM | SYSTOLIC BLOOD PRESSURE: 149 MMHG | HEART RATE: 88 BPM

## 2025-05-20 PROCEDURE — 124N000002 HC R&B MH UMMC

## 2025-05-20 PROCEDURE — 250N000013 HC RX MED GY IP 250 OP 250 PS 637: Performed by: NURSE PRACTITIONER

## 2025-05-20 PROCEDURE — 250N000013 HC RX MED GY IP 250 OP 250 PS 637: Performed by: PSYCHIATRY & NEUROLOGY

## 2025-05-20 PROCEDURE — 250N000013 HC RX MED GY IP 250 OP 250 PS 637

## 2025-05-20 PROCEDURE — 99232 SBSQ HOSP IP/OBS MODERATE 35: CPT | Performed by: NURSE PRACTITIONER

## 2025-05-20 PROCEDURE — H2032 ACTIVITY THERAPY, PER 15 MIN: HCPCS

## 2025-05-20 PROCEDURE — 250N000013 HC RX MED GY IP 250 OP 250 PS 637: Performed by: EMERGENCY MEDICINE

## 2025-05-20 RX ORDER — EPINEPHRINE 0.3 MG/.3ML
0.3 INJECTION SUBCUTANEOUS DAILY PRN
Qty: 2 EACH | Refills: 0 | Status: SHIPPED | OUTPATIENT
Start: 2025-05-20

## 2025-05-20 RX ORDER — LOSARTAN POTASSIUM 25 MG/1
50 TABLET ORAL DAILY
Status: DISCONTINUED | OUTPATIENT
Start: 2025-05-20 | End: 2025-05-20

## 2025-05-20 RX ORDER — LOSARTAN POTASSIUM 25 MG/1
50 TABLET ORAL ONCE
Status: COMPLETED | OUTPATIENT
Start: 2025-05-20 | End: 2025-05-20

## 2025-05-20 RX ORDER — VENLAFAXINE HYDROCHLORIDE 75 MG/1
75 CAPSULE, EXTENDED RELEASE ORAL
Qty: 30 CAPSULE | Refills: 0 | Status: SHIPPED | OUTPATIENT
Start: 2025-05-21

## 2025-05-20 RX ORDER — HALOPERIDOL 5 MG/1
5 TABLET ORAL 2 TIMES DAILY
Qty: 60 TABLET | Refills: 0 | Status: SHIPPED | OUTPATIENT
Start: 2025-05-20

## 2025-05-20 RX ORDER — OLANZAPINE 10 MG/1
10 TABLET, FILM COATED ORAL AT BEDTIME
Qty: 30 TABLET | Refills: 0 | Status: SHIPPED | OUTPATIENT
Start: 2025-05-20

## 2025-05-20 RX ORDER — LOSARTAN POTASSIUM 100 MG/1
100 TABLET ORAL DAILY
Qty: 30 TABLET | Refills: 0 | Status: SHIPPED | OUTPATIENT
Start: 2025-05-21

## 2025-05-20 RX ORDER — LOSARTAN POTASSIUM 25 MG/1
100 TABLET ORAL DAILY
Status: DISCONTINUED | OUTPATIENT
Start: 2025-05-21 | End: 2025-05-21 | Stop reason: HOSPADM

## 2025-05-20 RX ORDER — HYDROXYZINE HYDROCHLORIDE 25 MG/1
25 TABLET, FILM COATED ORAL EVERY 4 HOURS PRN
Qty: 60 TABLET | Refills: 0 | Status: SHIPPED | OUTPATIENT
Start: 2025-05-20

## 2025-05-20 RX ORDER — ACETAMINOPHEN 325 MG/1
650 TABLET ORAL EVERY 4 HOURS PRN
Qty: 30 TABLET | Refills: 0 | Status: SHIPPED | OUTPATIENT
Start: 2025-05-20

## 2025-05-20 RX ORDER — OLANZAPINE 10 MG/1
10 TABLET, FILM COATED ORAL 3 TIMES DAILY PRN
Qty: 30 TABLET | Refills: 0 | Status: SHIPPED | OUTPATIENT
Start: 2025-05-20

## 2025-05-20 RX ORDER — QUETIAPINE FUMARATE 25 MG/1
25-50 TABLET, FILM COATED ORAL 3 TIMES DAILY PRN
Qty: 60 TABLET | Refills: 0 | Status: SHIPPED | OUTPATIENT
Start: 2025-05-20

## 2025-05-20 RX ORDER — NICOTINE 21 MG/24HR
1 PATCH, TRANSDERMAL 24 HOURS TRANSDERMAL DAILY
Qty: 30 PATCH | Refills: 0 | Status: SHIPPED | OUTPATIENT
Start: 2025-05-21

## 2025-05-20 RX ORDER — POLYETHYLENE GLYCOL 3350 17 G
4 POWDER IN PACKET (EA) ORAL
Qty: 108 LOZENGE | Refills: 0 | Status: SHIPPED | OUTPATIENT
Start: 2025-05-20

## 2025-05-20 RX ADMIN — HALOPERIDOL 5 MG: 5 TABLET ORAL at 21:50

## 2025-05-20 RX ADMIN — OLANZAPINE 10 MG: 10 TABLET, FILM COATED ORAL at 21:50

## 2025-05-20 RX ADMIN — LOSARTAN POTASSIUM 50 MG: 25 TABLET, FILM COATED ORAL at 09:44

## 2025-05-20 RX ADMIN — NICOTINE POLACRILEX 4 MG: 2 LOZENGE ORAL at 11:31

## 2025-05-20 RX ADMIN — NICOTINE POLACRILEX 4 MG: 2 LOZENGE ORAL at 20:39

## 2025-05-20 RX ADMIN — NICOTINE POLACRILEX 4 MG: 2 LOZENGE ORAL at 10:02

## 2025-05-20 RX ADMIN — QUETIAPINE FUMARATE 50 MG: 25 TABLET ORAL at 13:41

## 2025-05-20 RX ADMIN — HYDROXYZINE HYDROCHLORIDE 25 MG: 25 TABLET, FILM COATED ORAL at 13:05

## 2025-05-20 RX ADMIN — NICOTINE POLACRILEX 4 MG: 2 LOZENGE ORAL at 13:11

## 2025-05-20 RX ADMIN — NICOTINE 1 PATCH: 14 PATCH, EXTENDED RELEASE TRANSDERMAL at 09:44

## 2025-05-20 RX ADMIN — VENLAFAXINE HYDROCHLORIDE 75 MG: 75 CAPSULE, EXTENDED RELEASE ORAL at 09:44

## 2025-05-20 RX ADMIN — Medication 12.5 MG: at 09:59

## 2025-05-20 RX ADMIN — HALOPERIDOL 5 MG: 5 TABLET ORAL at 09:44

## 2025-05-20 RX ADMIN — NICOTINE POLACRILEX 4 MG: 2 LOZENGE ORAL at 19:24

## 2025-05-20 RX ADMIN — NICOTINE POLACRILEX 4 MG: 2 LOZENGE ORAL at 21:50

## 2025-05-20 ASSESSMENT — ACTIVITIES OF DAILY LIVING (ADL)
ADLS_ACUITY_SCORE: 40

## 2025-05-20 NOTE — PLAN OF CARE
Problem: Psychotic Signs/Symptoms  Goal: Improved Behavioral Control (Psychotic Signs/Symptoms)  Outcome: Progressing   Goal Outcome Evaluation:    Plan of Care Reviewed With: patient      Patient goes by they/them. Patient alert and oriented x 4. They presented with flat affect. Mood was calm. They denied pain and all mental health symptoms. Patient was visible in the milieu and was seen talking to select patients. Patient's BP was high in the morning. Patient denied headache, nausea, chest pain. PRN hydralazine given. Patient went down to 160/109 mmHg. IM notified. Additional one time dose of cozaar was ordered but patient declined to take medication. Patient said that their BP had always been high. Rechecked BP again = 149/99. Continued monitoring.    Patient c/o unrelieved anxiety and worsening auditory hallucinations. Patient declined to discuss what the voices are telling them. PRN given. Warm compress and lavender patch given.    PRNs given:    Hydroxyzine - for anxiety (ineffective per patient)  Seroquel - for anxiety and auditory hallucinations

## 2025-05-20 NOTE — PLAN OF CARE
Problem: Psychotic Signs/Symptoms  Goal: Improved Mood Symptoms (Psychotic Signs/Symptoms)  Outcome: Progressing   Goal Outcome Evaluation:    Plan of Care Reviewed With: patient      Pt presents as calm. Pt spends half of the evening sleeping. Pt was then up in the lounge to eat dinner and watch basketball game. Pt did not report any mental health symptoms this shift. No SI/SIB/HI or other safety concerns. Pt appeared upbeat and pleasant when they woke up. Pt ready to discharge tomorrow. Discharge medications in med room. Pt refused BP check later in the shift.     Pt given prn nicotine lozenge x3. Pt took all scheduled evening medications with no issues.

## 2025-05-20 NOTE — PROGRESS NOTES
"  Rehab Group    Start time: 1315  End time: 1415  Patient time total: 30 minutes    attended partial group    #5 attended   Group Type: art   Group Topic Covered: activity therapy       Group Session Detail:  Art Therapy directive(s);  \"Best Possible Resilient Self:\" Self visualization of challenging event-overcoming obstacle-drawing self symbol of most resilient self or  Drawing of \"a living being that survives in a harsh environment.\"  Goals of directive: expressing aspects of self/identity, identifying personal strengths and goals, emotional expression, emotional regulation     Patient Response/Contribution:  cooperative with task       Patient Detail:    Pt was an engaged participant, focused on task for the time that pt attended group (30 minutes). Pt left group before sharing/processing artwork with author or group. Author will further check in with pt during next AT group.  Pts mood was calm.      Activity Therapy Per 15 min ()      Patient Active Problem List   Diagnosis    CARDIOVASCULAR SCREENING; LDL GOAL LESS THAN 160    Cholecystitis    Suicidal ideation    Auditory hallucinations    Depression with anxiety    Schizoaffective disorder, depressive type (H)    ADHD (attention deficit hyperactivity disorder), inattentive type    Polysubstance use disorder    MDD (major depressive disorder)    Other migraine without status migrainosus, not intractable    Depression, unspecified depression type    Abnormal EKG    Anxiety    Dental caries    Bulimia nervosa (H)    Elevated blood pressure reading    Family history of hypertension    History of methamphetamine use    Hyperlipidemia    Hypertension    Microcytic anemia    Nicotine dependence    Opioid use disorder in remission    Body dysmorphic disorder    Depression, major, recurrent    Schizoaffective disorder (H)    Encounter for monitoring opioid maintenance therapy    Schizophrenia (H)    Methamphetamine use (H)    Stimulant use disorder    Opioid " use disorder    Methamphetamine use disorder, moderate (H)    Stimulant-induced psychotic disorder (H)    Alcohol dependence (H)    Suicidal thoughts    Alcohol use disorder    Borderline personality disorder (H)    Malingering    Unsheltered homelessness    Homelessness    Psychosis (H)    Acute psychosis (H)

## 2025-05-20 NOTE — PROGRESS NOTES
"Brief Medicine Note    BP (!) 160/109 (BP Location: Right arm, Patient Position: Sitting, Cuff Size: Adult Regular)   Pulse 78   Temp 98  F (36.7  C) (Oral)   Resp 16   Ht 1.626 m (5' 4\")   Wt 76.2 kg (168 lb)   LMP  (LMP Unknown)   SpO2 99%   BMI 28.84 kg/m        Medicine following HTN. Bedside nurse notified writer of elevated BP this morning 170/111. Patient received hydralazine with repeat /109. Patient asymptomatic. Reviewed BP over past several days which remains elevated despite losartan 50 mg daily (verified in MAR it was given).   -Losartan 50 mg x 1 dose now  -Increase losartan to 100 mg daily starting 5/21    Medicine will continue to peripherally follow BP.     Mague Mccartney PA-C  Internal Medicine [facility] Hospitalist  [facility]    "

## 2025-05-20 NOTE — PLAN OF CARE
Care Coordinator Note(s):    Care Request(s):   Therapy  Preferences: Time Frame: 2 Weeks, Virtual  Notes: Pt discharging to IR tomorrow @ 11:30 am.  Does not have a current Psychiatrist.        Care Outcome(s):    CC Progress Note(s)/ Documentation:      Appointment: Therapy  Date/time: Tuesday June 3rd, 2025 @ 2:00 pm Virtual  Provider:  Gauri CHRIS CNP,PMHNP,RN  Address: [facility], [address] [place] [address], [address], [place] [address]  Phone: [phone]  Fax: [phone]  Note:   *Due to high demand, your appt date & time is only guaranteed after you speak with [facility] intake. Our intake team will attempt to contact you. If you do not hear from them within 24 hours, please call them at [phone] and tell them you are a P referral. If you do not speak with our Intake Department and complete the necessary paperwork they send you, we cannot see you at your scheduled appointment time.       -Jessica Chong  Adult Behavioral Health Care Coordinator

## 2025-05-20 NOTE — PROGRESS NOTES
"[facility], [place]   Psychiatric Progress Note        Interim History:   Team meeting report: The patient's care was discussed with the treatment team during the daily team meeting and/or staff's chart notes were reviewed.  Staff reports the patient has been calm, pleasant, and cooperative.  Reported feeling \"a little agitated\".  Affect was flat.  The patient was visible in the milieu and watched TV with the rest of the patients.  Minimal interactions with others.  Reported some depression and anxiety.  Denied everything else.  In the morning, the patient was out in the milieu.  Social with staff and peers.  Attended groups.  Reports feeling anxious and agitated and requested as needed Seroquel.  This was helpful.  She slept the rest of the afternoon.  No behavioral issues.  Med compliant.  Slept through the night.    Met with patient.  The patient is in bed.  They are smiling.  They are reporting having a good mood.  That they attended some groups yesterday.  Denies hallucinations, paranoia, delusions.  They are eating and sleeping well.  They know they have an interview today with [facility].  They are hoping to discharge sometimes this week.  No other questions or concerns.    Per , the patient was accepted at [facility] and will be discharging tomorrow.  Medications were ordered.         Medications:     Current Facility-Administered Medications   Medication Dose Route Frequency Provider Last Rate Last Admin    haloperidol (HALDOL) tablet 5 mg  5 mg Oral BID Arturo Lafleur MD   5 mg at 05/20/25 0944    [START ON 5/21/2025] losartan (COZAAR) tablet 100 mg  100 mg Oral Daily Mague Mccartney PA-C        nicotine (NICODERM CQ) 14 MG/24HR 24 hr patch 1 patch  1 patch Transdermal Daily Lilo Carreon MD   1 patch at 05/20/25 0944    OLANZapine (zyPREXA) tablet 10 mg  10 mg Oral At Bedtime Miki Olguin MD   10 mg at 05/19/25 2205    venlafaxine (EFFEXOR XR) 24 hr " capsule 75 mg  75 mg Oral Daily with breakfast Arturo Lafleur MD   75 mg at 05/20/25 0944            Allergies:     Allergies   Allergen Reactions    Bee Venom Anaphylaxis     14 year's ago anaphylaxis went to hospital,  Did have positive venom skin testing    Wasp Venom Protein Anaphylaxis    Sulfa Antibiotics Rash            Labs:     Recent Results (from the past 4 weeks)   HCG qualitative urine    Collection Time: 05/12/25 10:57 AM   Result Value Ref Range    hCG Urine Qualitative Negative Negative   Urine Drug Screen Panel    Collection Time: 05/12/25 10:57 AM   Result Value Ref Range    Amphetamines Urine Screen Negative Screen Negative    Barbituates Urine Screen Negative Screen Negative    Benzodiazepine Urine Screen Negative Screen Negative    Cannabinoids Urine Screen Negative Screen Negative    Cocaine Urine Screen Negative Screen Negative    Fentanyl Qual Urine Screen Negative Screen Negative    Opiates Urine Screen Negative Screen Negative    PCP Urine Screen Negative Screen Negative   Comprehensive metabolic panel    Collection Time: 05/12/25 12:29 PM   Result Value Ref Range    Sodium 142 135 - 145 mmol/L    Potassium 3.7 3.4 - 5.3 mmol/L    Carbon Dioxide (CO2) 24 22 - 29 mmol/L    Anion Gap 12 7 - 15 mmol/L    Urea Nitrogen 7.2 6.0 - 20.0 mg/dL    Creatinine 0.99 0.51 - 1.17 mg/dL    GFR Estimate 73 >60 mL/min/1.73m2    Calcium 9.0 8.8 - 10.4 mg/dL    Chloride 106 98 - 107 mmol/L    Glucose 95 70 - 99 mg/dL    Alkaline Phosphatase 76 40 - 150 U/L    AST 18 0 - 45 U/L    ALT 25 0 - 70 U/L    Protein Total 6.8 6.4 - 8.3 g/dL    Albumin 4.3 3.5 - 5.2 g/dL    Bilirubin Total 0.3 <=1.2 mg/dL   TSH with free T4 reflex    Collection Time: 05/12/25 12:29 PM   Result Value Ref Range    TSH 1.17 0.30 - 4.20 uIU/mL   CBC with platelets and differential    Collection Time: 05/12/25 12:29 PM   Result Value Ref Range    WBC Count 9.2 4.0 - 11.0 10e3/uL    RBC Count 5.11 3.80 - 5.90 10e6/uL    Hemoglobin  13.0 11.7 - 17.7 g/dL    Hematocrit 40.5 35.0 - 53.0 %    MCV 79 78 - 100 fL    MCH 25.4 (L) 26.5 - 33.0 pg    MCHC 32.1 31.5 - 36.5 g/dL    RDW 14.7 10.0 - 15.0 %    Platelet Count 285 150 - 450 10e3/uL    % Neutrophils 75 %    % Lymphocytes 18 %    % Monocytes 5 %    % Eosinophils 1 %    % Basophils 0 %    % Immature Granulocytes 0 %    NRBCs per 100 WBC 0 <1 /100    Absolute Neutrophils 6.9 1.6 - 8.3 10e3/uL    Absolute Lymphocytes 1.7 0.8 - 5.3 10e3/uL    Absolute Monocytes 0.5 0.0 - 1.3 10e3/uL    Absolute Eosinophils 0.1 0.0 - 0.7 10e3/uL    Absolute Basophils 0.0 0.0 - 0.2 10e3/uL    Absolute Immature Granulocytes 0.0 <=0.4 10e3/uL    Absolute NRBCs 0.0 10e3/uL   EKG 12-lead, complete    Collection Time: 05/13/25  3:37 PM   Result Value Ref Range    Systolic Blood Pressure  mmHg    Diastolic Blood Pressure  mmHg    Ventricular Rate 73 BPM    Atrial Rate 73 BPM    WV Interval 126 ms    QRS Duration 96 ms     ms    QTc 418 ms    P Axis 6 degrees    R AXIS 32 degrees    T Axis 2 degrees    Interpretation ECG       Sinus rhythm  Minimal voltage criteria for LVH, may be normal variant ( Roy product )  Nonspecific T wave abnormality  Abnormal ECG    Unconfirmed report - interpretation of this ECG is computer generated - see medical record for final interpretation              Precautions:     Behavioral Orders   Procedures    Assault precautions    Code 1 - Restrict to Unit    Routine Programming     As clinically indicated    Status 15     Every 15 minutes.    Suicide precautions: Suicide Risk: HIGH     Patients on Suicide Precautions should have a Combination Diet ordered that includes a Diet selection(s) AND a Behavioral Tray selection for Safe Tray - with utensils, or Safe Tray - NO utensils       Suicide Risk:   HIGH            Psychiatric Examination:   Temp: 98  F (36.7  C) Temp src: Oral BP: (!) 149/99 Pulse: 88   Resp: 16 SpO2: 99 % O2 Device: None (Room air)    Weight is 168 lbs 0 oz  Body mass  index is 28.84 kg/m .    Appearance: awake, alert and adequately groomed  Attitude:  cooperative  Eye Contact:  good  Mood:  anxious, depressed, and better  Affect:  appropriate and in normal range  Speech:  clear, coherent  Psychomotor Behavior:  no evidence of tardive dyskinesia, dystonia, or tics  Throught Process:  logical and goal oriented  Associations:  no loose associations  Thought Content:  no evidence of suicidal ideation or homicidal ideation, no auditory hallucinations present, and no visual hallucinations present  Insight:  fair  Judgement:  fair  Oriented to:  time, person, and place  Attention Span and Concentration:  fair  Recent and Remote Memory:  fair         Precautions:     Behavioral Orders   Procedures    Assault precautions    Code 1 - Restrict to Unit    Routine Programming     As clinically indicated    Status 15     Every 15 minutes.    Suicide precautions: Suicide Risk: HIGH     Patients on Suicide Precautions should have a Combination Diet ordered that includes a Diet selection(s) AND a Behavioral Tray selection for Safe Tray - with utensils, or Safe Tray - NO utensils       Suicide Risk:   HIGH          DIagnoses:   Schizoaffective disorder bipolar type, current episode manic, severe, with psychosis  Borderline personality disorder  Antisocial personality disorder  ADHD, per history  JOHANNA  Polysubstance abuse  Nicotine dependence         Plan:   Medications:  -- Haldol, 5 mg twice a day for psychosis, anxiety and mood stabilization  -- Zyprexa, 10 mg at bedtime for psychosis and sleep  -- Venlafaxine, 75 mg every morning for depression and anxiety  -- Nicotine patch, 50 mg every morning.  -- As needed medications are available    Medical:  --Internal medicine to follow up for medical problems     Labs: Reviewed and unremarkable.    EKG: Unremarkable    Other:  --Care was coordinated with the treatment team.   --The patient was consulted on nature of illness and treatment options.       Disposition Plan   Reason for ongoing admission: poses an imminent risk to self  Discharge location: [facility] facility  Discharge Medications: ordered.  Follow-up Appointments: not scheduled  Legal Status: voluntary   Discharge will be granted once symptoms improved.  The patient will be discharging to [facility] tomorrow.    Emily ELIZABETH, CNP    This note was created with the help of Dragon dictation system. All grammatical/typing errors or context distortion are unintentional and inherent to software.

## 2025-05-20 NOTE — PLAN OF CARE
Problem: Adult Behavioral Health Plan of Care  Goal: Adheres to Safety Considerations for Self and Others  Outcome: Progressing     Problem: Suicide Risk  Goal: Absence of Self-Harm  Outcome: Progressing     Problem: Psychotic Signs/Symptoms  Goal: Improved Behavioral Control (Psychotic Signs/Symptoms)  Outcome: Progressing   Goal Outcome Evaluation:  Remained in room calm, and quietly slept most of time this shift. No pain,discomfort or psych symptoms reported or observed this shift. Patient slept for 7  hours this shift. No other concerns at this time, team will continue to implement plan of care.

## 2025-05-20 NOTE — PLAN OF CARE
"Mood and Affect: Patient describes mood as \"A little agitated.\" Affect is blunted and flat.    LOC and Orientation: Alert. Oriented to person, place, time and situation.    Behavior and Interaction: Patient is visible in the milieu, watching TV with peers, with minimal engagement with peers.     Patient is pleasant and cooperative with nursing assessment.    Mental Health Symptoms: Reports mild anxiety and \"some\" depression. Patient denies auditory hallucinations during this assessment.    Medical Concerns: No new concerns.    Other Concerns: None.    Medication Compliant: Patient is compliant with all scheduled medication.    PRN: Nicotine gum.    Medication Side Effects: Denies medication side effects.    Fluid & Food Intake: Adequate fluid and food intake.     Bowel & Bladder/Elimination: Denies problems, last bowel movement was today.    Self Care:Independent, fairly groomed.    Vital Signs: Denies pain.  /88 (BP Location: Right arm, Patient Position: Sitting)   Pulse 87   Temp 98.3  F (36.8  C) (Oral)   Resp 18   Ht 1.626 m (5' 4\")   Wt 76.2 kg (168 lb)   LMP  (LMP Unknown)   SpO2 98%   BMI 28.84 kg/m        Problem: Psychotic Signs/Symptoms  Goal: Improved Behavioral Control (Psychotic Signs/Symptoms)  Outcome: Progressing  Intervention: Manage Behavior  Recent Flowsheet Documentation  Taken 5/19/2025 1947 by Gladis Werner RN  De-Escalation Techniques:   appropriate behavior reinforced   diversional activity encouraged   medication administered   stimulation decreased   verbally redirected   Goal Outcome Evaluation:    Plan of Care Reviewed With: patient                       "

## 2025-05-20 NOTE — PLAN OF CARE
BEH IP Unit Acuity Rating Score (UARS)  Patient is given one point for every criteria they meet.    CRITERIA SCORING   On a 72 hour hold, court hold, committed, stay of commitment, or revocation. 0    Patient LOS on BEH unit exceeds 20 days. 0  LOS: 8   Patient under guardianship, 55+, otherwise medically complex, or under age 11. 0   Suicide ideation without relief of precipitating factors. 1   Current plan for suicide. 0   Current plan for homicide. 0   Imminent risk or actual attempt to seriously harm another without relief of factors precipitating the attempt. 0   Severe dysfunction in daily living (ex: complete neglect for self care, extreme disruption in vegetative function, extreme deterioration in social interactions). 1   Recent (last 7 days) or current physical aggression in the ED or on unit. 0   Restraints or seclusion episode in past 72 hours. 0   Recent (last 7 days) or current verbal aggression, agitation, yelling, etc., while in the ED or unit. 0   Active psychosis. 0   Need for constant or near constant redirection (from leaving, from others, etc).  0   Intrusive or disruptive behaviors. 0   Patient requires 3 or more hours of individualized nursing care per 8-hour shift (i.e. for ADLs, meds, therapeutic interventions). 0   TOTAL 2

## 2025-05-20 NOTE — PLAN OF CARE
"  Rehab Group    Start time: 10:20  End time: 11:00  Patient time total: 8 minutes    attended partial group    #6 attended   Group Type: OT Clinic   Group Topic Covered: balanced lifestyle, coping skills, healthy leisure time, relaxation , and social skills     Group Session Detail:  Pt actively participated in occupational therapy clinic to facilitate coping skill exploration, creative expression within personally meaningful activities, and clinical observation of social, cognitive, and kinesthetic performance skills.        Patient Response/Contribution:  socially appropriate, passively engaged      Patient Detail:  Pt response: Patient arrived to this group somewhat late; however, independently elected to look through a scratch art design book and expressed interest in completing a scratch art project. Upon approach, patient expressed feeling \"pretty good\" today. Patient independently initiated looking through the scratch art book and looked through designs for some time. However, patient then elected to passively observe group and socialize with select peers briefly. Patient elected to leave group after this. Patient independently initiated and completed clean up of materials prior to leaving group. Affect appeared somewhat blunted; however, patient remained calm and cooperative during this group.       No Charge due to passive engagement.       Patient Active Problem List   Diagnosis    CARDIOVASCULAR SCREENING; LDL GOAL LESS THAN 160    Cholecystitis    Suicidal ideation    Auditory hallucinations    Depression with anxiety    Schizoaffective disorder, depressive type (H)    ADHD (attention deficit hyperactivity disorder), inattentive type    Polysubstance use disorder    MDD (major depressive disorder)    Other migraine without status migrainosus, not intractable    Depression, unspecified depression type    Abnormal EKG    Anxiety    Dental caries    Bulimia nervosa (H)    Elevated blood pressure reading    " Family history of hypertension    History of methamphetamine use    Hyperlipidemia    Hypertension    Microcytic anemia    Nicotine dependence    Opioid use disorder in remission    Body dysmorphic disorder    Depression, major, recurrent    Schizoaffective disorder (H)    Encounter for monitoring opioid maintenance therapy    Schizophrenia (H)    Methamphetamine use (H)    Stimulant use disorder    Opioid use disorder    Methamphetamine use disorder, moderate (H)    Stimulant-induced psychotic disorder (H)    Alcohol dependence (H)    Suicidal thoughts    Alcohol use disorder    Borderline personality disorder (H)    Malingering    Unsheltered homelessness    Homelessness    Psychosis (H)    Acute psychosis (H)

## 2025-05-20 NOTE — PLAN OF CARE
Team Note Due:  Wednesday     Assessment/Intervention/Current Symtoms and Care Coordination:  Chart review and met with team, discussed pt progress, symptomology, and response to treatment.  Discussed the discharge plan and any potential impediments to discharge.    In team it was discussed that Cam reported mild anxiety and anxiety.  They was out in the lounge yesterday and socialized with select peers.    They had an interview with [facility] today and have been accepted to their [facility].  They were very happy with the news.  They will discharge tomorrow  @ 1130.  They will be transported by a medical ride.  Medications have been sent to our pharmacy.    Discharge Plan or Goal:  [facility]  Discharge: 5/21/2025  Time: 1130  Ride: Medical Ride  Medications: Sent to our pharmacy     Barriers to Discharge:  Severity of symptoms  Housing security  Limited social support     Referral Status:  [facility] 5/16/2025  [facility] 5/16/2025  [facility] 5/16/2025     Legal Status:  Voluntary      Contacts (include DEEDEE status):  [facility]  [place]    Zita Castro ([facility]): [phone] [facility] Program:   Caity Lau   Phone Number: [phone]  Email: [email or web address].     Upcoming Meetings and Dates/Important Information and next steps:  [employer] [facility] interview tomorrow 5/20/2025 @ 1100.

## 2025-05-21 PROCEDURE — 250N000013 HC RX MED GY IP 250 OP 250 PS 637: Performed by: PHYSICIAN ASSISTANT

## 2025-05-21 PROCEDURE — 99238 HOSP IP/OBS DSCHRG MGMT 30/<: CPT | Performed by: NURSE PRACTITIONER

## 2025-05-21 PROCEDURE — 250N000013 HC RX MED GY IP 250 OP 250 PS 637: Performed by: PSYCHIATRY & NEUROLOGY

## 2025-05-21 PROCEDURE — 250N000013 HC RX MED GY IP 250 OP 250 PS 637: Performed by: NURSE PRACTITIONER

## 2025-05-21 RX ADMIN — HALOPERIDOL 5 MG: 5 TABLET ORAL at 08:06

## 2025-05-21 RX ADMIN — VENLAFAXINE HYDROCHLORIDE 75 MG: 75 CAPSULE, EXTENDED RELEASE ORAL at 08:06

## 2025-05-21 RX ADMIN — LOSARTAN POTASSIUM 100 MG: 25 TABLET, FILM COATED ORAL at 08:07

## 2025-05-21 RX ADMIN — NICOTINE POLACRILEX 4 MG: 2 LOZENGE ORAL at 10:58

## 2025-05-21 RX ADMIN — NICOTINE 1 PATCH: 14 PATCH, EXTENDED RELEASE TRANSDERMAL at 08:07

## 2025-05-21 RX ADMIN — NICOTINE POLACRILEX 4 MG: 2 LOZENGE ORAL at 09:32

## 2025-05-21 ASSESSMENT — ACTIVITIES OF DAILY LIVING (ADL)
ADLS_ACUITY_SCORE: 40
HYGIENE/GROOMING: INDEPENDENT
DRESS: INDEPENDENT
ADLS_ACUITY_SCORE: 40
ORAL_HYGIENE: INDEPENDENT
LAUNDRY: WITH SUPERVISION
ADLS_ACUITY_SCORE: 40

## 2025-05-21 NOTE — PLAN OF CARE
Problem: Psychotic Signs/Symptoms  Goal: Improved Sleep (Psychotic Signs/Symptoms)  Outcome: Progressing     Problem: Sleep Disturbance  Goal: Adequate Sleep/Rest  Outcome: Progressing    Pt slept through the night without distress. Pt reported no pain or discomfort. No problems with behavior. No concerns reported by pt. Pt slept 7 hours.  Staff will continue to monitor.

## 2025-05-21 NOTE — PLAN OF CARE
Pt. States ready for discharge and is requesting discharge.  Pt. Reports no suicidal ideation, self-injurious behavior.  Pt. Reports that their thoughts are clear, reality based. Pt. States that they understands their therapeutic discharge plan and medication regime and has no concerns or questions.  Pt. States that they will follow their medication regime and therapeutic discharge plan.

## 2025-05-21 NOTE — PLAN OF CARE
Care Coordinator scheduled the following Med Ride:     Insurance/ Name: HARRISmichael ([phone])/Jamaal  Trans[employer]/ Name: [employer]   Ride Confirmation # [id number]  [employer] Phone: [phone]  P/u Date/Time: Wednesday, May 21, 2025 @ 11:30am   P/u Address: [place] [id number]  Destination: [address] [place] [address]   **Cab instructed to call Nursing Station upon arrival: [phone]     CC will confirm that ride is indeed scheduled under unit number via <<"[email or web address]>>.     CC updated CTC
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
## 2025-05-21 NOTE — PLAN OF CARE
Team Note Due:  Wednesday     Assessment/Intervention/Current Symtoms and Care Coordination:  Chart review and met with team, discussed pt progress, symptomology, and response to treatment.  Discussed the discharge plan and any potential impediments to discharge.    Cam discharged to [facility] IR today at 1130.  Sent referral for case management to [facility].  Discharge went smoothly.  Sent AVS to [facility].      Discharge Plan or Goal:  [facility]  Discharge: 5/21/2025  Time: 1130  Ride: Medical Ride  Medications: Sent to our pharmacy     Barriers to Discharge:  Severity of symptoms  Housing security  Limited social support     Referral Status:  [employer] [facility] 5/16/2025  [facility] 5/16/2025  [facility] 5/16/2025     Legal Status:  Voluntary      Contacts (include DEEDEE status):  [facility]  Mary Castro ([facility]): [phone] [facility]  [facility] Program:   Caity Lau   Phone Number: [phone]  Email: [email or web address]     Upcoming Meetings and Dates/Important Information and next steps:  [employer] interview tomorrow 5/20/2025 @ [facility].
